# Patient Record
Sex: FEMALE | Race: WHITE | NOT HISPANIC OR LATINO | Employment: OTHER | RURAL
[De-identification: names, ages, dates, MRNs, and addresses within clinical notes are randomized per-mention and may not be internally consistent; named-entity substitution may affect disease eponyms.]

---

## 2017-04-07 ENCOUNTER — TELEPHONE (OUTPATIENT)
Dept: FAMILY MEDICINE CLINIC | Facility: CLINIC | Age: 72
End: 2017-04-07

## 2017-04-07 RX ORDER — ROSUVASTATIN CALCIUM 10 MG/1
10 TABLET, COATED ORAL DAILY
Qty: 90 TABLET | Refills: 1 | Status: SHIPPED | OUTPATIENT
Start: 2017-04-07 | End: 2017-07-24 | Stop reason: SDUPTHER

## 2017-07-24 ENCOUNTER — TELEPHONE (OUTPATIENT)
Dept: FAMILY MEDICINE CLINIC | Facility: CLINIC | Age: 72
End: 2017-07-24

## 2017-07-24 DIAGNOSIS — E78.5 HYPERLIPIDEMIA, UNSPECIFIED HYPERLIPIDEMIA TYPE: Primary | ICD-10-CM

## 2017-07-24 RX ORDER — ROSUVASTATIN CALCIUM 10 MG/1
10 TABLET, COATED ORAL DAILY
Qty: 90 TABLET | Refills: 1 | Status: SHIPPED | OUTPATIENT
Start: 2017-07-24 | End: 2018-02-15 | Stop reason: SDUPTHER

## 2017-07-24 NOTE — TELEPHONE ENCOUNTER
She wants to know if she can come in to get her Lipids checked. Says she is trying a new product and wants to  see if it is working? Also needs her Crestor refilled.

## 2017-07-25 ENCOUNTER — TELEPHONE (OUTPATIENT)
Dept: FAMILY MEDICINE CLINIC | Facility: CLINIC | Age: 72
End: 2017-07-25

## 2017-07-25 ENCOUNTER — LAB (OUTPATIENT)
Dept: FAMILY MEDICINE CLINIC | Facility: CLINIC | Age: 72
End: 2017-07-25

## 2017-07-25 DIAGNOSIS — E78.5 HYPERLIPIDEMIA, UNSPECIFIED HYPERLIPIDEMIA TYPE: ICD-10-CM

## 2017-07-25 LAB
ALBUMIN SERPL-MCNC: 4.1 G/DL (ref 3.5–5)
ALBUMIN/GLOB SERPL: 1.6 G/DL (ref 1.1–2.5)
ALP SERPL-CCNC: 72 U/L (ref 24–120)
ALT SERPL-CCNC: 46 U/L (ref 0–54)
AST SERPL-CCNC: 32 U/L (ref 7–45)
BASOPHILS # BLD AUTO: 0.01 10*3/MM3 (ref 0–0.2)
BASOPHILS NFR BLD AUTO: 0.2 % (ref 0–2)
BILIRUB SERPL-MCNC: 0.7 MG/DL (ref 0.1–1)
BUN SERPL-MCNC: 14 MG/DL (ref 5–21)
BUN/CREAT SERPL: 21.2 (ref 7–25)
CALCIUM SERPL-MCNC: 9 MG/DL (ref 8.4–10.4)
CHLORIDE SERPL-SCNC: 102 MMOL/L (ref 98–110)
CHOLEST SERPL-MCNC: 253 MG/DL (ref 130–200)
CO2 SERPL-SCNC: 29 MMOL/L (ref 24–31)
CREAT SERPL-MCNC: 0.66 MG/DL (ref 0.5–1.4)
EOSINOPHIL # BLD AUTO: 0.06 10*3/MM3 (ref 0–0.7)
EOSINOPHIL NFR BLD AUTO: 1.1 % (ref 0–4)
ERYTHROCYTE [DISTWIDTH] IN BLOOD BY AUTOMATED COUNT: 13.1 % (ref 12–15)
GLOBULIN SER CALC-MCNC: 2.5 GM/DL
GLUCOSE SERPL-MCNC: 93 MG/DL (ref 70–100)
HCT VFR BLD AUTO: 42.9 % (ref 37–47)
HDLC SERPL-MCNC: 78 MG/DL
HGB BLD-MCNC: 14 G/DL (ref 12–16)
IMM GRANULOCYTES # BLD: 0.02 10*3/MM3 (ref 0–0.03)
IMM GRANULOCYTES NFR BLD: 0.4 % (ref 0–5)
LDLC SERPL CALC-MCNC: 155 MG/DL (ref 0–99)
LYMPHOCYTES # BLD AUTO: 1.49 10*3/MM3 (ref 0.72–4.86)
LYMPHOCYTES NFR BLD AUTO: 26.1 % (ref 15–45)
MCH RBC QN AUTO: 31.7 PG (ref 28–32)
MCHC RBC AUTO-ENTMCNC: 32.6 G/DL (ref 33–36)
MCV RBC AUTO: 97.3 FL (ref 82–98)
MONOCYTES # BLD AUTO: 0.34 10*3/MM3 (ref 0.19–1.3)
MONOCYTES NFR BLD AUTO: 6 % (ref 4–12)
NEUTROPHILS # BLD AUTO: 3.78 10*3/MM3 (ref 1.87–8.4)
NEUTROPHILS NFR BLD AUTO: 66.2 % (ref 39–78)
PLATELET # BLD AUTO: 225 10*3/MM3 (ref 130–400)
POTASSIUM SERPL-SCNC: 4.3 MMOL/L (ref 3.5–5.3)
PROT SERPL-MCNC: 6.6 G/DL (ref 6.3–8.7)
RBC # BLD AUTO: 4.41 10*6/MM3 (ref 4.2–5.4)
SODIUM SERPL-SCNC: 140 MMOL/L (ref 135–145)
TRIGL SERPL-MCNC: 100 MG/DL (ref 0–149)
VLDLC SERPL CALC-MCNC: 20 MG/DL
WBC # BLD AUTO: 5.7 10*3/MM3 (ref 4.8–10.8)

## 2017-07-25 NOTE — TELEPHONE ENCOUNTER
----- Message from Kyleigh Wan MA sent at 7/25/2017  2:47 PM CDT -----  Pt said that she has not been on crestor for 3 months she states that a person is not supposed to be on a statin drug for long period time it will damage your heart she will talk to her  and see what he thinks about it.      Actually the studies in the [place] show a clear benefit from staying on a statin drug--that's the only thing shown to be helpful for prevention--I am happy to refer her to a lipid specialist if she would like more education on the subject

## 2017-07-27 ENCOUNTER — OFFICE VISIT (OUTPATIENT)
Dept: FAMILY MEDICINE CLINIC | Facility: CLINIC | Age: 72
End: 2017-07-27

## 2017-07-27 DIAGNOSIS — R00.2 PALPITATIONS: Primary | ICD-10-CM

## 2017-07-27 DIAGNOSIS — E78.2 MIXED HYPERLIPIDEMIA: ICD-10-CM

## 2017-07-27 DIAGNOSIS — IMO0001 COLD: ICD-10-CM

## 2017-07-27 LAB
T4 FREE SERPL-MCNC: 1.03 NG/DL (ref 0.78–2.19)
TSH SERPL DL<=0.005 MIU/L-ACNC: 2.76 MIU/ML (ref 0.47–4.68)

## 2017-07-27 PROCEDURE — 99213 OFFICE O/P EST LOW 20 MIN: CPT | Performed by: FAMILY MEDICINE

## 2017-07-27 NOTE — PROGRESS NOTES
Subjective   Lavinia Aguilar is a 72 y.o. female.     No chief complaint on file.      History of Present Illness     she notes issues with palpitations aloing wiht heat and cold initoleances --she is worried about her thyroid function      Current Outpatient Prescriptions:   •  estrogens, conjugated, (PREMARIN) 0.625 MG tablet, Take 0.625 mg by mouth Daily. Take daily for 21 days then do not take for 7 days., Disp: , Rfl:   •  estrogens, conjugated, (PREMARIN) 0.625 MG tablet, Take 1 tablet by mouth Daily. Take daily, Disp: 90 tablet, Rfl: 1  •  hydrochlorothiazide (HYDRODIURIL) 25 MG tablet, Take 25 mg by mouth Daily., Disp: , Rfl:   •  rosuvastatin (CRESTOR) 10 MG tablet, Take 1 tablet by mouth Daily., Disp: 90 tablet, Rfl: 1  No Known Allergies    Past Medical History:   Diagnosis Date   • Hyperlipidemia      Past Surgical History:   Procedure Laterality Date   • COLONOSCOPY     • HYSTERECTOMY     • INSERTION / REMOVAL CRANIAL DBS GENERATOR     • SHOULDER SURGERY Right    • TONSILLECTOMY     • TUBAL ABDOMINAL LIGATION         Review of Systems   Constitutional: Negative.    HENT: Negative.    Eyes: Negative.    Respiratory: Negative.    Cardiovascular: Negative.    Gastrointestinal: Negative.    Endocrine: Positive for cold intolerance.   Genitourinary: Negative.    Musculoskeletal: Negative.    Skin: Negative.    Allergic/Immunologic: Negative.    Neurological: Negative.    Hematological: Negative.    Psychiatric/Behavioral: Negative.        Objective  There were no vitals taken for this visit.  Physical Exam   Constitutional: She is oriented to person, place, and time. She appears well-developed and well-nourished.   HENT:   Head: Normocephalic and atraumatic.   Right Ear: External ear normal.   Left Ear: External ear normal.   Nose: Nose normal.   Mouth/Throat: Oropharynx is clear and moist.   Eyes: Conjunctivae and EOM are normal. Pupils are equal, round, and reactive to light.   Neck: Normal range of motion.  Neck supple.   Cardiovascular: Normal rate, regular rhythm, normal heart sounds and intact distal pulses.    Pulmonary/Chest: Effort normal and breath sounds normal.   Abdominal: Soft. Bowel sounds are normal.   Musculoskeletal: Normal range of motion.   Neurological: She is alert and oriented to person, place, and time. She has normal reflexes.   Skin: Skin is warm and dry.   Psychiatric: She has a normal mood and affect. Her behavior is normal. Judgment and thought content normal.   Nursing note and vitals reviewed.      Assessment/Plan   Diagnoses and all orders for this visit:    Palpitations  -     TSH  -     T4, free  -     Holter monitor - 24 hour  -     Adult Transthoracic Echo Complete    Cold    Mixed hyperlipidemia                 Orders Placed This Encounter   Procedures   • TSH   • T4, free   • Holter monitor - 24 hour     Order Specific Question:   Reason for exam?     Answer:   Palpitations   • Adult Transthoracic Echo Complete     Order Specific Question:   Reason for exam?     Answer:   Palpitations       Follow up: 4 week(s)

## 2017-08-08 ENCOUNTER — HOSPITAL ENCOUNTER (OUTPATIENT)
Dept: CARDIOLOGY | Facility: HOSPITAL | Age: 72
Discharge: HOME | End: 2017-08-08
Attending: FAMILY MEDICINE

## 2017-08-08 ENCOUNTER — HOSPITAL ENCOUNTER (OUTPATIENT)
Dept: CARDIOLOGY | Facility: HOSPITAL | Age: 72
Discharge: HOME | End: 2017-08-08
Attending: FAMILY MEDICINE | Admitting: FAMILY MEDICINE

## 2017-08-08 VITALS
DIASTOLIC BLOOD PRESSURE: 72 MMHG | SYSTOLIC BLOOD PRESSURE: 117 MMHG | BODY MASS INDEX: 21.26 KG/M2 | WEIGHT: 120 LBS | HEIGHT: 63 IN

## 2017-08-08 LAB
BH CV ECHO MEAS - AO MAX PG (FULL): 3.1 MMHG
BH CV ECHO MEAS - AO MAX PG: 6.3 MMHG
BH CV ECHO MEAS - AO MEAN PG (FULL): 1 MMHG
BH CV ECHO MEAS - AO MEAN PG: 3 MMHG
BH CV ECHO MEAS - AO ROOT AREA (BSA CORRECTED): 1.9
BH CV ECHO MEAS - AO ROOT AREA: 7.1 CM^2
BH CV ECHO MEAS - AO ROOT DIAM: 3 CM
BH CV ECHO MEAS - AO V2 MAX: 125 CM/SEC
BH CV ECHO MEAS - AO V2 MEAN: 83 CM/SEC
BH CV ECHO MEAS - AO V2 VTI: 28.8 CM
BH CV ECHO MEAS - AVA(I,A): 2.1 CM^2
BH CV ECHO MEAS - AVA(I,D): 2.1 CM^2
BH CV ECHO MEAS - AVA(V,A): 2 CM^2
BH CV ECHO MEAS - AVA(V,D): 2 CM^2
BH CV ECHO MEAS - BSA(HAYCOCK): 1.6 M^2
BH CV ECHO MEAS - BSA: 1.6 M^2
BH CV ECHO MEAS - BZI_BMI: 21.3 KILOGRAMS/M^2
BH CV ECHO MEAS - BZI_METRIC_HEIGHT: 160 CM
BH CV ECHO MEAS - BZI_METRIC_WEIGHT: 54.4 KG
BH CV ECHO MEAS - CONTRAST EF 4CH: 65.2 ML/M^2
BH CV ECHO MEAS - EDV(CUBED): 65.9 ML
BH CV ECHO MEAS - EDV(MOD-SP4): 66 ML
BH CV ECHO MEAS - EDV(TEICH): 71.7 ML
BH CV ECHO MEAS - EF(CUBED): 68.1 %
BH CV ECHO MEAS - EF(MOD-SP4): 65.2 %
BH CV ECHO MEAS - EF(TEICH): 60.2 %
BH CV ECHO MEAS - ESV(CUBED): 21 ML
BH CV ECHO MEAS - ESV(MOD-SP4): 23 ML
BH CV ECHO MEAS - ESV(TEICH): 28.5 ML
BH CV ECHO MEAS - FS: 31.7 %
BH CV ECHO MEAS - IVS/LVPW: 1.1
BH CV ECHO MEAS - IVSD: 1 CM
BH CV ECHO MEAS - LA DIMENSION: 3.4 CM
BH CV ECHO MEAS - LA/AO: 1.1
BH CV ECHO MEAS - LAT PEAK E' VEL: 7.6 CM/SEC
BH CV ECHO MEAS - LV DIASTOLIC VOL/BSA (35-75): 42.4 ML/M^2
BH CV ECHO MEAS - LV MASS(C)D: 121.7 GRAMS
BH CV ECHO MEAS - LV MASS(C)DI: 78.2 GRAMS/M^2
BH CV ECHO MEAS - LV MAX PG: 3.2 MMHG
BH CV ECHO MEAS - LV MEAN PG: 2 MMHG
BH CV ECHO MEAS - LV SYSTOLIC VOL/BSA (12-30): 14.8 ML/M^2
BH CV ECHO MEAS - LV V1 MAX: 89.4 CM/SEC
BH CV ECHO MEAS - LV V1 MEAN: 59 CM/SEC
BH CV ECHO MEAS - LV V1 VTI: 21.3 CM
BH CV ECHO MEAS - LVIDD: 4 CM
BH CV ECHO MEAS - LVIDS: 2.8 CM
BH CV ECHO MEAS - LVLD AP4: 7.4 CM
BH CV ECHO MEAS - LVLS AP4: 5.7 CM
BH CV ECHO MEAS - LVOT AREA (M): 2.8 CM^2
BH CV ECHO MEAS - LVOT AREA: 2.8 CM^2
BH CV ECHO MEAS - LVOT DIAM: 1.9 CM
BH CV ECHO MEAS - LVPWD: 0.92 CM
BH CV ECHO MEAS - MED PEAK E' VEL: 7.72 CM/SEC
BH CV ECHO MEAS - MV A MAX VEL: 80.5 CM/SEC
BH CV ECHO MEAS - MV DEC TIME: 0.17 SEC
BH CV ECHO MEAS - MV E MAX VEL: 72.8 CM/SEC
BH CV ECHO MEAS - MV E/A: 0.9
BH CV ECHO MEAS - RAP SYSTOLE: 10 MMHG
BH CV ECHO MEAS - RVSP: 26.2 MMHG
BH CV ECHO MEAS - SI(AO): 130.8 ML/M^2
BH CV ECHO MEAS - SI(CUBED): 28.9 ML/M^2
BH CV ECHO MEAS - SI(LVOT): 38.8 ML/M^2
BH CV ECHO MEAS - SI(MOD-SP4): 27.6 ML/M^2
BH CV ECHO MEAS - SI(TEICH): 27.7 ML/M^2
BH CV ECHO MEAS - SV(AO): 203.6 ML
BH CV ECHO MEAS - SV(CUBED): 44.9 ML
BH CV ECHO MEAS - SV(LVOT): 60.4 ML
BH CV ECHO MEAS - SV(MOD-SP4): 43 ML
BH CV ECHO MEAS - SV(TEICH): 43.2 ML
BH CV ECHO MEAS - TR MAX VEL: 201 CM/SEC
LEFT ATRIUM VOLUME INDEX: 19.4 ML/M2
LEFT ATRIUM VOLUME: 30.2 CM3
LV EF 2D ECHO EST: 65 %

## 2017-08-08 PROCEDURE — 93226 XTRNL ECG REC<48 HR SCAN A/R: CPT

## 2017-08-08 PROCEDURE — 93225 XTRNL ECG REC<48 HRS REC: CPT

## 2017-08-08 PROCEDURE — 93306 TTE W/DOPPLER COMPLETE: CPT | Performed by: INTERNAL MEDICINE

## 2017-08-08 PROCEDURE — 93306 TTE W/DOPPLER COMPLETE: CPT

## 2017-08-20 PROCEDURE — 93227 XTRNL ECG REC<48 HR R&I: CPT | Performed by: INTERNAL MEDICINE

## 2017-08-25 ENCOUNTER — OFFICE VISIT (OUTPATIENT)
Dept: FAMILY MEDICINE CLINIC | Facility: CLINIC | Age: 72
End: 2017-08-25

## 2017-08-25 VITALS
WEIGHT: 116 LBS | RESPIRATION RATE: 16 BRPM | HEIGHT: 63 IN | HEART RATE: 97 BPM | DIASTOLIC BLOOD PRESSURE: 80 MMHG | SYSTOLIC BLOOD PRESSURE: 118 MMHG | OXYGEN SATURATION: 98 % | BODY MASS INDEX: 20.55 KG/M2

## 2017-08-25 DIAGNOSIS — R00.2 PALPITATIONS: ICD-10-CM

## 2017-08-25 DIAGNOSIS — E78.2 MIXED HYPERLIPIDEMIA: Primary | ICD-10-CM

## 2017-08-25 PROCEDURE — 99213 OFFICE O/P EST LOW 20 MIN: CPT | Performed by: FAMILY MEDICINE

## 2017-08-25 RX ORDER — CHOLECALCIFEROL (VITAMIN D3) 125 MCG
100 CAPSULE ORAL
COMMUNITY
Start: 2014-04-15 | End: 2020-12-03

## 2017-08-25 RX ORDER — ROSUVASTATIN CALCIUM 10 MG/1
10 TABLET, COATED ORAL
COMMUNITY
Start: 2014-04-15 | End: 2017-08-25

## 2017-08-25 NOTE — PROGRESS NOTES
"Subjective   Lavinia Aguilar is a 72 y.o. female.     Chief Complaint   Patient presents with   • Follow-up     4 wk        History of Present Illness     lavinia is here for recheck--her palpitiatons are improved..she is tolerating crestor witout myaglais      Current Outpatient Prescriptions:   •  Coenzyme Q-10 100 MG capsule, Take 100 mg by mouth., Disp: , Rfl:   •  Multiple Vitamins-Minerals (MULTIVITAMIN ADULT PO), Take  by mouth., Disp: , Rfl:   •  estrogens, conjugated, (PREMARIN) 0.625 MG tablet, Take 1 tablet by mouth Daily. Take daily, Disp: 90 tablet, Rfl: 1  •  rosuvastatin (CRESTOR) 10 MG tablet, Take 1 tablet by mouth Daily., Disp: 90 tablet, Rfl: 1  No Known Allergies    Past Medical History:   Diagnosis Date   • Hyperlipidemia      Past Surgical History:   Procedure Laterality Date   • COLONOSCOPY     • HYSTERECTOMY     • INSERTION / REMOVAL CRANIAL DBS GENERATOR     • SHOULDER SURGERY Right    • TONSILLECTOMY     • TUBAL ABDOMINAL LIGATION         Review of Systems   Constitutional: Negative.    HENT: Negative.    Eyes: Negative.    Respiratory: Negative.    Cardiovascular: Positive for palpitations (but improved).   Gastrointestinal: Negative.    Endocrine: Negative.    Genitourinary: Negative.    Musculoskeletal: Negative.    Skin: Negative.    Allergic/Immunologic: Negative.    Neurological: Negative.    Hematological: Negative.    Psychiatric/Behavioral: Negative.        Objective  /80  Pulse 97  Resp 16  Ht 63\" (160 cm)  Wt 116 lb (52.6 kg)  SpO2 98%  BMI 20.55 kg/m2  Physical Exam   Constitutional: She is oriented to person, place, and time. She appears well-developed and well-nourished.   HENT:   Head: Normocephalic and atraumatic.   Right Ear: External ear normal.   Left Ear: External ear normal.   Nose: Nose normal.   Mouth/Throat: Oropharynx is clear and moist.   Eyes: Conjunctivae and EOM are normal. Pupils are equal, round, and reactive to light.   Neck: Normal range of motion. " Neck supple.   Cardiovascular: Normal rate, regular rhythm, normal heart sounds and intact distal pulses.    Pulmonary/Chest: Effort normal and breath sounds normal.   Abdominal: Soft. Bowel sounds are normal.   Musculoskeletal: Normal range of motion.   Neurological: She is alert and oriented to person, place, and time. She has normal reflexes.   Skin: Skin is warm and dry.   Psychiatric: She has a normal mood and affect. Her behavior is normal. Judgment and thought content normal.   Nursing note and vitals reviewed.      Assessment/Plan   Lavinia was seen today for follow-up.    Diagnoses and all orders for this visit:    Mixed hyperlipidemia    Palpitations    she revealed she has been under a lot of stress at her [employer]--she is considering leaving her [employer]             No orders of the defined types were placed in this encounter.      Follow up: 6 month(s)

## 2017-12-07 DIAGNOSIS — E78.5 HYPERLIPIDEMIA, UNSPECIFIED HYPERLIPIDEMIA TYPE: Primary | ICD-10-CM

## 2017-12-08 ENCOUNTER — RESULTS ENCOUNTER (OUTPATIENT)
Dept: FAMILY MEDICINE CLINIC | Facility: CLINIC | Age: 72
End: 2017-12-08

## 2017-12-08 DIAGNOSIS — E78.5 HYPERLIPIDEMIA, UNSPECIFIED HYPERLIPIDEMIA TYPE: ICD-10-CM

## 2017-12-08 LAB
ALBUMIN SERPL-MCNC: 4.1 G/DL (ref 3.5–5)
ALBUMIN/GLOB SERPL: 1.5 G/DL (ref 1.1–2.5)
ALP SERPL-CCNC: 62 U/L (ref 24–120)
ALT SERPL-CCNC: 39 U/L (ref 0–54)
AST SERPL-CCNC: 30 U/L (ref 7–45)
BILIRUB SERPL-MCNC: 0.4 MG/DL (ref 0.1–1)
BUN SERPL-MCNC: 14 MG/DL (ref 5–21)
BUN/CREAT SERPL: 19.2 (ref 7–25)
CALCIUM SERPL-MCNC: 9.3 MG/DL (ref 8.4–10.4)
CHLORIDE SERPL-SCNC: 100 MMOL/L (ref 98–110)
CHOLEST SERPL-MCNC: 172 MG/DL (ref 130–200)
CO2 SERPL-SCNC: 32 MMOL/L (ref 24–31)
CREAT SERPL-MCNC: 0.73 MG/DL (ref 0.5–1.4)
GFR SERPLBLD CREATININE-BSD FMLA CKD-EPI: 78 ML/MIN/1.73
GFR SERPLBLD CREATININE-BSD FMLA CKD-EPI: 95 ML/MIN/1.73
GLOBULIN SER CALC-MCNC: 2.8 GM/DL
GLUCOSE SERPL-MCNC: 92 MG/DL (ref 70–100)
HDLC SERPL-MCNC: 91 MG/DL
LDLC SERPL CALC-MCNC: 67 MG/DL (ref 0–99)
POTASSIUM SERPL-SCNC: 4.1 MMOL/L (ref 3.5–5.3)
PROT SERPL-MCNC: 6.9 G/DL (ref 6.3–8.7)
SODIUM SERPL-SCNC: 140 MMOL/L (ref 135–145)
TRIGL SERPL-MCNC: 69 MG/DL (ref 0–149)
VLDLC SERPL CALC-MCNC: 13.8 MG/DL

## 2018-01-25 ENCOUNTER — HOSPITAL ENCOUNTER (OUTPATIENT)
Dept: GENERAL RADIOLOGY | Age: 73
Discharge: HOME | End: 2018-01-25
Payer: MEDICARE

## 2018-01-25 ENCOUNTER — HOSPITAL ENCOUNTER (OUTPATIENT)
Dept: CT IMAGING | Age: 73
Discharge: HOME | End: 2018-01-25
Payer: MEDICARE

## 2018-01-25 DIAGNOSIS — R52 PAIN: ICD-10-CM

## 2018-01-25 DIAGNOSIS — M75.122 COMPLETE ROTATOR CUFF RUPTURE OF LEFT SHOULDER: ICD-10-CM

## 2018-01-25 PROCEDURE — 73040 CONTRAST X-RAY OF SHOULDER: CPT

## 2018-01-25 PROCEDURE — 23350 INJECTION FOR SHOULDER X-RAY: CPT

## 2018-01-25 PROCEDURE — 6360000004 HC RX CONTRAST MEDICATION: Performed by: ORTHOPAEDIC SURGERY

## 2018-01-25 PROCEDURE — 73200 CT UPPER EXTREMITY W/O DYE: CPT

## 2018-01-25 RX ADMIN — IOPAMIDOL 15 ML: 612 INJECTION, SOLUTION INTRATHECAL at 11:00

## 2018-02-15 ENCOUNTER — HOSPITAL ENCOUNTER (OUTPATIENT)
Dept: PREADMISSION TESTING | Age: 73
Discharge: HOME | DRG: 483 | End: 2018-02-19
Payer: MEDICARE

## 2018-02-15 ENCOUNTER — HOSPITAL ENCOUNTER (OUTPATIENT)
Dept: GENERAL RADIOLOGY | Age: 73
Discharge: HOME | DRG: 483 | End: 2018-02-15
Payer: MEDICARE

## 2018-02-15 VITALS — HEIGHT: 63 IN | BODY MASS INDEX: 20.55 KG/M2 | WEIGHT: 116 LBS

## 2018-02-15 LAB
ALBUMIN SERPL-MCNC: 3.8 G/DL (ref 3.5–5.2)
ALP BLD-CCNC: 60 U/L (ref 35–104)
ALT SERPL-CCNC: 14 U/L (ref 5–33)
ANION GAP SERPL CALCULATED.3IONS-SCNC: 10 MMOL/L (ref 7–19)
AST SERPL-CCNC: 22 U/L (ref 5–32)
BASOPHILS ABSOLUTE: 0 K/UL (ref 0–0.2)
BASOPHILS RELATIVE PERCENT: 0.3 % (ref 0–1)
BILIRUB SERPL-MCNC: <0.2 MG/DL (ref 0.2–1.2)
BUN BLDV-MCNC: 15 MG/DL (ref 8–23)
CALCIUM SERPL-MCNC: 9.4 MG/DL (ref 8.8–10.2)
CHLORIDE BLD-SCNC: 103 MMOL/L (ref 98–111)
CO2: 30 MMOL/L (ref 22–29)
CREAT SERPL-MCNC: 0.7 MG/DL (ref 0.5–0.9)
EOSINOPHILS ABSOLUTE: 0.2 K/UL (ref 0–0.6)
EOSINOPHILS RELATIVE PERCENT: 2.2 % (ref 0–5)
GFR NON-AFRICAN AMERICAN: >60
GLUCOSE BLD-MCNC: 80 MG/DL (ref 74–109)
HCT VFR BLD CALC: 43.4 % (ref 37–47)
HEMOGLOBIN: 14.3 G/DL (ref 12–16)
INR BLD: 0.95 (ref 0.88–1.18)
LYMPHOCYTES ABSOLUTE: 2.1 K/UL (ref 1.1–4.5)
LYMPHOCYTES RELATIVE PERCENT: 28.7 % (ref 20–40)
MCH RBC QN AUTO: 32.1 PG (ref 27–31)
MCHC RBC AUTO-ENTMCNC: 32.9 G/DL (ref 33–37)
MCV RBC AUTO: 97.5 FL (ref 81–99)
MONOCYTES ABSOLUTE: 0.5 K/UL (ref 0–0.9)
MONOCYTES RELATIVE PERCENT: 6.2 % (ref 0–10)
NEUTROPHILS ABSOLUTE: 4.6 K/UL (ref 1.5–7.5)
NEUTROPHILS RELATIVE PERCENT: 62.3 % (ref 50–65)
PDW BLD-RTO: 12.5 % (ref 11.5–14.5)
PLATELET # BLD: 221 K/UL (ref 130–400)
PMV BLD AUTO: 10.3 FL (ref 9.4–12.3)
POTASSIUM SERPL-SCNC: 4.3 MMOL/L (ref 3.5–5)
PROTHROMBIN TIME: 12.6 SEC (ref 12–14.6)
RBC # BLD: 4.45 M/UL (ref 4.2–5.4)
SODIUM BLD-SCNC: 143 MMOL/L (ref 136–145)
TOTAL PROTEIN: 6.7 G/DL (ref 6.6–8.7)
WBC # BLD: 7.4 K/UL (ref 4.8–10.8)

## 2018-02-15 PROCEDURE — 71046 X-RAY EXAM CHEST 2 VIEWS: CPT

## 2018-02-15 PROCEDURE — 80053 COMPREHEN METABOLIC PANEL: CPT

## 2018-02-15 PROCEDURE — 85610 PROTHROMBIN TIME: CPT

## 2018-02-15 PROCEDURE — 85025 COMPLETE CBC W/AUTO DIFF WBC: CPT

## 2018-02-15 PROCEDURE — 87070 CULTURE OTHR SPECIMN AEROBIC: CPT

## 2018-02-15 PROCEDURE — 93005 ELECTROCARDIOGRAM TRACING: CPT

## 2018-02-15 RX ORDER — ASCORBIC ACID 1000 MG
TABLET ORAL DAILY
COMMUNITY

## 2018-02-15 RX ORDER — ROSUVASTATIN CALCIUM 10 MG/1
10 TABLET, COATED ORAL DAILY
Qty: 90 TABLET | Refills: 1 | Status: SHIPPED | OUTPATIENT
Start: 2018-02-15 | End: 2018-06-05 | Stop reason: SDUPTHER

## 2018-02-15 RX ORDER — ROSUVASTATIN CALCIUM 10 MG/1
10 TABLET, COATED ORAL DAILY
COMMUNITY

## 2018-02-16 LAB — MRSA CULTURE ONLY: NORMAL

## 2018-02-21 PROBLEM — M75.102 LEFT ROTATOR CUFF TEAR ARTHROPATHY: Status: ACTIVE | Noted: 2018-02-21

## 2018-02-21 PROBLEM — M12.812 LEFT ROTATOR CUFF TEAR ARTHROPATHY: Status: ACTIVE | Noted: 2018-02-21

## 2018-02-21 NOTE — OP NOTE
Patient Name: Trevor Nguyễn  MRN: [id number]  : 1945      DATE of SURGERY: 18    SURGEON: Rica Payan MD    ASSISTANT: NONE    PREOPERATIVE DIAGNOSIS: Left Shoulder Rotator Cuff Tear Arthropathy    POSTOPERATIVE DIAGNOSIS: Left Shoulder Rotator Cuff Tear Arthropathy    PROCEDURE PERFORMED: Left Reverse Total Shoulder Arthroplasty    IMPLANTS: [employer]                Baseplate:small                Glenosphere: 36 + 4                Poly: + 3                Cup: 36 + 2 right                             Stem: 7 pressfit    ANESTHESIA USED: General endotrachial anesthesia, interscalene block    OPERATIVE INDICATIONS: 67 y.o. YO female with progressive loss of function and increasing pain of the upper extremity due to a massive irreparable tear of the rotator cuff. Due to loss of function and progressive pain, a reverse shoulder arthroplasty is planned to improve function and decrease pain. Surgical evaluation was discussed and the patient wished to proceed understanding risks, benefits, and alternatives. The surgical indications were to relieve pain, improve function, and prevent future disability in regards to the shoulder pathology dictated in the aboved diagnoses. Risks included, but were not limited to, that of anesthesia, bleeding, infection, pain, damage to local structures, postoperative dislocation, need for further surgery, failure of repair, stiffness, failure of implants, and loss of function. The patient has failed a combination of the following improve pain and function: physical therapy >12 wks, corticosteroid injections, NSAIDs, activity modification. ESTIMATED BLOOD LOSS: 150 mL    DRAINS: none     COMPLICATIONS: none    SPECIMENS: none    FINDINGS: see op note    PROCEDURE in DETAIL:  The patient was seen in the preoperative holding room, once again the informed consent was reviewed with the patient and signed.   The site of surgery was marked with the patient's complication. Attention was turned back to the humeral side where progressively sized broaches were inserted until a stable fit was achieved. A trial cup and polyethylenes were placed until range of motion and stability were adequate. The conjoint tendon showed increased tension. With traction of the shoulder, the entire scapula was translating without dissociation of the polyethylene. Trial implants were removed, final implants impacted, and the shoulder was once again reduced showing excellent stability and range of motion. The incision was thoroughly irrigated, followed by closure in layers. The skin was closed with adhesive glue. A sterile dressing and sling were placed. Counts were correct. The patient was awakened by anesthesia, transported to the recovery room in stable condition.     POSTOPERATIVE PLAN:  1) Admit inpatient for pain control, neurovascular monitoring  2) Discharge home once pain is controlled  3) Reverse total shoulder protocol     Electronically signed by Herrera Subramanian MD on 2/22/2018 at 2:18 PM

## 2018-02-22 ENCOUNTER — APPOINTMENT (OUTPATIENT)
Dept: GENERAL RADIOLOGY | Age: 73
DRG: 483 | End: 2018-02-22
Attending: ORTHOPAEDIC SURGERY
Payer: MEDICARE

## 2018-02-22 ENCOUNTER — ANESTHESIA EVENT (OUTPATIENT)
Dept: OPERATING ROOM | Age: 73
DRG: 483 | End: 2018-02-22
Payer: MEDICARE

## 2018-02-22 ENCOUNTER — ANESTHESIA (OUTPATIENT)
Dept: OPERATING ROOM | Age: 73
DRG: 483 | End: 2018-02-22
Payer: MEDICARE

## 2018-02-22 ENCOUNTER — HOSPITAL ENCOUNTER (INPATIENT)
Age: 73
LOS: 1 days | Discharge: HOME | DRG: 483 | End: 2018-02-23
Attending: ORTHOPAEDIC SURGERY | Admitting: ORTHOPAEDIC SURGERY
Payer: MEDICARE

## 2018-02-22 VITALS
OXYGEN SATURATION: 100 % | SYSTOLIC BLOOD PRESSURE: 107 MMHG | RESPIRATION RATE: 21 BRPM | DIASTOLIC BLOOD PRESSURE: 54 MMHG | TEMPERATURE: 97 F

## 2018-02-22 LAB
ABO/RH: NORMAL
ANTIBODY SCREEN: NORMAL

## 2018-02-22 PROCEDURE — 2500000003 HC RX 250 WO HCPCS: Performed by: NURSE ANESTHETIST, CERTIFIED REGISTERED

## 2018-02-22 PROCEDURE — 6360000002 HC RX W HCPCS: Performed by: ORTHOPAEDIC SURGERY

## 2018-02-22 PROCEDURE — 86901 BLOOD TYPING SEROLOGIC RH(D): CPT

## 2018-02-22 PROCEDURE — 2720000010 HC SURG SUPPLY STERILE: Performed by: ORTHOPAEDIC SURGERY

## 2018-02-22 PROCEDURE — 73030 X-RAY EXAM OF SHOULDER: CPT

## 2018-02-22 PROCEDURE — 7100000001 HC PACU RECOVERY - ADDTL 15 MIN: Performed by: ORTHOPAEDIC SURGERY

## 2018-02-22 PROCEDURE — 6360000002 HC RX W HCPCS: Performed by: NURSE ANESTHETIST, CERTIFIED REGISTERED

## 2018-02-22 PROCEDURE — 86900 BLOOD TYPING SEROLOGIC ABO: CPT

## 2018-02-22 PROCEDURE — 6360000002 HC RX W HCPCS

## 2018-02-22 PROCEDURE — 1210000000 HC MED SURG R&B

## 2018-02-22 PROCEDURE — 2580000003 HC RX 258: Performed by: ORTHOPAEDIC SURGERY

## 2018-02-22 PROCEDURE — 2720000001 HC MISC SURG SUPPLY STERILE $51-500: Performed by: ORTHOPAEDIC SURGERY

## 2018-02-22 PROCEDURE — C1776 JOINT DEVICE (IMPLANTABLE): HCPCS | Performed by: ORTHOPAEDIC SURGERY

## 2018-02-22 PROCEDURE — C1713 ANCHOR/SCREW BN/BN,TIS/BN: HCPCS | Performed by: ORTHOPAEDIC SURGERY

## 2018-02-22 PROCEDURE — 3700000001 HC ADD 15 MINUTES (ANESTHESIA): Performed by: ORTHOPAEDIC SURGERY

## 2018-02-22 PROCEDURE — 2720000000 HC MISC SURG SUPPLY STERILE $0-50: Performed by: ORTHOPAEDIC SURGERY

## 2018-02-22 PROCEDURE — 2700000000 HC OXYGEN THERAPY PER DAY

## 2018-02-22 PROCEDURE — 3600000005 HC SURGERY LEVEL 5 BASE: Performed by: ORTHOPAEDIC SURGERY

## 2018-02-22 PROCEDURE — 6370000000 HC RX 637 (ALT 250 FOR IP): Performed by: ORTHOPAEDIC SURGERY

## 2018-02-22 PROCEDURE — 36415 COLL VENOUS BLD VENIPUNCTURE: CPT

## 2018-02-22 PROCEDURE — 2710000010 HC SURG SUPPLY NONSTERILE: Performed by: ORTHOPAEDIC SURGERY

## 2018-02-22 PROCEDURE — 94664 DEMO&/EVAL PT USE INHALER: CPT

## 2018-02-22 PROCEDURE — 3700000000 HC ANESTHESIA ATTENDED CARE: Performed by: ORTHOPAEDIC SURGERY

## 2018-02-22 PROCEDURE — 3600000015 HC SURGERY LEVEL 5 ADDTL 15MIN: Performed by: ORTHOPAEDIC SURGERY

## 2018-02-22 PROCEDURE — 0RRK00Z REPLACEMENT OF LEFT SHOULDER JOINT WITH REVERSE BALL AND SOCKET SYNTHETIC SUBSTITUTE, OPEN APPROACH: ICD-10-PCS | Performed by: ORTHOPAEDIC SURGERY

## 2018-02-22 PROCEDURE — 64415 NJX AA&/STRD BRCH PLXS IMG: CPT | Performed by: NURSE ANESTHETIST, CERTIFIED REGISTERED

## 2018-02-22 PROCEDURE — 7100000000 HC PACU RECOVERY - FIRST 15 MIN: Performed by: ORTHOPAEDIC SURGERY

## 2018-02-22 PROCEDURE — 86850 RBC ANTIBODY SCREEN: CPT

## 2018-02-22 DEVICE — STEM HUM SZ 7 SHLDR TI UNIVERS REVERS: Type: IMPLANTABLE DEVICE | Site: SHOULDER | Status: FUNCTIONAL

## 2018-02-22 DEVICE — SCREW BNE L36MM DIA4.5MM UNIV SHLDR TI PERIPH LOK UNIVERSE: Type: IMPLANTABLE DEVICE | Site: SHOULDER | Status: FUNCTIONAL

## 2018-02-22 DEVICE — CUP HUM DIA36MM +2MM OFFSET R SHLDR SUT UNIVERS REVERS: Type: IMPLANTABLE DEVICE | Site: SHOULDER | Status: FUNCTIONAL

## 2018-02-22 DEVICE — SPHERE GLEN SM DIA36MM +4MM OFFSET LAT SHLDR UNIVERS REVERS: Type: IMPLANTABLE DEVICE | Site: SHOULDER | Status: FUNCTIONAL

## 2018-02-22 DEVICE — SCREW BNE L30MM DIA4.5MM UNIV SHLDR TI PERIPH LOK UNIVERSE: Type: IMPLANTABLE DEVICE | Site: SHOULDER | Status: FUNCTIONAL

## 2018-02-22 DEVICE — SCREW BNE L20MM DIA6.5MM UNIV SHLDR CTRL UNIVERSE REVERS: Type: IMPLANTABLE DEVICE | Site: SHOULDER | Status: FUNCTIONAL

## 2018-02-22 DEVICE — LINER HUM SM DIA36MM +3MM OFFSET SHLDR UHMWPE UNIVERS: Type: IMPLANTABLE DEVICE | Site: SHOULDER | Status: FUNCTIONAL

## 2018-02-22 DEVICE — BASEPLATE GLEN SM UNIV SHLDR CAP COAT UNIVERSE REVERS: Type: IMPLANTABLE DEVICE | Site: SHOULDER | Status: FUNCTIONAL

## 2018-02-22 RX ORDER — DEXAMETHASONE SODIUM PHOSPHATE 10 MG/ML
INJECTION INTRAMUSCULAR; INTRAVENOUS PRN
Status: DISCONTINUED | OUTPATIENT
Start: 2018-02-22 | End: 2018-02-22 | Stop reason: SDUPTHER

## 2018-02-22 RX ORDER — LABETALOL HYDROCHLORIDE 5 MG/ML
5 INJECTION, SOLUTION INTRAVENOUS EVERY 10 MIN PRN
Status: DISCONTINUED | OUTPATIENT
Start: 2018-02-22 | End: 2018-02-22 | Stop reason: HOSPADM

## 2018-02-22 RX ORDER — SCOLOPAMINE TRANSDERMAL SYSTEM 1 MG/1
1 PATCH, EXTENDED RELEASE TRANSDERMAL ONCE
Status: DISCONTINUED | OUTPATIENT
Start: 2018-02-22 | End: 2018-02-22 | Stop reason: HOSPADM

## 2018-02-22 RX ORDER — ACETAMINOPHEN 325 MG/1
650 TABLET ORAL EVERY 4 HOURS PRN
Status: DISCONTINUED | OUTPATIENT
Start: 2018-02-22 | End: 2018-02-23 | Stop reason: HOSPADM

## 2018-02-22 RX ORDER — PROMETHAZINE HYDROCHLORIDE 25 MG/ML
6.25 INJECTION, SOLUTION INTRAMUSCULAR; INTRAVENOUS
Status: DISCONTINUED | OUTPATIENT
Start: 2018-02-22 | End: 2018-02-22 | Stop reason: HOSPADM

## 2018-02-22 RX ORDER — FAMOTIDINE 20 MG/1
20 TABLET, FILM COATED ORAL 2 TIMES DAILY
Status: DISCONTINUED | OUTPATIENT
Start: 2018-02-22 | End: 2018-02-23 | Stop reason: HOSPADM

## 2018-02-22 RX ORDER — METOCLOPRAMIDE HYDROCHLORIDE 5 MG/ML
10 INJECTION INTRAMUSCULAR; INTRAVENOUS
Status: DISCONTINUED | OUTPATIENT
Start: 2018-02-22 | End: 2018-02-22 | Stop reason: HOSPADM

## 2018-02-22 RX ORDER — MIDAZOLAM HYDROCHLORIDE 1 MG/ML
2 INJECTION INTRAMUSCULAR; INTRAVENOUS
Status: DISCONTINUED | OUTPATIENT
Start: 2018-02-22 | End: 2018-02-22 | Stop reason: HOSPADM

## 2018-02-22 RX ORDER — ACETAMINOPHEN 650 MG/1
650 SUPPOSITORY RECTAL EVERY 4 HOURS PRN
Status: DISCONTINUED | OUTPATIENT
Start: 2018-02-22 | End: 2018-02-23 | Stop reason: HOSPADM

## 2018-02-22 RX ORDER — SODIUM CHLORIDE 0.9 % (FLUSH) 0.9 %
10 SYRINGE (ML) INJECTION EVERY 12 HOURS SCHEDULED
Status: DISCONTINUED | OUTPATIENT
Start: 2018-02-22 | End: 2018-02-22 | Stop reason: HOSPADM

## 2018-02-22 RX ORDER — ENALAPRILAT 2.5 MG/2ML
1.25 INJECTION INTRAVENOUS
Status: DISCONTINUED | OUTPATIENT
Start: 2018-02-22 | End: 2018-02-22 | Stop reason: HOSPADM

## 2018-02-22 RX ORDER — FENTANYL CITRATE 50 UG/ML
50 INJECTION, SOLUTION INTRAMUSCULAR; INTRAVENOUS
Status: DISCONTINUED | OUTPATIENT
Start: 2018-02-22 | End: 2018-02-22 | Stop reason: HOSPADM

## 2018-02-22 RX ORDER — MORPHINE SULFATE 4 MG/ML
2 INJECTION, SOLUTION INTRAMUSCULAR; INTRAVENOUS EVERY 5 MIN PRN
Status: DISCONTINUED | OUTPATIENT
Start: 2018-02-22 | End: 2018-02-22 | Stop reason: HOSPADM

## 2018-02-22 RX ORDER — SODIUM CHLORIDE, SODIUM LACTATE, POTASSIUM CHLORIDE, CALCIUM CHLORIDE 600; 310; 30; 20 MG/100ML; MG/100ML; MG/100ML; MG/100ML
INJECTION, SOLUTION INTRAVENOUS CONTINUOUS
Status: DISCONTINUED | OUTPATIENT
Start: 2018-02-22 | End: 2018-02-22

## 2018-02-22 RX ORDER — DIPHENHYDRAMINE HYDROCHLORIDE 50 MG/ML
12.5 INJECTION INTRAMUSCULAR; INTRAVENOUS
Status: DISCONTINUED | OUTPATIENT
Start: 2018-02-22 | End: 2018-02-22 | Stop reason: HOSPADM

## 2018-02-22 RX ORDER — MORPHINE SULFATE 4 MG/ML
2 INJECTION, SOLUTION INTRAMUSCULAR; INTRAVENOUS
Status: DISCONTINUED | OUTPATIENT
Start: 2018-02-22 | End: 2018-02-23 | Stop reason: HOSPADM

## 2018-02-22 RX ORDER — SODIUM CHLORIDE 0.9 % (FLUSH) 0.9 %
10 SYRINGE (ML) INJECTION PRN
Status: DISCONTINUED | OUTPATIENT
Start: 2018-02-22 | End: 2018-02-22 | Stop reason: HOSPADM

## 2018-02-22 RX ORDER — MULTIVITAMIN WITH FOLIC ACID 400 MCG
1 TABLET ORAL DAILY
Status: DISCONTINUED | OUTPATIENT
Start: 2018-02-22 | End: 2018-02-23 | Stop reason: HOSPADM

## 2018-02-22 RX ORDER — FENTANYL CITRATE 50 UG/ML
INJECTION, SOLUTION INTRAMUSCULAR; INTRAVENOUS PRN
Status: DISCONTINUED | OUTPATIENT
Start: 2018-02-22 | End: 2018-02-22 | Stop reason: SDUPTHER

## 2018-02-22 RX ORDER — PROPOFOL 10 MG/ML
INJECTION, EMULSION INTRAVENOUS PRN
Status: DISCONTINUED | OUTPATIENT
Start: 2018-02-22 | End: 2018-02-22 | Stop reason: SDUPTHER

## 2018-02-22 RX ORDER — EPHEDRINE SULFATE 50 MG/ML
INJECTION, SOLUTION INTRAVENOUS PRN
Status: DISCONTINUED | OUTPATIENT
Start: 2018-02-22 | End: 2018-02-22 | Stop reason: SDUPTHER

## 2018-02-22 RX ORDER — MEPERIDINE HYDROCHLORIDE 50 MG/ML
12.5 INJECTION INTRAMUSCULAR; INTRAVENOUS; SUBCUTANEOUS EVERY 5 MIN PRN
Status: DISCONTINUED | OUTPATIENT
Start: 2018-02-22 | End: 2018-02-22 | Stop reason: HOSPADM

## 2018-02-22 RX ORDER — SODIUM CHLORIDE 0.9 % (FLUSH) 0.9 %
10 SYRINGE (ML) INJECTION EVERY 12 HOURS SCHEDULED
Status: DISCONTINUED | OUTPATIENT
Start: 2018-02-22 | End: 2018-02-23 | Stop reason: HOSPADM

## 2018-02-22 RX ORDER — HYDRALAZINE HYDROCHLORIDE 20 MG/ML
5 INJECTION INTRAMUSCULAR; INTRAVENOUS EVERY 10 MIN PRN
Status: DISCONTINUED | OUTPATIENT
Start: 2018-02-22 | End: 2018-02-22 | Stop reason: HOSPADM

## 2018-02-22 RX ORDER — DIAZEPAM 5 MG/1
5 TABLET ORAL EVERY 6 HOURS PRN
Status: DISCONTINUED | OUTPATIENT
Start: 2018-02-22 | End: 2018-02-23 | Stop reason: HOSPADM

## 2018-02-22 RX ORDER — MORPHINE SULFATE 4 MG/ML
4 INJECTION, SOLUTION INTRAMUSCULAR; INTRAVENOUS EVERY 5 MIN PRN
Status: DISCONTINUED | OUTPATIENT
Start: 2018-02-22 | End: 2018-02-22 | Stop reason: HOSPADM

## 2018-02-22 RX ORDER — LIDOCAINE HYDROCHLORIDE 10 MG/ML
1 INJECTION, SOLUTION EPIDURAL; INFILTRATION; INTRACAUDAL; PERINEURAL ONCE
Status: DISCONTINUED | OUTPATIENT
Start: 2018-02-22 | End: 2018-02-22 | Stop reason: HOSPADM

## 2018-02-22 RX ORDER — ONDANSETRON 2 MG/ML
INJECTION INTRAMUSCULAR; INTRAVENOUS PRN
Status: DISCONTINUED | OUTPATIENT
Start: 2018-02-22 | End: 2018-02-22 | Stop reason: SDUPTHER

## 2018-02-22 RX ORDER — OXYCODONE HYDROCHLORIDE 5 MG/1
5 TABLET ORAL EVERY 4 HOURS PRN
Status: DISCONTINUED | OUTPATIENT
Start: 2018-02-22 | End: 2018-02-23 | Stop reason: HOSPADM

## 2018-02-22 RX ORDER — OXYCODONE HYDROCHLORIDE 5 MG/1
10 TABLET ORAL EVERY 4 HOURS PRN
Status: DISCONTINUED | OUTPATIENT
Start: 2018-02-22 | End: 2018-02-23 | Stop reason: HOSPADM

## 2018-02-22 RX ORDER — SODIUM CHLORIDE 0.9 % (FLUSH) 0.9 %
10 SYRINGE (ML) INJECTION PRN
Status: DISCONTINUED | OUTPATIENT
Start: 2018-02-22 | End: 2018-02-23 | Stop reason: HOSPADM

## 2018-02-22 RX ORDER — LIDOCAINE HYDROCHLORIDE 10 MG/ML
INJECTION, SOLUTION INFILTRATION; PERINEURAL PRN
Status: DISCONTINUED | OUTPATIENT
Start: 2018-02-22 | End: 2018-02-22 | Stop reason: SDUPTHER

## 2018-02-22 RX ORDER — SODIUM CHLORIDE, SODIUM LACTATE, POTASSIUM CHLORIDE, CALCIUM CHLORIDE 600; 310; 30; 20 MG/100ML; MG/100ML; MG/100ML; MG/100ML
INJECTION, SOLUTION INTRAVENOUS CONTINUOUS
Status: DISCONTINUED | OUTPATIENT
Start: 2018-02-22 | End: 2018-02-23 | Stop reason: HOSPADM

## 2018-02-22 RX ORDER — DIPHENHYDRAMINE HCL 25 MG
25 TABLET ORAL EVERY 6 HOURS PRN
Status: DISCONTINUED | OUTPATIENT
Start: 2018-02-22 | End: 2018-02-23 | Stop reason: HOSPADM

## 2018-02-22 RX ORDER — CYCLOBENZAPRINE HCL 10 MG
10 TABLET ORAL 3 TIMES DAILY PRN
Status: DISCONTINUED | OUTPATIENT
Start: 2018-02-22 | End: 2018-02-23 | Stop reason: HOSPADM

## 2018-02-22 RX ORDER — DOCUSATE SODIUM 100 MG/1
100 CAPSULE, LIQUID FILLED ORAL 2 TIMES DAILY
Status: DISCONTINUED | OUTPATIENT
Start: 2018-02-22 | End: 2018-02-23 | Stop reason: HOSPADM

## 2018-02-22 RX ORDER — MORPHINE SULFATE 4 MG/ML
4 INJECTION, SOLUTION INTRAMUSCULAR; INTRAVENOUS
Status: DISCONTINUED | OUTPATIENT
Start: 2018-02-22 | End: 2018-02-23 | Stop reason: HOSPADM

## 2018-02-22 RX ORDER — ATORVASTATIN CALCIUM 40 MG/1
40 TABLET, FILM COATED ORAL NIGHTLY
Status: DISCONTINUED | OUTPATIENT
Start: 2018-02-22 | End: 2018-02-23 | Stop reason: HOSPADM

## 2018-02-22 RX ORDER — MORPHINE SULFATE 4 MG/ML
4 INJECTION, SOLUTION INTRAMUSCULAR; INTRAVENOUS
Status: DISCONTINUED | OUTPATIENT
Start: 2018-02-22 | End: 2018-02-22 | Stop reason: HOSPADM

## 2018-02-22 RX ORDER — FENTANYL CITRATE 50 UG/ML
50 INJECTION, SOLUTION INTRAMUSCULAR; INTRAVENOUS
Status: DISCONTINUED | OUTPATIENT
Start: 2018-02-22 | End: 2018-02-23 | Stop reason: HOSPADM

## 2018-02-22 RX ORDER — LIDOCAINE HYDROCHLORIDE 10 MG/ML
1 INJECTION, SOLUTION EPIDURAL; INFILTRATION; INTRACAUDAL; PERINEURAL
Status: DISCONTINUED | OUTPATIENT
Start: 2018-02-22 | End: 2018-02-22 | Stop reason: HOSPADM

## 2018-02-22 RX ORDER — ONDANSETRON 2 MG/ML
4 INJECTION INTRAMUSCULAR; INTRAVENOUS EVERY 6 HOURS PRN
Status: DISCONTINUED | OUTPATIENT
Start: 2018-02-22 | End: 2018-02-23 | Stop reason: HOSPADM

## 2018-02-22 RX ORDER — MIDAZOLAM HYDROCHLORIDE 1 MG/ML
INJECTION INTRAMUSCULAR; INTRAVENOUS
Status: COMPLETED
Start: 2018-02-22 | End: 2018-02-22

## 2018-02-22 RX ORDER — ROCURONIUM BROMIDE 10 MG/ML
INJECTION, SOLUTION INTRAVENOUS PRN
Status: DISCONTINUED | OUTPATIENT
Start: 2018-02-22 | End: 2018-02-22 | Stop reason: SDUPTHER

## 2018-02-22 RX ADMIN — OXYCODONE HYDROCHLORIDE 5 MG: 5 TABLET ORAL at 21:42

## 2018-02-22 RX ADMIN — SODIUM CHLORIDE, POTASSIUM CHLORIDE, SODIUM LACTATE AND CALCIUM CHLORIDE: 600; 310; 30; 20 INJECTION, SOLUTION INTRAVENOUS at 20:56

## 2018-02-22 RX ADMIN — PROPOFOL 160 MG: 10 INJECTION, EMULSION INTRAVENOUS at 12:48

## 2018-02-22 RX ADMIN — PHENYLEPHRINE HYDROCHLORIDE 160 MCG: 10 INJECTION INTRAVENOUS at 13:00

## 2018-02-22 RX ADMIN — PHENYLEPHRINE HYDROCHLORIDE 80 MCG: 10 INJECTION INTRAVENOUS at 13:19

## 2018-02-22 RX ADMIN — FAMOTIDINE 20 MG: 20 TABLET ORAL at 21:40

## 2018-02-22 RX ADMIN — PHENYLEPHRINE HYDROCHLORIDE 160 MCG: 10 INJECTION INTRAVENOUS at 13:34

## 2018-02-22 RX ADMIN — DEXAMETHASONE SODIUM PHOSPHATE 10 MG: 10 INJECTION INTRAMUSCULAR; INTRAVENOUS at 13:03

## 2018-02-22 RX ADMIN — PHENYLEPHRINE HYDROCHLORIDE 160 MCG: 10 INJECTION INTRAVENOUS at 13:25

## 2018-02-22 RX ADMIN — EPHEDRINE SULFATE 10 MG: 50 INJECTION, SOLUTION INTRAMUSCULAR; INTRAVENOUS; SUBCUTANEOUS at 13:43

## 2018-02-22 RX ADMIN — MIDAZOLAM 2 MG: 1 INJECTION INTRAMUSCULAR; INTRAVENOUS at 12:35

## 2018-02-22 RX ADMIN — SODIUM CHLORIDE, SODIUM LACTATE, POTASSIUM CHLORIDE, AND CALCIUM CHLORIDE: 600; 310; 30; 20 INJECTION, SOLUTION INTRAVENOUS at 12:42

## 2018-02-22 RX ADMIN — PHENYLEPHRINE HYDROCHLORIDE 160 MCG: 10 INJECTION INTRAVENOUS at 13:09

## 2018-02-22 RX ADMIN — CEFAZOLIN SODIUM 2 G: 2 SOLUTION INTRAVENOUS at 21:40

## 2018-02-22 RX ADMIN — FENTANYL CITRATE 50 MCG: 50 INJECTION, SOLUTION INTRAMUSCULAR; INTRAVENOUS at 12:48

## 2018-02-22 RX ADMIN — SUGAMMADEX 100 MG: 100 INJECTION, SOLUTION INTRAVENOUS at 14:11

## 2018-02-22 RX ADMIN — ATORVASTATIN CALCIUM 40 MG: 40 TABLET, FILM COATED ORAL at 21:40

## 2018-02-22 RX ADMIN — ROCURONIUM BROMIDE 35 MG: 10 INJECTION INTRAVENOUS at 12:48

## 2018-02-22 RX ADMIN — DIAZEPAM 5 MG: 5 TABLET ORAL at 21:40

## 2018-02-22 RX ADMIN — ONDANSETRON HYDROCHLORIDE 4 MG: 2 SOLUTION INTRAMUSCULAR; INTRAVENOUS at 13:58

## 2018-02-22 RX ADMIN — Medication 10 ML: at 23:24

## 2018-02-22 RX ADMIN — EPHEDRINE SULFATE 10 MG: 50 INJECTION, SOLUTION INTRAMUSCULAR; INTRAVENOUS; SUBCUTANEOUS at 13:55

## 2018-02-22 RX ADMIN — SODIUM CHLORIDE, SODIUM LACTATE, POTASSIUM CHLORIDE, AND CALCIUM CHLORIDE: 600; 310; 30; 20 INJECTION, SOLUTION INTRAVENOUS at 13:27

## 2018-02-22 RX ADMIN — LIDOCAINE HYDROCHLORIDE 5 ML: 10 INJECTION, SOLUTION INFILTRATION; PERINEURAL at 12:48

## 2018-02-22 RX ADMIN — ROCURONIUM BROMIDE 15 MG: 10 INJECTION INTRAVENOUS at 13:49

## 2018-02-22 RX ADMIN — CEFAZOLIN 1 G: 1 INJECTION, POWDER, FOR SOLUTION INTRAMUSCULAR; INTRAVENOUS; PARENTERAL at 13:02

## 2018-02-22 RX ADMIN — DOCUSATE SODIUM 100 MG: 100 CAPSULE, LIQUID FILLED ORAL at 21:40

## 2018-02-22 ASSESSMENT — PAIN SCALES - GENERAL
PAINLEVEL_OUTOF10: 0
PAINLEVEL_OUTOF10: 6

## 2018-02-22 ASSESSMENT — PAIN DESCRIPTION - ORIENTATION: ORIENTATION: LEFT

## 2018-02-22 ASSESSMENT — LIFESTYLE VARIABLES: SMOKING_STATUS: 0

## 2018-02-22 ASSESSMENT — PAIN - FUNCTIONAL ASSESSMENT: PAIN_FUNCTIONAL_ASSESSMENT: 0-10

## 2018-02-22 ASSESSMENT — PAIN DESCRIPTION - LOCATION: LOCATION: SHOULDER

## 2018-02-22 ASSESSMENT — ENCOUNTER SYMPTOMS: SHORTNESS OF BREATH: 0

## 2018-02-22 ASSESSMENT — PAIN DESCRIPTION - PAIN TYPE: TYPE: SURGICAL PAIN

## 2018-02-22 NOTE — PLAN OF CARE
Problem: Pain:  Goal: Pain level will decrease  Pain level will decrease  Outcome: Ongoing    Goal: Control of acute pain  Control of acute pain  Outcome: Ongoing    Goal: Control of chronic pain  Control of chronic pain  Outcome: Ongoing      Problem: Falls - Risk of:  Goal: Will remain free from falls  Will remain free from falls  Outcome: Ongoing    Goal: Absence of physical injury  Absence of physical injury  Outcome: Ongoing

## 2018-02-22 NOTE — BRIEF OP NOTE
Brief Postoperative Note  ______________________________________________________________    Patient: Kenton Nath  YOB: 1945  MRN: [id number]  Date of Procedure: 2/22/2018    Pre-Op Diagnosis: M12.812    Post-Op Diagnosis: Same       Procedure(s):  SHOULDER TOTAL ARTHROPLASTY REVERSE    Anesthesia: Regional, General    Surgeon(s):  Garcia Dick MD    Staff:  Scrub Person First: Lenin Valles  Scrub Person Second: Katharine Joyce     Estimated Blood Loss: * No values recorded between 2/22/2018 12:42 PM and 2/22/2018  1:80 PM * mL    Complications: None    Specimens:   * No specimens in log *    Implants:    Implant Name Type Inv.  Item Serial No.  Lot No. LRB No. Used   IMPL GLENOID BASEPLATE SM Shoulder/Arm/Wrist/Hand IMPL GLENOID BASEPLATE SM  [employer] [id number] Left 1   IMPL GLENOSPHERE REVERSE 36 +4 LAT Shoulder/Arm/Wrist/Hand IMPL GLENOSPHERE REVERSE 36 +4 LAT  [employer] [id number] Left 1   SCREW CENTRAL 20MM Screw/Plate/Nail/Albert SCREW CENTRAL 20MM  [employer] [id number] Left 1   SCREW GLENOID PERIPH LK 36MM Screw/Plate/Nail/Albert SCREW GLENOID PERIPH LK 36MM  [employer] [id number] Left 1   SCREW PERIPH LK GLENOID 30MM Screw/Plate/Nail/Albert SCREW PERIPH LK GLENOID 30MM  [employer] [id number] Left 1   IMPL STEM REVERSE SZ 7 Shoulder/Arm/Wrist/Hand IMPL STEM REVERSE SZ 7  [employer] [id number] Left 1   IMPL SHOULDER REVERSE CUP SZ 36 +2 RT Shoulder/Arm/Wrist/Hand IMPL SHOULDER REVERSE CUP SZ 36 +2 RT  [employer] [id number] Left 1   IMPL HUMERAL INSRT SM 36+3 Shoulder/Arm/Wrist/Hand IMPL HUMERAL INSRT SM 36+3   [employer] [id number] Left 1         Drains:      Findings: see op note    Garcia Dick MD  Date: 2/22/2018  Time: 2:17 PM

## 2018-02-22 NOTE — ANESTHESIA PRE PROCEDURE
Department of Anesthesiology  Preprocedure Note       Name:  Kathi Lea   Age:  67 y.o.  :  1945                                          MRN:  [id number]         Date:  2018      Surgeon: Mansoor Soler):  Amy Isaacs MD    Procedure: Procedure(s):  SHOULDER TOTAL ARTHROPLASTY REVERSE    Medications prior to admission:   Prior to Admission medications    Medication Sig Start Date End Date Taking?  Authorizing Provider   estrogens, conjugated, (PREMARIN) 0.625 MG tablet Take 0.625 mg by mouth daily    Historical Provider, MD   rosuvastatin (CRESTOR) 10 MG tablet Take 10 mg by mouth daily    Historical Provider, MD   Coenzyme Q10 (CO Q 10) 10 MG CAPS Take by mouth daily    Historical Provider, MD       Current medications:    Current Facility-Administered Medications   Medication Dose Route Frequency Provider Last Rate Last Dose    lactated ringers infusion   Intravenous Continuous Amy Isaacs MD        lidocaine PF 1 % injection 1 mL  1 mL Intradermal Once Amy Isaacs MD        midazolam (VERSED) 2 MG/2ML injection             ceFAZolin (ANCEF) 1 g in sterile water 10 mL IV syringe  1 g Intravenous Once Amy Isaacs MD        HYDROmorphone (DILAUDID) injection 0.25 mg  0.25 mg Intravenous Q5 Min PRN Whitney Quinn CRNA        HYDROmorphone (DILAUDID) injection 0.5 mg  0.5 mg Intravenous Q5 Min PRN Whitney Quinn CRNA        morphine injection 2 mg  2 mg Intravenous Q5 Min PRN Whitney Quinn CRNA        morphine injection 4 mg  4 mg Intravenous Q5 Min PRN Whitney Quinn CRNA        diphenhydrAMINE (BENADRYL) injection 12.5 mg  12.5 mg Intravenous Once PRN Whitney Quinn CRNA        promethazine [facility]) injection 6.25 mg  6.25 mg Intravenous Once PRN Whitney Quinn CRNA        metoclopramide (REGLAN) injection 10 mg  10 mg Intravenous Once PRN Whitney Quinn CRNA        labetalol (NORMODYNE;TRANDATE) injection 5 mg  5 mg Lab Results   Component Value Date    WBC 7.4 02/15/2018    RBC 4.45 02/15/2018    HGB 14.3 02/15/2018    HCT 43.4 02/15/2018    MCV 97.5 02/15/2018    RDW 12.5 02/15/2018     02/15/2018       CMP:   Lab Results   Component Value Date     02/15/2018    K 4.3 02/15/2018     02/15/2018    CO2 30 02/15/2018    BUN 15 02/15/2018    CREATININE 0.7 02/15/2018    LABGLOM >60 02/15/2018    GLUCOSE 80 02/15/2018    PROT 6.7 02/15/2018    CALCIUM 9.4 02/15/2018    BILITOT <0.2 02/15/2018    ALKPHOS 60 02/15/2018    AST 22 02/15/2018    ALT 14 02/15/2018       POC Tests: No results for input(s): POCGLU, POCNA, POCK, POCCL, POCBUN, POCHEMO, POCHCT in the last 72 hours. Coags:   Lab Results   Component Value Date    PROTIME 12.6 02/15/2018    INR 0.95 02/15/2018       HCG (If Applicable): No results found for: PREGTESTUR, PREGSERUM, HCG, HCGQUANT     ABGs: No results found for: PHART, PO2ART, RIJ2ORD, QUN1OUM, BEART, X6RRQPBC     Type & Screen (If Applicable):  No results found for: [facility]    Anesthesia Evaluation  Patient summary reviewed and Nursing notes reviewed no history of anesthetic complications:   Airway: Mallampati: II  TM distance: >3 FB   Neck ROM: full  Mouth opening: > = 3 FB Dental: normal exam   (+) caps      Pulmonary:Negative Pulmonary ROS and normal exam  breath sounds clear to auscultation      (-) shortness of breath and not a current smoker          Patient did not smoke on day of surgery.                  Cardiovascular:        (-) hypertension, CAD,  angina and  CHF    NYHA Classification: I  ECG reviewed  Rhythm: regular  Rate: normal           Beta Blocker:  Not on Beta Blocker         Neuro/Psych:   Negative Neuro/Psych ROS     (-) seizures, CVA and depression/anxiety             ROS comment: Has central  brain stimultor for tremor  GI/Hepatic/Renal: Neg GI/Hepatic/Renal ROS       (-) hiatal hernia and GERD       Endo/Other: Negative Endo/Other ROS             Pt had PAT

## 2018-02-22 NOTE — ANESTHESIA POSTPROCEDURE EVALUATION
Department of Anesthesiology  Postprocedure Note    Patient: Becky Mcclure  MRN: [id number]  [place]: 1945  Date of evaluation: 2/22/2018  Time:  2:26 PM     Procedure Summary     Date:  02/22/18 Room / Location:  [facility] OR 09 / [facility] OR    Anesthesia Start:  1242 Anesthesia Stop:      Procedure:  SHOULDER TOTAL ARTHROPLASTY REVERSE (Left Shoulder) Diagnosis:  (R28.821)    Surgeon:  Christophe Proctor MD Responsible Provider:  Sara Barajas CRNA    Anesthesia Type:  general, regional ASA Status:  2          Anesthesia Type: general, regional    Tamia Phase I: Tamia Score: 10    Tamia Phase II:      Last vitals: Reviewed and per EMR flowsheets. Anesthesia Post Evaluation    Patient location during evaluation: PACU  Patient participation: waiting for patient participation  Level of consciousness: awake and lethargic  Pain score: 0  Airway patency: patent  Nausea & Vomiting: no nausea and no vomiting  Complications: no  Cardiovascular status: blood pressure returned to baseline  Respiratory status: acceptable  Hydration status: euvolemic  Comments: Pt transported with oxygen.   Report to RN

## 2018-02-22 NOTE — H&P
Pt Name: Daquan Orozco  MRN: [id number]  [place]: 1945  Date of evaluation: 2/22/2018    H&P including current review of systems was updated in the paper chart and/or the document previously scanned into the record. There have been no significant changes or new problems since the original evaluation. The patient's problems continue and indications for contemplated procedure have not changed.     Electronically signed by Mau Jackson MD on 2/22/2018 at 10:29 AM

## 2018-02-23 VITALS
WEIGHT: 116 LBS | OXYGEN SATURATION: 95 % | DIASTOLIC BLOOD PRESSURE: 76 MMHG | SYSTOLIC BLOOD PRESSURE: 122 MMHG | BODY MASS INDEX: 20.55 KG/M2 | HEIGHT: 63 IN | TEMPERATURE: 97.8 F | HEART RATE: 91 BPM | RESPIRATION RATE: 17 BRPM

## 2018-02-23 LAB
ANION GAP SERPL CALCULATED.3IONS-SCNC: 9 MMOL/L (ref 7–19)
BUN BLDV-MCNC: 11 MG/DL (ref 8–23)
CALCIUM SERPL-MCNC: 8.3 MG/DL (ref 8.8–10.2)
CHLORIDE BLD-SCNC: 104 MMOL/L (ref 98–111)
CO2: 28 MMOL/L (ref 22–29)
CREAT SERPL-MCNC: 0.5 MG/DL (ref 0.5–0.9)
GFR NON-AFRICAN AMERICAN: >60
GLUCOSE BLD-MCNC: 120 MG/DL (ref 74–109)
HCT VFR BLD CALC: 34 % (ref 37–47)
HEMOGLOBIN: 11.3 G/DL (ref 12–16)
MCH RBC QN AUTO: 32.3 PG (ref 27–31)
MCHC RBC AUTO-ENTMCNC: 33.2 G/DL (ref 33–37)
MCV RBC AUTO: 97.1 FL (ref 81–99)
PDW BLD-RTO: 12.2 % (ref 11.5–14.5)
PLATELET # BLD: 169 K/UL (ref 130–400)
PMV BLD AUTO: 10.1 FL (ref 9.4–12.3)
POTASSIUM REFLEX MAGNESIUM: 4.3 MMOL/L (ref 3.5–5)
RBC # BLD: 3.5 M/UL (ref 4.2–5.4)
SODIUM BLD-SCNC: 141 MMOL/L (ref 136–145)
WBC # BLD: 9 K/UL (ref 4.8–10.8)

## 2018-02-23 PROCEDURE — 2580000003 HC RX 258: Performed by: ORTHOPAEDIC SURGERY

## 2018-02-23 PROCEDURE — G8987 SELF CARE CURRENT STATUS: HCPCS

## 2018-02-23 PROCEDURE — 97161 PT EVAL LOW COMPLEX 20 MIN: CPT

## 2018-02-23 PROCEDURE — 97165 OT EVAL LOW COMPLEX 30 MIN: CPT

## 2018-02-23 PROCEDURE — 6370000000 HC RX 637 (ALT 250 FOR IP): Performed by: ORTHOPAEDIC SURGERY

## 2018-02-23 PROCEDURE — 97530 THERAPEUTIC ACTIVITIES: CPT

## 2018-02-23 PROCEDURE — 85027 COMPLETE CBC AUTOMATED: CPT

## 2018-02-23 PROCEDURE — 80048 BASIC METABOLIC PNL TOTAL CA: CPT

## 2018-02-23 PROCEDURE — 97116 GAIT TRAINING THERAPY: CPT

## 2018-02-23 PROCEDURE — 36415 COLL VENOUS BLD VENIPUNCTURE: CPT

## 2018-02-23 PROCEDURE — G8979 MOBILITY GOAL STATUS: HCPCS

## 2018-02-23 PROCEDURE — G8978 MOBILITY CURRENT STATUS: HCPCS

## 2018-02-23 PROCEDURE — G8988 SELF CARE GOAL STATUS: HCPCS

## 2018-02-23 PROCEDURE — 6360000002 HC RX W HCPCS: Performed by: ORTHOPAEDIC SURGERY

## 2018-02-23 RX ORDER — CYCLOBENZAPRINE HCL 10 MG
10 TABLET ORAL 3 TIMES DAILY PRN
Qty: 15 TABLET | Refills: 0 | Status: SHIPPED | OUTPATIENT
Start: 2018-02-23 | End: 2018-03-05

## 2018-02-23 RX ORDER — OXYCODONE AND ACETAMINOPHEN 10; 325 MG/1; MG/1
1 TABLET ORAL EVERY 6 HOURS PRN
Qty: 50 TABLET | Refills: 0 | Status: SHIPPED | OUTPATIENT
Start: 2018-02-23 | End: 2018-03-02

## 2018-02-23 RX ORDER — DOCUSATE SODIUM 100 MG/1
100 CAPSULE, LIQUID FILLED ORAL 2 TIMES DAILY
Qty: 30 CAPSULE | Refills: 0 | Status: SHIPPED | OUTPATIENT
Start: 2018-02-23

## 2018-02-23 RX ORDER — OXYCODONE HYDROCHLORIDE 10 MG/1
10 TABLET ORAL EVERY 8 HOURS PRN
Qty: 50 TABLET | Refills: 0 | Status: SHIPPED | OUTPATIENT
Start: 2018-02-23 | End: 2018-03-02

## 2018-02-23 RX ADMIN — DOCUSATE SODIUM 100 MG: 100 CAPSULE, LIQUID FILLED ORAL at 08:59

## 2018-02-23 RX ADMIN — ACETAMINOPHEN 650 MG: 325 TABLET, FILM COATED ORAL at 02:44

## 2018-02-23 RX ADMIN — ENOXAPARIN SODIUM 40 MG: 40 INJECTION SUBCUTANEOUS at 08:59

## 2018-02-23 RX ADMIN — CYCLOBENZAPRINE HYDROCHLORIDE 10 MG: 10 TABLET, FILM COATED ORAL at 02:45

## 2018-02-23 RX ADMIN — CEFAZOLIN SODIUM 2 G: 2 SOLUTION INTRAVENOUS at 05:32

## 2018-02-23 RX ADMIN — THERA TABS 1 TABLET: TAB at 08:59

## 2018-02-23 RX ADMIN — Medication 10 ML: at 09:03

## 2018-02-23 RX ADMIN — OXYCODONE HYDROCHLORIDE 10 MG: 5 TABLET ORAL at 02:44

## 2018-02-23 RX ADMIN — OXYCODONE HYDROCHLORIDE 10 MG: 5 TABLET ORAL at 11:52

## 2018-02-23 ASSESSMENT — PAIN SCALES - GENERAL
PAINLEVEL_OUTOF10: 0
PAINLEVEL_OUTOF10: 10
PAINLEVEL_OUTOF10: 9

## 2018-02-23 ASSESSMENT — PAIN DESCRIPTION - DESCRIPTORS: DESCRIPTORS: ACHING

## 2018-02-23 ASSESSMENT — PAIN DESCRIPTION - ORIENTATION: ORIENTATION: LEFT

## 2018-02-23 ASSESSMENT — PAIN SCALES - WONG BAKER: WONGBAKER_NUMERICALRESPONSE: 2

## 2018-02-23 ASSESSMENT — PAIN DESCRIPTION - LOCATION: LOCATION: SHOULDER

## 2018-02-23 ASSESSMENT — PAIN DESCRIPTION - PAIN TYPE: TYPE: SURGICAL PAIN

## 2018-02-23 NOTE — PLAN OF CARE
Problem: Falls - Risk of  Goal: Absence of falls  Outcome: Ongoing      Problem: Pain:  Goal: Pain level will decrease  Pain level will decrease   Outcome: Ongoing      Problem: Falls - Risk of:  Goal: Will remain free from falls  Will remain free from falls   Outcome: Ongoing

## 2018-02-23 NOTE — DISCHARGE SUMMARY
NAME: Kieran Mock  : 1945  MRN: [id number]      Admission Date: 2018    Discharge Date:  2018    Final Diagnoses: Left rotator cuff tear arthropathy [M12.812]    Procedures: Left reverse TSA    Consultations:     Reason for Admission: 67 y.o. YO female with progressive loss of function and increasing pain of the upper extremity due to a massive irreparable tear of the rotator cuff. Due to loss of function and progressive pain, a reverse shoulder arthroplasty is planned to improve function and decrease pain. Surgical evaluation was discussed and the patient wished to proceed understanding risks, benefits, and alternatives. The surgical indications were to relieve pain, improve function, and prevent future disability in regards to the shoulder pathology dictated in the aboved diagnoses. Risks included, but were not limited to, that of anesthesia, bleeding, infection, pain, damage to local structures, postoperative dislocation, need for further surgery, failure of repair, stiffness, failure of implants, and loss of function. The patient has failed a combination of the following improve pain and function: physical therapy >12 wks, corticosteroid injections, NSAIDs, activity modification. Hospital Course: The patient was admitted with the above named diagnosis, surgery was performed and tolerated well. At the time of discharge, the patient was afebrile, vitals stable, pain was controlled with oral medication, they were tolerating a by mouth diet, and voiding appropriately. Physical therapy and occupational therapy were consulted. Given these findings they were deemed suitable to be discharged. Disposition: Home    Activity: Non-weight bearing left upper    Wound Instructions: see postop instructions    Diet: regular diet    Resume home meds:   Prior to Admission medications    Medication Sig Start Date End Date Taking?  Authorizing Provider   oxyCODONE-acetaminophen (PERCOCET)  MG per

## 2018-03-05 ENCOUNTER — OFFICE VISIT (OUTPATIENT)
Dept: FAMILY MEDICINE CLINIC | Facility: CLINIC | Age: 73
End: 2018-03-05

## 2018-03-05 VITALS
RESPIRATION RATE: 16 BRPM | BODY MASS INDEX: 20.91 KG/M2 | WEIGHT: 118 LBS | DIASTOLIC BLOOD PRESSURE: 80 MMHG | OXYGEN SATURATION: 97 % | SYSTOLIC BLOOD PRESSURE: 132 MMHG | HEART RATE: 82 BPM | HEIGHT: 63 IN

## 2018-03-05 DIAGNOSIS — S43.422D SPRAIN OF LEFT ROTATOR CUFF CAPSULE, SUBSEQUENT ENCOUNTER: ICD-10-CM

## 2018-03-05 DIAGNOSIS — E78.2 MIXED HYPERLIPIDEMIA: Primary | ICD-10-CM

## 2018-03-05 PROBLEM — S43.422A SPRAIN OF LEFT ROTATOR CUFF CAPSULE: Status: ACTIVE | Noted: 2018-03-05

## 2018-03-05 PROCEDURE — 99213 OFFICE O/P EST LOW 20 MIN: CPT | Performed by: FAMILY MEDICINE

## 2018-03-05 RX ORDER — OXYCODONE HYDROCHLORIDE 10 MG/1
10 TABLET ORAL EVERY 6 HOURS PRN
COMMUNITY
Start: 2018-02-23 | End: 2018-06-05

## 2018-03-05 RX ORDER — CYCLOBENZAPRINE HCL 10 MG
10 TABLET ORAL
COMMUNITY
Start: 2018-02-23 | End: 2018-03-05

## 2018-03-05 NOTE — PROGRESS NOTES
"Subjective   Lavinia Aguilar is a 72 y.o. female.     Chief Complaint   Patient presents with   • Hospital Follow Up     [facility] - Left Rotator Cuff Replacement        History of Present Illness     she is doing well after her rotator cuff surgery--starting p.t today--she is toleraing crestor witujhout myalgias      Current Outpatient Prescriptions:   •  Coenzyme Q-10 100 MG capsule, Take 100 mg by mouth., Disp: , Rfl:   •  cyclobenzaprine (FLEXERIL) 10 MG tablet, Take 10 mg by mouth., Disp: , Rfl:   •  estrogens, conjugated, (PREMARIN) 0.625 MG tablet, Take 1 tablet by mouth Daily. Take daily, Disp: 90 tablet, Rfl: 1  •  Multiple Vitamins-Minerals (MULTIVITAMIN ADULT PO), Take  by mouth., Disp: , Rfl:   •  oxyCODONE (ROXICODONE) 10 MG tablet, 10 mg Every 6 (Six) Hours As Needed (Patient takes at bedtime)., Disp: , Rfl:   •  rosuvastatin (CRESTOR) 10 MG tablet, Take 1 tablet by mouth Daily., Disp: 90 tablet, Rfl: 1  No Known Allergies    Past Medical History:   Diagnosis Date   • Hyperlipidemia    • Left rotator cuff tear      Past Surgical History:   Procedure Laterality Date   • COLONOSCOPY     • HYSTERECTOMY     • INSERTION / REMOVAL CRANIAL DBS GENERATOR     • SHOULDER ROTATOR CUFF REPAIR Left 02/2018   • SHOULDER SURGERY Right    • TONSILLECTOMY     • TUBAL ABDOMINAL LIGATION         Review of Systems   Constitutional: Negative.    HENT: Negative.    Eyes: Negative.    Respiratory: Negative.    Cardiovascular: Negative.    Gastrointestinal: Negative.    Endocrine: Negative.    Musculoskeletal: Positive for joint swelling.   Skin: Negative.    Allergic/Immunologic: Negative.    Neurological: Negative.    Hematological: Negative.    Psychiatric/Behavioral: Negative.        Objective  /80  Pulse 82  Resp 16  Ht 160 cm (63\")  Wt 53.5 kg (118 lb)  SpO2 97%  BMI 20.9 kg/m2  Physical Exam   Constitutional: She is oriented to person, place, and time. She appears well-developed and well-nourished. "   HENT:   Head: Normocephalic and atraumatic.   Right Ear: External ear normal.   Left Ear: External ear normal.   Nose: Nose normal.   Mouth/Throat: Oropharynx is clear and moist.   Eyes: Conjunctivae and EOM are normal. Pupils are equal, round, and reactive to light.   Neck: Normal range of motion. Neck supple.   Cardiovascular: Normal rate, regular rhythm, normal heart sounds and intact distal pulses.    Pulmonary/Chest: Effort normal and breath sounds normal.   Abdominal: Soft. Bowel sounds are normal.   Musculoskeletal: She exhibits tenderness.   Neurological: She is alert and oriented to person, place, and time. She has normal reflexes.   Skin: Skin is warm and dry.   Psychiatric: She has a normal mood and affect. Her behavior is normal. Judgment and thought content normal.   Nursing note and vitals reviewed.      Assessment/Plan   Lavinia was seen today for hospital follow up.    Diagnoses and all orders for this visit:    Mixed hyperlipidemia    Sprain of left rotator cuff capsule, subsequent encounter    Patient's BMI is within normal parameters. No follow-up required.           No orders of the defined types were placed in this encounter.    Current outpatient and discharge medications have been reconciled for the patient.  Praveen Leung MD  Follow up: 3 month(s)

## 2018-06-05 ENCOUNTER — OFFICE VISIT (OUTPATIENT)
Dept: FAMILY MEDICINE CLINIC | Facility: CLINIC | Age: 73
End: 2018-06-05

## 2018-06-05 VITALS
HEART RATE: 88 BPM | BODY MASS INDEX: 20.55 KG/M2 | WEIGHT: 116 LBS | SYSTOLIC BLOOD PRESSURE: 128 MMHG | DIASTOLIC BLOOD PRESSURE: 82 MMHG | OXYGEN SATURATION: 99 % | HEIGHT: 63 IN | RESPIRATION RATE: 16 BRPM

## 2018-06-05 DIAGNOSIS — Z79.890 HORMONE REPLACEMENT THERAPY: ICD-10-CM

## 2018-06-05 DIAGNOSIS — E78.2 MIXED HYPERLIPIDEMIA: Primary | ICD-10-CM

## 2018-06-05 PROCEDURE — 99213 OFFICE O/P EST LOW 20 MIN: CPT | Performed by: FAMILY MEDICINE

## 2018-06-05 PROCEDURE — G0439 PPPS, SUBSEQ VISIT: HCPCS | Performed by: FAMILY MEDICINE

## 2018-06-05 RX ORDER — ROSUVASTATIN CALCIUM 10 MG/1
10 TABLET, COATED ORAL DAILY
Qty: 90 TABLET | Refills: 1 | Status: SHIPPED | OUTPATIENT
Start: 2018-06-05 | End: 2018-12-04 | Stop reason: SDUPTHER

## 2018-06-05 NOTE — PROGRESS NOTES
QUICK REFERENCE INFORMATION:  The ABCs of the Annual Wellness Visit    Subsequent Medicare Wellness Visit    HEALTH RISK ASSESSMENT    1945    Recent Hospitalizations:  No hospitalization(s) within the last year..        Current Medical Providers:  Patient Care Team:  Praveen Leung MD as PCP - General (Family Medicine)  Praveen Leung MD as PCP - Claims Attributed        Smoking Status:  History   Smoking Status   • Never Smoker   Smokeless Tobacco   • Never Used       Alcohol Consumption:  History   Alcohol Use No       Depression Screen:   PHQ-2/PHQ-9 Depression Screening 6/5/2018   Little interest or pleasure in doing things 0   Feeling down, depressed, or hopeless 0   Trouble falling or staying asleep, or sleeping too much 0   Feeling tired or having little energy 0   Poor appetite or overeating 0   Feeling bad about yourself - or that you are a failure or have let yourself or your family down 0   Trouble concentrating on things, such as reading the newspaper or watching television 0   Moving or speaking so slowly that other people could have noticed. Or the opposite - being so fidgety or restless that you have been moving around a lot more than usual 0   Thoughts that you would be better off dead, or of hurting yourself in some way 0   Total Score 0   If you checked off any problems, how difficult have these problems made it for you to do your work, take care of things at home, or get along with other people? Not difficult at all       Health Habits and Functional and Cognitive Screening:  Functional & Cognitive Status 6/5/2018   Do you have difficulty preparing food and eating? No   Do you have difficulty bathing yourself, getting dressed or grooming yourself? No   Do you have difficulty using the toilet? No   Do you have difficulty moving around from place to place? No   Do you have trouble with steps or getting out of a bed or a chair? No   In the past year have you fallen or experienced a  near fall? Yes   Current Diet Well Balanced Diet   Dental Exam Not up to date   Eye Exam Up to date   Exercise (times per week) 3 times per week   Current Exercise Activities Include Gardening   Do you need help using the phone?  No   Are you deaf or do you have serious difficulty hearing?  No   Do you need help with transportation? No   Do you need help shopping? No   Do you need help preparing meals?  No   Do you need help with housework?  No   Do you need help with laundry? No   Do you need help taking your medications? No   Do you need help managing money? No   Do you ever drive or ride in a car without wearing a seat belt? No   Have you felt unusual stress, anger or loneliness in the last month? No   Who do you live with? Spouse   If you need help, do you have trouble finding someone available to you? No   Have you been bothered in the last four weeks by sexual problems? No   Do you have difficulty concentrating, remembering or making decisions? No           Does the patient have evidence of cognitive impairment? No    Aspirin use counseling: Does not need ASA (and currently is not on it)      Recent Lab Results:  CMP:  Lab Results   Component Value Date    GLU 92 12/08/2017    BUN 14 12/08/2017    CREATININE 0.73 12/08/2017    EGFRIFNONA 78 12/08/2017    EGFRIFAFRI 95 12/08/2017    BCR 19.2 12/08/2017     12/08/2017    K 4.1 12/08/2017    CO2 32.0 (H) 12/08/2017    CALCIUM 9.3 12/08/2017    PROTENTOTREF 6.9 12/08/2017    ALBUMIN 4.10 12/08/2017    LABGLOBREF 2.8 12/08/2017    LABIL2 1.5 12/08/2017    BILITOT 0.4 12/08/2017    ALKPHOS 62 12/08/2017    AST 30 12/08/2017    ALT 39 12/08/2017     Lipid Panel:  Lab Results   Component Value Date    TRIG 69 12/08/2017    HDL 91 12/08/2017    VLDL 13.8 12/08/2017     HbA1c:       Visual Acuity:   Visual Acuity Screening    Right eye Left eye Both eyes   Without correction: 20/20 20/20 20/20   With correction:          Age-appropriate Screening Schedule:  Refer  to the list below for future screening recommendations based on patient's age, sex and/or medical conditions. Orders for these recommended tests are listed in the plan section. The patient has been provided with a written plan.    Health Maintenance   Topic Date Due   • TDAP/TD VACCINES (1 - Tdap) 06/04/1964   • ZOSTER VACCINE (1 of 2) 06/04/1995   • PNEUMOCOCCAL VACCINES (65+ LOW/MEDIUM RISK) (1 of 2 - PCV13) 06/04/2010   • COLONOSCOPY  11/01/2016   • INFLUENZA VACCINE  08/01/2018   • LIPID PANEL  12/08/2018   • MAMMOGRAM  12/08/2019        Subjective   History of Present Illness    Lavinia Aguilar is a 73 y.o. female who presents for an Subsequent Wellness Visit.    The following portions of the patient's history were reviewed and updated as appropriate: allergies, current medications, past family history, past medical history, past social history, past surgical history and problem list.    Outpatient Medications Prior to Visit   Medication Sig Dispense Refill   • Coenzyme Q-10 100 MG capsule Take 100 mg by mouth.     • estrogens, conjugated, (PREMARIN) 0.625 MG tablet Take 1 tablet by mouth Daily. Take daily 90 tablet 1   • Multiple Vitamins-Minerals (MULTIVITAMIN ADULT PO) Take  by mouth.     • rosuvastatin (CRESTOR) 10 MG tablet Take 1 tablet by mouth Daily. 90 tablet 1   • oxyCODONE (ROXICODONE) 10 MG tablet 10 mg Every 6 (Six) Hours As Needed (Patient takes at bedtime).       No facility-administered medications prior to visit.        Patient Active Problem List   Diagnosis   • Mixed hyperlipidemia   • Hormone replacement therapy   • Cyst of lip   • Cold   • Palpitations   • Sprain of left rotator cuff capsule       Advance Care Planning:  has an advance directive - a copy HAS NOT been provided    Identification of Risk Factors:  Risk factors include: cardiovascular risk.    Review of Systems    Compared to one year ago, the patient feels her physical health is worse.  Compared to one year ago, the patient  "feels her mental health is the same.    Objective     Physical Exam    Vitals:    06/05/18 0936   BP: 128/82   Pulse: 88   Resp: 16   SpO2: 99%   Weight: 52.6 kg (116 lb)   Height: 160 cm (63\")       Patient's Body mass index is 20.55 kg/m². BMI is within normal parameters. No follow-up required.      Assessment/Plan   Patient Self-Management and Personalized Health Advice  The patient has been provided with information about: fall prevention and preventive services including:   · Fall Risk assessment done.    Visit Diagnoses:    ICD-10-CM ICD-9-CM   1. Mixed hyperlipidemia E78.2 272.2   2. Hormone replacement therapy Z79.890 V07.4       Orders Placed This Encounter   Procedures   • Comprehensive metabolic panel   • Lipid Panel w/ Chol/HDL Ratio   • CBC w AUTO Differential     Order Specific Question:   Manual Differential     Answer:   No       Outpatient Encounter Prescriptions as of 6/5/2018   Medication Sig Dispense Refill   • Coenzyme Q-10 100 MG capsule Take 100 mg by mouth.     • estrogens, conjugated, (PREMARIN) 0.625 MG tablet Take 1 tablet by mouth Daily. Take daily 90 tablet 1   • Multiple Vitamins-Minerals (MULTIVITAMIN ADULT PO) Take  by mouth.     • rosuvastatin (CRESTOR) 10 MG tablet Take 1 tablet by mouth Daily. 90 tablet 1   • [DISCONTINUED] oxyCODONE (ROXICODONE) 10 MG tablet 10 mg Every 6 (Six) Hours As Needed (Patient takes at bedtime).       No facility-administered encounter medications on file as of 6/5/2018.        Reviewed use of high risk medication in the elderly: yes  Reviewed for potential of harmful drug interactions in the elderly: yes    Follow Up:  Return in about 6 months (around 12/5/2018).     An After Visit Summary and PPPS with all of these plans were given to the patient.         "

## 2018-06-05 NOTE — PATIENT INSTRUCTIONS
Fall Prevention in the Home  Falls can cause injuries. They can happen to people of all ages. There are many things you can do to make your home safe and to help prevent falls.  What can I do on the outside of my home?  · Regularly fix the edges of walkways and driveways and fix any cracks.  · Remove anything that might make you trip as you walk through a door, such as a raised step or threshold.  · Trim any bushes or trees on the path to your home.  · Use bright outdoor lighting.  · Clear any walking paths of anything that might make someone trip, such as rocks or tools.  · Regularly check to see if handrails are loose or broken. Make sure that both sides of any steps have handrails.  · Any raised decks and porches should have guardrails on the edges.  · Have any leaves, snow, or ice cleared regularly.  · Use sand or salt on walking paths during winter.  · Clean up any spills in your garage right away. This includes oil or grease spills.  What can I do in the bathroom?  · Use night lights.  · Install grab bars by the toilet and in the tub and shower. Do not use towel bars as grab bars.  · Use non-skid mats or decals in the tub or shower.  · If you need to sit down in the shower, use a plastic, non-slip stool.  · Keep the floor dry. Clean up any water that spills on the floor as soon as it happens.  · Remove soap buildup in the tub or shower regularly.  · Attach bath mats securely with double-sided non-slip rug tape.  · Do not have throw rugs and other things on the floor that can make you trip.  What can I do in the bedroom?  · Use night lights.  · Make sure that you have a light by your bed that is easy to reach.  · Do not use any sheets or blankets that are too big for your bed. They should not hang down onto the floor.  · Have a firm chair that has side arms. You can use this for support while you get dressed.  · Do not have throw rugs and other things on the floor that can make you trip.  What can I do in the  kitchen?  · Clean up any spills right away.  · Avoid walking on wet floors.  · Keep items that you use a lot in easy-to-reach places.  · If you need to reach something above you, use a strong step stool that has a grab bar.  · Keep electrical cords out of the way.  · Do not use floor polish or wax that makes floors slippery. If you must use wax, use non-skid floor wax.  · Do not have throw rugs and other things on the floor that can make you trip.  What can I do with my stairs?  · Do not leave any items on the stairs.  · Make sure that there are handrails on both sides of the stairs and use them. Fix handrails that are broken or loose. Make sure that handrails are as long as the stairways.  · Check any carpeting to make sure that it is firmly attached to the stairs. Fix any carpet that is loose or worn.  · Avoid having throw rugs at the top or bottom of the stairs. If you do have throw rugs, attach them to the floor with carpet tape.  · Make sure that you have a light switch at the top of the stairs and the bottom of the stairs. If you do not have them, ask someone to add them for you.  What else can I do to help prevent falls?  · Wear shoes that:  ¨ Do not have high heels.  ¨ Have rubber bottoms.  ¨ Are comfortable and fit you well.  ¨ Are closed at the toe. Do not wear sandals.  · If you use a stepladder:  ¨ Make sure that it is fully opened. Do not climb a closed stepladder.  ¨ Make sure that both sides of the stepladder are locked into place.  ¨ Ask someone to hold it for you, if possible.  · Clearly greg and make sure that you can see:  ¨ Any grab bars or handrails.  ¨ First and last steps.  ¨ Where the edge of each step is.  · Use tools that help you move around (mobility aids) if they are needed. These include:  ¨ Canes.  ¨ Walkers.  ¨ Scooters.  ¨ Crutches.  · Turn on the lights when you go into a dark area. Replace any light bulbs as soon as they burn out.  · Set up your furniture so you have a clear path.  Avoid moving your furniture around.  · If any of your floors are uneven, fix them.  · If there are any pets around you, be aware of where they are.  · Review your medicines with your doctor. Some medicines can make you feel dizzy. This can increase your chance of falling.  Ask your doctor what other things that you can do to help prevent falls.  This information is not intended to replace advice given to you by your health care provider. Make sure you discuss any questions you have with your health care provider.  Document Released: 10/14/2010 Document Revised: 05/25/2017 Document Reviewed: 01/22/2016  Elsevier Interactive Patient Education © 2017 [employer]

## 2018-06-05 NOTE — PROGRESS NOTES
"Subjective   Lavinia Aguilar is a 73 y.o. female.     Chief Complaint   Patient presents with   • Follow-up     3 mo   mixed hyperlipidemia        History of Present Illness     she is toleraing crestor well witout myalgias      Current Outpatient Prescriptions:   •  Coenzyme Q-10 100 MG capsule, Take 100 mg by mouth., Disp: , Rfl:   •  estrogens, conjugated, (PREMARIN) 0.625 MG tablet, Take 1 tablet by mouth Daily. Take daily, Disp: 90 tablet, Rfl: 1  •  Multiple Vitamins-Minerals (MULTIVITAMIN ADULT PO), Take  by mouth., Disp: , Rfl:   •  rosuvastatin (CRESTOR) 10 MG tablet, Take 1 tablet by mouth Daily., Disp: 90 tablet, Rfl: 1  No Known Allergies    Past Medical History:   Diagnosis Date   • Bone/cartilage disorder    • Hyperlipidemia    • Left rotator cuff tear      Past Surgical History:   Procedure Laterality Date   • CARPAL TUNNEL RELEASE     • CERVICAL SPINE SURGERY     • COLONOSCOPY     • COLONOSCOPY  06/19/2013    internal hemorrhoid   • HAMMER TOE REPAIR     • HYSTERECTOMY     • INSERTION / REMOVAL CRANIAL DBS GENERATOR     • KNEE CARTILAGE SURGERY     • NASAL SEPTUM SURGERY     • RECTAL SURGERY      rectocele   • SHOULDER ROTATOR CUFF REPAIR Left 02/2018   • SHOULDER SURGERY Right    • TONSILLECTOMY     • TUBAL ABDOMINAL LIGATION         Review of Systems   Constitutional: Negative.    HENT: Negative.    Eyes: Negative.    Respiratory: Negative.    Cardiovascular: Negative.    Gastrointestinal: Negative.    Endocrine: Negative.    Genitourinary: Negative.    Musculoskeletal: Negative.    Skin: Negative.    Allergic/Immunologic: Negative.    Neurological: Negative.    Hematological: Negative.    Psychiatric/Behavioral: Negative.        Objective  /82   Pulse 88   Resp 16   Ht 160 cm (63\")   Wt 52.6 kg (116 lb)   SpO2 99%   BMI 20.55 kg/m²   Physical Exam   Constitutional: She is oriented to person, place, and time. She appears well-developed and well-nourished.   HENT:   Head: Normocephalic " and atraumatic.   Right Ear: External ear normal.   Left Ear: External ear normal.   Nose: Nose normal.   Mouth/Throat: Oropharynx is clear and moist.   Eyes: Conjunctivae and EOM are normal. Pupils are equal, round, and reactive to light.   Neck: Normal range of motion. Neck supple.   Cardiovascular: Normal rate, regular rhythm, normal heart sounds and intact distal pulses.    Pulmonary/Chest: Effort normal and breath sounds normal.   Abdominal: Soft. Bowel sounds are normal.   Musculoskeletal: Normal range of motion.   Neurological: She is alert and oriented to person, place, and time.   Skin: Skin is warm. Capillary refill takes less than 2 seconds.   Psychiatric: She has a normal mood and affect. Her behavior is normal. Judgment and thought content normal.   Vitals reviewed.      Assessment/Plan   Lavinia was seen today for follow-up.    Diagnoses and all orders for this visit:    Mixed hyperlipidemia  -     CBC w AUTO Differential  -     Comprehensive metabolic panel  -     Lipid Panel w/ Chol/HDL Ratio    Hormone replacement therapy      She is geging mammo and bone density this December  She is getting colon done this month           Orders Placed This Encounter   Procedures   • Comprehensive metabolic panel   • Lipid Panel w/ Chol/HDL Ratio   • CBC w AUTO Differential     Order Specific Question:   Manual Differential     Answer:   No       Follow up: 6 month(s)

## 2018-06-06 LAB
ALBUMIN SERPL-MCNC: 4 G/DL (ref 3.5–5)
ALBUMIN/GLOB SERPL: 1.4 G/DL (ref 1.1–2.5)
ALP SERPL-CCNC: 88 U/L (ref 24–120)
ALT SERPL-CCNC: 30 U/L (ref 0–54)
AST SERPL-CCNC: 33 U/L (ref 7–45)
BASOPHILS # BLD AUTO: 0.02 10*3/MM3 (ref 0–0.2)
BASOPHILS NFR BLD AUTO: 0.3 % (ref 0–2)
BILIRUB SERPL-MCNC: 0.4 MG/DL (ref 0.1–1)
BUN SERPL-MCNC: 15 MG/DL (ref 5–21)
BUN/CREAT SERPL: 23.8 (ref 7–25)
CALCIUM SERPL-MCNC: 9.1 MG/DL (ref 8.4–10.4)
CHLORIDE SERPL-SCNC: 100 MMOL/L (ref 98–110)
CHOLEST SERPL-MCNC: 192 MG/DL (ref 130–200)
CHOLEST/HDLC SERPL: 2.37 {RATIO}
CO2 SERPL-SCNC: 31 MMOL/L (ref 24–31)
CREAT SERPL-MCNC: 0.63 MG/DL (ref 0.5–1.4)
EOSINOPHIL # BLD AUTO: 0.05 10*3/MM3 (ref 0–0.7)
EOSINOPHIL NFR BLD AUTO: 0.8 % (ref 0–4)
ERYTHROCYTE [DISTWIDTH] IN BLOOD BY AUTOMATED COUNT: 13.2 % (ref 12–15)
GFR SERPLBLD CREATININE-BSD FMLA CKD-EPI: 112 ML/MIN/1.73
GFR SERPLBLD CREATININE-BSD FMLA CKD-EPI: 93 ML/MIN/1.73
GLOBULIN SER CALC-MCNC: 2.8 GM/DL
GLUCOSE SERPL-MCNC: 85 MG/DL (ref 70–100)
HCT VFR BLD AUTO: 43.1 % (ref 37–47)
HDLC SERPL-MCNC: 81 MG/DL
HGB BLD-MCNC: 13.8 G/DL (ref 12–16)
IMM GRANULOCYTES # BLD: 0.01 10*3/MM3 (ref 0–0.03)
IMM GRANULOCYTES NFR BLD: 0.2 % (ref 0–5)
LDLC SERPL CALC-MCNC: 93 MG/DL (ref 0–99)
LYMPHOCYTES # BLD AUTO: 1.59 10*3/MM3 (ref 0.72–4.86)
LYMPHOCYTES NFR BLD AUTO: 25.6 % (ref 15–45)
MCH RBC QN AUTO: 31.3 PG (ref 28–32)
MCHC RBC AUTO-ENTMCNC: 32 G/DL (ref 33–36)
MCV RBC AUTO: 97.7 FL (ref 82–98)
MONOCYTES # BLD AUTO: 0.4 10*3/MM3 (ref 0.19–1.3)
MONOCYTES NFR BLD AUTO: 6.4 % (ref 4–12)
NEUTROPHILS # BLD AUTO: 4.15 10*3/MM3 (ref 1.87–8.4)
NEUTROPHILS NFR BLD AUTO: 66.7 % (ref 39–78)
NRBC BLD AUTO-RTO: 0 /100 WBC (ref 0–0)
PLATELET # BLD AUTO: 193 10*3/MM3 (ref 130–400)
POTASSIUM SERPL-SCNC: 4.4 MMOL/L (ref 3.5–5.3)
PROT SERPL-MCNC: 6.8 G/DL (ref 6.3–8.7)
RBC # BLD AUTO: 4.41 10*6/MM3 (ref 4.2–5.4)
SODIUM SERPL-SCNC: 139 MMOL/L (ref 135–145)
TRIGL SERPL-MCNC: 90 MG/DL (ref 0–149)
VLDLC SERPL CALC-MCNC: 18 MG/DL
WBC # BLD AUTO: 6.22 10*3/MM3 (ref 4.8–10.8)

## 2018-06-27 ENCOUNTER — OFFICE VISIT (OUTPATIENT)
Dept: GASTROENTEROLOGY | Facility: CLINIC | Age: 73
End: 2018-06-27

## 2018-06-27 VITALS
HEART RATE: 87 BPM | SYSTOLIC BLOOD PRESSURE: 158 MMHG | DIASTOLIC BLOOD PRESSURE: 70 MMHG | BODY MASS INDEX: 20.91 KG/M2 | WEIGHT: 118 LBS | HEIGHT: 63 IN | TEMPERATURE: 98.3 F | OXYGEN SATURATION: 99 %

## 2018-06-27 DIAGNOSIS — Z83.71 FAMILY HX COLONIC POLYPS: Primary | ICD-10-CM

## 2018-06-27 PROBLEM — Z83.719 FAMILY HX COLONIC POLYPS: Status: ACTIVE | Noted: 2018-06-27

## 2018-06-27 PROCEDURE — S0260 H&P FOR SURGERY: HCPCS | Performed by: NURSE PRACTITIONER

## 2018-06-27 RX ORDER — POLYETHYLENE GLYCOL 3350, SODIUM CHLORIDE, SODIUM BICARBONATE, POTASSIUM CHLORIDE 420; 11.2; 5.72; 1.48 G/4L; G/4L; G/4L; G/4L
4000 POWDER, FOR SOLUTION ORAL SEE ADMIN INSTRUCTIONS
Qty: 4000 ML | Refills: 0 | Status: ON HOLD | OUTPATIENT
Start: 2018-06-27 | End: 2018-07-11

## 2018-06-27 NOTE — PROGRESS NOTES
[facility] G[facility]enterology    Primary Physician Praveen Leung MD    6/27/2018    Lavinia Aguilar   1945      Chief Complaint   Patient presents with   • Colonoscopy       Subjective     HPI    Lavinia Aguilar is a 73 y.o. female who presents as a referral for preventative maintenance. She has no complaints of nausea or vomiting. No change in bowels. No wt loss. No BRBPR. No melena. No abdominal pain.  Last colonoscopy was 6/2013 , internal hemorrhoid, recall rec 5 yr. .  The patient does not  have history of colon polyps. The patient does not have history of colon cancer.   There is family history of colon polyps mother in her 60's.  There is no family history of colon cancer.     Past Medical History:   Diagnosis Date   • Bone/cartilage disorder    • Hyperlipidemia    • Left rotator cuff tear        Past Surgical History:   Procedure Laterality Date   • CARPAL TUNNEL RELEASE     • CERVICAL SPINE SURGERY     • COLONOSCOPY     • COLONOSCOPY  06/19/2013    internal hemorrhoid   • HAMMER TOE REPAIR     • HYSTERECTOMY     • INSERTION / REMOVAL CRANIAL DBS GENERATOR     • KNEE CARTILAGE SURGERY     • NASAL SEPTUM SURGERY     • RECTAL SURGERY      rectocele   • SHOULDER ROTATOR CUFF REPAIR Left 02/2018   • SHOULDER SURGERY Right    • TONSILLECTOMY     • TUBAL ABDOMINAL LIGATION         Outpatient Prescriptions Marked as Taking for the 6/27/18 encounter (Office Visit) with ISACC Scruggs   Medication Sig Dispense Refill   • Coenzyme Q-10 100 MG capsule Take 100 mg by mouth.     • estrogens, conjugated, (PREMARIN) 0.625 MG tablet Take 1 tablet by mouth Daily. Take daily 90 tablet 1   • Multiple Vitamins-Minerals (MULTIVITAMIN ADULT PO) Take  by mouth.     • rosuvastatin (CRESTOR) 10 MG tablet Take 1 tablet by mouth Daily. 90 tablet 1       No Known Allergies    Social History     Social History   • Marital status:      Spouse name: N/A   • Number of children: N/A   • Years of education:  N/A     Occupational History   • Not on file.     Social History Main Topics   • Smoking status: Never Smoker   • Smokeless tobacco: Never Used   • Alcohol use No   • Drug use: No   • Sexual activity: Defer     Other Topics Concern   • Not on file     Social History Narrative   • No narrative on file       Family History   Problem Relation Age of Onset   • Colon polyps Mother    • Colon cancer Neg Hx        Review of Systems   Constitutional: Negative for appetite change, chills, fatigue, fever and unexpected weight change.   HENT: Negative for sore throat and trouble swallowing.    Eyes: Negative for visual disturbance.   Respiratory: Negative for cough, chest tightness, shortness of breath and wheezing.    Cardiovascular: Negative for chest pain and palpitations.   Gastrointestinal: Negative for abdominal distention, abdominal pain, anal bleeding, blood in stool, constipation, diarrhea, nausea, rectal pain and vomiting.        As mentioned in hpi   Genitourinary: Negative for difficulty urinating and hematuria.   Musculoskeletal: Negative for arthralgias and back pain.   Skin: Negative for color change and rash.   Neurological: Negative for dizziness, seizures, syncope, light-headedness and headaches.   Hematological: Negative for adenopathy.   Psychiatric/Behavioral: Negative for confusion. The patient is not nervous/anxious.        Objective     Vitals:    06/27/18 1253   BP: 158/70   Pulse: 87   Temp: 98.3 °F (36.8 °C)   SpO2: 99%     1    06/27/18  1253   Weight: 53.5 kg (118 lb)     Body mass index is 20.9 kg/m².    Physical Exam   Constitutional: She appears well-developed and well-nourished. No distress.   HENT:   Head: Normocephalic and atraumatic.   Eyes: EOM are normal. No scleral icterus.   Neck: Neck supple. No JVD present.   Cardiovascular: Normal rate, regular rhythm and normal heart sounds.    Pulmonary/Chest: Effort normal and breath sounds normal. No stridor.   Abdominal: Soft. Bowel sounds are  normal. She exhibits no distension and no mass. There is no tenderness. There is no rebound and no guarding.   Musculoskeletal: Normal range of motion. She exhibits no deformity.   Neurological: She is alert.   Skin: Skin is warm and dry. No rash noted.   Psychiatric: She has a normal mood and affect. Her behavior is normal.   Vitals reviewed.      Imaging Results (most recent)     None          Assessment/Plan     Lavinia was seen today for colonoscopy.    Diagnoses and all orders for this visit:    Family hx colonic polyps  -     Case Request; Standing  -     Case Request    Other orders  -     Instruct patient to be NPO.  -     Implement Anesthesia Orders Day of Procedure; Standing  -     Obtain Informed Consent; Standing  -     polyethylene glycol-electrolytes (NULYTELY WITH FLAVOR PACKS) 420 g solution; Take 4,000 mL by mouth See Admin Instructions.    plan for colonoscopy.          Body mass index is 20.9 kg/m².    Patient's Body mass index is 20.9 kg/m². BMI is within normal limits, no further follow up.       COLONOSCOPY WITH ANESTHESIA (N/A)  All risks, benefits, alternatives, and indications of colonoscopy procedure have been discussed with the patient. Risks to include perforation of the colon requiring possible surgery or colostomy, risk of bleeding from biopsies or removal of colon tissue, possibility of missing a colon polyp or cancer, or adverse drug reaction.  Benefits to include the diagnosis and management of disease of the colon and rectum. Alternatives to include barium enema, radiographic evaluation, lab testing or no intervention. Pt verbalizes understanding and agrees.       ISACC Bradley      EMR Dragon/transcription disclaimer:  Much of this encounter note is electronic transcription/translation of spoken language to printed text.  The electronic translation of spoken language may be erroneous, or at times, nonsensical words or phrases may be inadvertently transcribed.  Although I have  reviewed the note for such errors, some may still exist.

## 2018-06-29 ENCOUNTER — OFFICE VISIT (OUTPATIENT)
Dept: FAMILY MEDICINE CLINIC | Facility: CLINIC | Age: 73
End: 2018-06-29

## 2018-06-29 ENCOUNTER — TELEPHONE (OUTPATIENT)
Dept: FAMILY MEDICINE CLINIC | Facility: CLINIC | Age: 73
End: 2018-06-29

## 2018-06-29 VITALS
BODY MASS INDEX: 21.09 KG/M2 | HEART RATE: 90 BPM | DIASTOLIC BLOOD PRESSURE: 84 MMHG | WEIGHT: 119 LBS | RESPIRATION RATE: 16 BRPM | HEIGHT: 63 IN | SYSTOLIC BLOOD PRESSURE: 126 MMHG | OXYGEN SATURATION: 96 %

## 2018-06-29 DIAGNOSIS — B02.9 HERPES ZOSTER WITHOUT COMPLICATION: Primary | ICD-10-CM

## 2018-06-29 PROCEDURE — 99213 OFFICE O/P EST LOW 20 MIN: CPT | Performed by: FAMILY MEDICINE

## 2018-06-29 RX ORDER — ACYCLOVIR 800 MG/1
800 TABLET ORAL 4 TIMES DAILY
Qty: 28 TABLET | Refills: 0 | Status: SHIPPED | OUTPATIENT
Start: 2018-06-29 | End: 2018-06-29 | Stop reason: SDUPTHER

## 2018-06-29 RX ORDER — ACYCLOVIR 800 MG/1
800 TABLET ORAL 4 TIMES DAILY
Qty: 28 TABLET | Refills: 0 | Status: SHIPPED | OUTPATIENT
Start: 2018-06-29 | End: 2018-12-04

## 2018-06-29 NOTE — PROGRESS NOTES
Subjective   Lavinia Aguilar is a 73 y.o. female.     Chief Complaint   Patient presents with   • Rash     right flank with knot under the red spots.   pt is concerned that it may be shingles    x 2 days        History of Present Illness     notd vesicular rash right flank and a adomen      Current Outpatient Prescriptions:   •  acyclovir (ZOVIRAX) 800 MG tablet, Take 1 tablet by mouth 4 (Four) Times a Day., Disp: 28 tablet, Rfl: 0  •  Coenzyme Q-10 100 MG capsule, Take 100 mg by mouth., Disp: , Rfl:   •  estrogens, conjugated, (PREMARIN) 0.625 MG tablet, Take 1 tablet by mouth Daily. Take daily, Disp: 90 tablet, Rfl: 1  •  Multiple Vitamins-Minerals (MULTIVITAMIN ADULT PO), Take  by mouth., Disp: , Rfl:   •  polyethylene glycol-electrolytes (NULYTELY WITH FLAVOR PACKS) 420 g solution, Take 4,000 mL by mouth See Admin Instructions., Disp: 4000 mL, Rfl: 0  •  rosuvastatin (CRESTOR) 10 MG tablet, Take 1 tablet by mouth Daily., Disp: 90 tablet, Rfl: 1  No Known Allergies    Past Medical History:   Diagnosis Date   • Bone/cartilage disorder    • Hyperlipidemia    • Left rotator cuff tear      Past Surgical History:   Procedure Laterality Date   • CARPAL TUNNEL RELEASE     • CERVICAL SPINE SURGERY     • COLONOSCOPY     • COLONOSCOPY  06/19/2013    internal hemorrhoid   • HAMMER TOE REPAIR     • HYSTERECTOMY     • INSERTION / REMOVAL CRANIAL DBS GENERATOR     • KNEE CARTILAGE SURGERY     • NASAL SEPTUM SURGERY     • RECTAL SURGERY      rectocele   • SHOULDER ROTATOR CUFF REPAIR Left 02/2018   • SHOULDER SURGERY Right    • TONSILLECTOMY     • TUBAL ABDOMINAL LIGATION         Review of Systems   Constitutional: Negative.    HENT: Negative.    Eyes: Negative.    Respiratory: Negative.    Cardiovascular: Negative.    Gastrointestinal: Negative.    Endocrine: Negative.    Genitourinary: Negative.    Musculoskeletal: Negative.    Skin: Negative.    Allergic/Immunologic: Negative.    Neurological: Negative.    Hematological:  "Negative.    Psychiatric/Behavioral: Negative.        Objective  /84   Pulse 90   Resp 16   Ht 160 cm (63\")   Wt 54 kg (119 lb)   SpO2 96%   BMI 21.08 kg/m²   Physical Exam   Constitutional: She is oriented to person, place, and time. She appears well-developed and well-nourished.   HENT:   Head: Normocephalic and atraumatic.   Eyes: EOM are normal. Pupils are equal, round, and reactive to light.   Neck: Normal range of motion. Neck supple.   Cardiovascular: Normal rate, regular rhythm and normal heart sounds.    Pulmonary/Chest: Effort normal and breath sounds normal.   Abdominal: Soft. Bowel sounds are normal.   Musculoskeletal: Normal range of motion.   Neurological: She is alert and oriented to person, place, and time.   Skin: Skin is warm. Capillary refill takes less than 2 seconds. Rash (rightr flank vesicular rash) noted.   Psychiatric: She has a normal mood and affect. Her behavior is normal. Judgment normal.   Vitals reviewed.      Assessment/Plan   Lavinia was seen today for rash.    Diagnoses and all orders for this visit:    Herpes zoster without complication    Other orders  -     Discontinue: acyclovir (ZOVIRAX) 800 MG tablet; Take 1 tablet by mouth 4 (Four) Times a Day.  -     acyclovir (ZOVIRAX) 800 MG tablet; Take 1 tablet by mouth 4 (Four) Times a Day.      k    Keep me informed       No orders of the defined types were placed in this encounter.      Follow up: rpn  "

## 2018-06-29 NOTE — TELEPHONE ENCOUNTER
Had been working in her roses. Now she has bumps on her side. One large one and 2 small ones. Doesn't hurt. She is putting hydrocortisone on it to keep from itching. Doesn't know if its bug bites or shingles. Wanted to know it needs looked at?

## 2018-07-11 ENCOUNTER — ANESTHESIA EVENT (OUTPATIENT)
Dept: GASTROENTEROLOGY | Facility: HOSPITAL | Age: 73
End: 2018-07-11

## 2018-07-11 ENCOUNTER — HOSPITAL ENCOUNTER (OUTPATIENT)
Facility: HOSPITAL | Age: 73
Setting detail: HOSPITAL OUTPATIENT SURGERY
Discharge: HOME | End: 2018-07-11
Attending: INTERNAL MEDICINE | Admitting: NURSE ANESTHETIST, CERTIFIED REGISTERED

## 2018-07-11 ENCOUNTER — ANESTHESIA (OUTPATIENT)
Dept: GASTROENTEROLOGY | Facility: HOSPITAL | Age: 73
End: 2018-07-11

## 2018-07-11 VITALS
TEMPERATURE: 97.4 F | SYSTOLIC BLOOD PRESSURE: 125 MMHG | OXYGEN SATURATION: 99 % | WEIGHT: 117 LBS | RESPIRATION RATE: 18 BRPM | HEIGHT: 63 IN | BODY MASS INDEX: 20.73 KG/M2 | HEART RATE: 80 BPM | DIASTOLIC BLOOD PRESSURE: 69 MMHG

## 2018-07-11 PROCEDURE — 25010000002 PROPOFOL 10 MG/ML EMULSION: Performed by: NURSE ANESTHETIST, CERTIFIED REGISTERED

## 2018-07-11 PROCEDURE — G0105 COLORECTAL SCRN; HI RISK IND: HCPCS | Performed by: INTERNAL MEDICINE

## 2018-07-11 RX ORDER — ONDANSETRON 2 MG/ML
4 INJECTION INTRAMUSCULAR; INTRAVENOUS ONCE AS NEEDED
Status: DISCONTINUED | OUTPATIENT
Start: 2018-07-11 | End: 2018-07-11 | Stop reason: HOSPADM

## 2018-07-11 RX ORDER — SODIUM CHLORIDE 0.9 % (FLUSH) 0.9 %
3 SYRINGE (ML) INJECTION AS NEEDED
Status: DISCONTINUED | OUTPATIENT
Start: 2018-07-11 | End: 2018-07-11 | Stop reason: HOSPADM

## 2018-07-11 RX ORDER — PROPOFOL 10 MG/ML
VIAL (ML) INTRAVENOUS AS NEEDED
Status: DISCONTINUED | OUTPATIENT
Start: 2018-07-11 | End: 2018-07-11 | Stop reason: SURG

## 2018-07-11 RX ORDER — LIDOCAINE HYDROCHLORIDE 20 MG/ML
INJECTION, SOLUTION INFILTRATION; PERINEURAL AS NEEDED
Status: DISCONTINUED | OUTPATIENT
Start: 2018-07-11 | End: 2018-07-11 | Stop reason: SURG

## 2018-07-11 RX ORDER — SODIUM CHLORIDE 9 MG/ML
500 INJECTION, SOLUTION INTRAVENOUS CONTINUOUS PRN
Status: DISCONTINUED | OUTPATIENT
Start: 2018-07-11 | End: 2018-07-11 | Stop reason: HOSPADM

## 2018-07-11 RX ADMIN — SODIUM CHLORIDE 500 ML: 9 INJECTION, SOLUTION INTRAVENOUS at 07:05

## 2018-07-11 RX ADMIN — LIDOCAINE HYDROCHLORIDE 0.5 ML: 10 INJECTION, SOLUTION EPIDURAL; INFILTRATION; INTRACAUDAL; PERINEURAL at 07:05

## 2018-07-11 RX ADMIN — PROPOFOL 200 MG: 10 INJECTION, EMULSION INTRAVENOUS at 08:52

## 2018-07-11 RX ADMIN — LIDOCAINE HYDROCHLORIDE 100 MG: 20 INJECTION, SOLUTION INFILTRATION; PERINEURAL at 08:52

## 2018-07-11 NOTE — H&P (VIEW-ONLY)
[facility]    Primary Physician Praveen Leung MD    6/27/2018    Lavinia Aguilar   1945      Chief Complaint   Patient presents with   • Colonoscopy       Subjective     HPI    Lavinia Aguilar is a 73 y.o. female who presents as a referral for preventative maintenance. She has no complaints of nausea or vomiting. No change in bowels. No wt loss. No BRBPR. No melena. No abdominal pain.  Last colonoscopy was 6/2013 , internal hemorrhoid, recall rec 5 yr. .  The patient does not  have history of colon polyps. The patient does not have history of colon cancer.   There is family history of colon polyps mother in her 60's.  There is no family history of colon cancer.     Past Medical History:   Diagnosis Date   • Bone/cartilage disorder    • Hyperlipidemia    • Left rotator cuff tear        Past Surgical History:   Procedure Laterality Date   • CARPAL TUNNEL RELEASE     • CERVICAL SPINE SURGERY     • COLONOSCOPY     • COLONOSCOPY  06/19/2013    internal hemorrhoid   • HAMMER TOE REPAIR     • HYSTERECTOMY     • INSERTION / REMOVAL CRANIAL DBS GENERATOR     • KNEE CARTILAGE SURGERY     • NASAL SEPTUM SURGERY     • RECTAL SURGERY      rectocele   • SHOULDER ROTATOR CUFF REPAIR Left 02/2018   • SHOULDER SURGERY Right    • TONSILLECTOMY     • TUBAL ABDOMINAL LIGATION         Outpatient Prescriptions Marked as Taking for the 6/27/18 encounter (Office Visit) with ISACC Scruggs   Medication Sig Dispense Refill   • Coenzyme Q-10 100 MG capsule Take 100 mg by mouth.     • estrogens, conjugated, (PREMARIN) 0.625 MG tablet Take 1 tablet by mouth Daily. Take daily 90 tablet 1   • Multiple Vitamins-Minerals (MULTIVITAMIN ADULT PO) Take  by mouth.     • rosuvastatin (CRESTOR) 10 MG tablet Take 1 tablet by mouth Daily. 90 tablet 1       No Known Allergies    Social History     Social History   • Marital status:      Spouse name: N/A   • Number of children: N/A   • Years of education:  N/A     Occupational History   • Not on file.     Social History Main Topics   • Smoking status: Never Smoker   • Smokeless tobacco: Never Used   • Alcohol use No   • Drug use: No   • Sexual activity: Defer     Other Topics Concern   • Not on file     Social History Narrative   • No narrative on file       Family History   Problem Relation Age of Onset   • Colon polyps Mother    • Colon cancer Neg Hx        Review of Systems   Constitutional: Negative for appetite change, chills, fatigue, fever and unexpected weight change.   HENT: Negative for sore throat and trouble swallowing.    Eyes: Negative for visual disturbance.   Respiratory: Negative for cough, chest tightness, shortness of breath and wheezing.    Cardiovascular: Negative for chest pain and palpitations.   Gastrointestinal: Negative for abdominal distention, abdominal pain, anal bleeding, blood in stool, constipation, diarrhea, nausea, rectal pain and vomiting.        As mentioned in hpi   Genitourinary: Negative for difficulty urinating and hematuria.   Musculoskeletal: Negative for arthralgias and back pain.   Skin: Negative for color change and rash.   Neurological: Negative for dizziness, seizures, syncope, light-headedness and headaches.   Hematological: Negative for adenopathy.   Psychiatric/Behavioral: Negative for confusion. The patient is not nervous/anxious.        Objective     Vitals:    06/27/18 1253   BP: 158/70   Pulse: 87   Temp: 98.3 °F (36.8 °C)   SpO2: 99%     1    06/27/18  1253   Weight: 53.5 kg (118 lb)     Body mass index is 20.9 kg/m².    Physical Exam   Constitutional: She appears well-developed and well-nourished. No distress.   HENT:   Head: Normocephalic and atraumatic.   Eyes: EOM are normal. No scleral icterus.   Neck: Neck supple. No JVD present.   Cardiovascular: Normal rate, regular rhythm and normal heart sounds.    Pulmonary/Chest: Effort normal and breath sounds normal. No stridor.   Abdominal: Soft. Bowel sounds are  normal. She exhibits no distension and no mass. There is no tenderness. There is no rebound and no guarding.   Musculoskeletal: Normal range of motion. She exhibits no deformity.   Neurological: She is alert.   Skin: Skin is warm and dry. No rash noted.   Psychiatric: She has a normal mood and affect. Her behavior is normal.   Vitals reviewed.      Imaging Results (most recent)     None          Assessment/Plan     Lavinia was seen today for colonoscopy.    Diagnoses and all orders for this visit:    Family hx colonic polyps  -     Case Request; Standing  -     Case Request    Other orders  -     Instruct patient to be NPO.  -     Implement Anesthesia Orders Day of Procedure; Standing  -     Obtain Informed Consent; Standing  -     polyethylene glycol-electrolytes (NULYTELY WITH FLAVOR PACKS) 420 g solution; Take 4,000 mL by mouth See Admin Instructions.    plan for colonoscopy.          Body mass index is 20.9 kg/m².    Patient's Body mass index is 20.9 kg/m². BMI is within normal limits, no further follow up.       COLONOSCOPY WITH ANESTHESIA (N/A)  All risks, benefits, alternatives, and indications of colonoscopy procedure have been discussed with the patient. Risks to include perforation of the colon requiring possible surgery or colostomy, risk of bleeding from biopsies or removal of colon tissue, possibility of missing a colon polyp or cancer, or adverse drug reaction.  Benefits to include the diagnosis and management of disease of the colon and rectum. Alternatives to include barium enema, radiographic evaluation, lab testing or no intervention. Pt verbalizes understanding and agrees.       ISACC Bradley      EMR Dragon/transcription disclaimer:  Much of this encounter note is electronic transcription/translation of spoken language to printed text.  The electronic translation of spoken language may be erroneous, or at times, nonsensical words or phrases may be inadvertently transcribed.  Although I have  reviewed the note for such errors, some may still exist.

## 2018-07-11 NOTE — ANESTHESIA POSTPROCEDURE EVALUATION
"Patient: Lavinia Aguilar    Procedure Summary     Date:  07/11/18 Room / Location:  [facility] ENDOSCOPY 5 / [facility]    Anesthesia Start:  0839 Anesthesia Stop:  0916    Procedure:  COLONOSCOPY WITH ANESTHESIA (N/A ) Diagnosis:       Family hx colonic polyps      (Family hx colonic polyps [Z83.71])    Surgeon:  Andrew Palm MD Provider:  Joel Castillo CRNA    Anesthesia Type:  general ASA Status:  2          Anesthesia Type: general  Last vitals  BP   107/68 (07/11/18 0915)   Temp   97.4 °F (36.3 °C) (07/11/18 0649)   Pulse   81 (07/11/18 0915)   Resp   14 (07/11/18 0915)     SpO2   97 % (07/11/18 0915)     Post Anesthesia Care and Evaluation    Patient location during evaluation: PHASE II  Patient participation: complete - patient participated  Level of consciousness: awake and alert  Pain management: adequate  Airway patency: patent  Anesthetic complications: No anesthetic complications    Cardiovascular status: acceptable  Respiratory status: acceptable  Hydration status: acceptable    Comments: Blood pressure 107/68, pulse 81, temperature 97.4 °F (36.3 °C), temperature source Temporal Artery , resp. rate 14, height 160 cm (63\"), weight 53.1 kg (117 lb), SpO2 97 %.    Pt discharged from PACU based on kita score >8      "

## 2018-11-09 ENCOUNTER — TELEPHONE (OUTPATIENT)
Dept: FAMILY MEDICINE CLINIC | Facility: CLINIC | Age: 73
End: 2018-11-09

## 2018-11-09 NOTE — TELEPHONE ENCOUNTER
fyi pt called said that u wanted to know when she was to getg her mammo and bone density done she is getting both on 12/12/18 at 330 and 345

## 2018-12-04 ENCOUNTER — OFFICE VISIT (OUTPATIENT)
Dept: FAMILY MEDICINE CLINIC | Facility: CLINIC | Age: 73
End: 2018-12-04

## 2018-12-04 VITALS
OXYGEN SATURATION: 97 % | DIASTOLIC BLOOD PRESSURE: 82 MMHG | HEIGHT: 63 IN | WEIGHT: 116 LBS | RESPIRATION RATE: 16 BRPM | HEART RATE: 102 BPM | BODY MASS INDEX: 20.55 KG/M2 | SYSTOLIC BLOOD PRESSURE: 138 MMHG

## 2018-12-04 DIAGNOSIS — Z79.890 HORMONE REPLACEMENT THERAPY: ICD-10-CM

## 2018-12-04 DIAGNOSIS — E78.2 MIXED HYPERLIPIDEMIA: Primary | ICD-10-CM

## 2018-12-04 DIAGNOSIS — Z23 NEED FOR IMMUNIZATION AGAINST INFLUENZA: ICD-10-CM

## 2018-12-04 PROCEDURE — G0008 ADMIN INFLUENZA VIRUS VAC: HCPCS | Performed by: FAMILY MEDICINE

## 2018-12-04 PROCEDURE — 90662 IIV NO PRSV INCREASED AG IM: CPT | Performed by: FAMILY MEDICINE

## 2018-12-04 PROCEDURE — 99213 OFFICE O/P EST LOW 20 MIN: CPT | Performed by: FAMILY MEDICINE

## 2018-12-04 RX ORDER — ROSUVASTATIN CALCIUM 10 MG/1
10 TABLET, COATED ORAL DAILY
Qty: 90 TABLET | Refills: 1 | Status: SHIPPED | OUTPATIENT
Start: 2018-12-04 | End: 2019-06-04 | Stop reason: SDUPTHER

## 2018-12-04 NOTE — PROGRESS NOTES
"Subjective   Lavinia Aguilar is a 73 y.o. female.     Chief Complaint   Patient presents with   • Follow-up     6 mo   hyperlipidemia        History of Present Illness     she denies any hot flashes with hrt--she is tolerating statin witout myalgias..      Current Outpatient Medications:   •  Coenzyme Q-10 100 MG capsule, Take 100 mg by mouth., Disp: , Rfl:   •  estrogens, conjugated, (PREMARIN) 0.625 MG tablet, Take 1 tablet by mouth Daily. Take daily, Disp: 90 tablet, Rfl: 1  •  Multiple Vitamins-Minerals (MULTIVITAMIN ADULT PO), Take  by mouth., Disp: , Rfl:   •  rosuvastatin (CRESTOR) 10 MG tablet, Take 1 tablet by mouth Daily., Disp: 90 tablet, Rfl: 1  No Known Allergies    Past Medical History:   Diagnosis Date   • Bone/cartilage disorder    • Hyperlipidemia    • Left rotator cuff tear    • PONV (postoperative nausea and vomiting)      Past Surgical History:   Procedure Laterality Date   • CARPAL TUNNEL RELEASE     • CERVICAL SPINE SURGERY     • COLONOSCOPY     • COLONOSCOPY  06/19/2013    internal hemorrhoid   • HAMMER TOE REPAIR     • HYSTERECTOMY     • INSERTION / REMOVAL CRANIAL DBS GENERATOR     • KNEE CARTILAGE SURGERY     • NASAL SEPTUM SURGERY     • RECTAL SURGERY      rectocele   • SHOULDER ROTATOR CUFF REPAIR Left 02/2018   • SHOULDER SURGERY Right    • TONSILLECTOMY     • TUBAL ABDOMINAL LIGATION         Review of Systems   Constitutional: Negative.    HENT: Negative.    Eyes: Negative.    Respiratory: Negative.    Cardiovascular: Negative.    Gastrointestinal: Negative.    Endocrine: Negative.    Genitourinary: Negative.    Musculoskeletal: Negative.    Skin: Negative.    Allergic/Immunologic: Negative.    Neurological: Negative.    Hematological: Negative.    Psychiatric/Behavioral: Negative.        Objective  /82   Pulse 102   Resp 16   Ht 160 cm (63\")   Wt 52.6 kg (116 lb)   SpO2 97%   BMI 20.55 kg/m²   Physical Exam   Constitutional: She is oriented to person, place, and time. She " appears well-developed and well-nourished.   HENT:   Head: Normocephalic and atraumatic.   Right Ear: External ear normal.   Left Ear: External ear normal.   Nose: Nose normal.   Mouth/Throat: Oropharynx is clear and moist.   Eyes: Conjunctivae and EOM are normal. Pupils are equal, round, and reactive to light.   Neck: Normal range of motion. Neck supple.   Cardiovascular: Normal rate, regular rhythm, normal heart sounds and intact distal pulses.   Pulmonary/Chest: Effort normal and breath sounds normal.   Abdominal: Soft. Bowel sounds are normal.   Musculoskeletal: Normal range of motion.   Neurological: She is alert and oriented to person, place, and time.   Skin: Skin is warm. Capillary refill takes less than 2 seconds.   Psychiatric: She has a normal mood and affect. Her behavior is normal. Judgment and thought content normal.   Nursing note and vitals reviewed.      Assessment/Plan   Lavinia was seen today for follow-up.    Diagnoses and all orders for this visit:    Mixed hyperlipidemia  -     CBC w AUTO Differential  -     Comprehensive metabolic panel  -     Lipid Panel With / Chol / HDL Ratio    Hormone replacement therapy                  Orders Placed This Encounter   Procedures   • Comprehensive metabolic panel   • Lipid Panel With / Chol / HDL Ratio   • CBC w AUTO Differential     Order Specific Question:   Manual Differential     Answer:   No       Follow up: 6 month(s)

## 2018-12-05 LAB
ALBUMIN SERPL-MCNC: 4.1 G/DL (ref 3.5–5)
ALBUMIN/GLOB SERPL: 1.5 G/DL (ref 1.1–2.5)
ALP SERPL-CCNC: 69 U/L (ref 24–120)
ALT SERPL-CCNC: 28 U/L (ref 0–54)
AST SERPL-CCNC: 30 U/L (ref 7–45)
BASOPHILS # BLD AUTO: 0.01 10*3/MM3 (ref 0–0.2)
BASOPHILS NFR BLD AUTO: 0.2 % (ref 0–2)
BILIRUB SERPL-MCNC: 0.5 MG/DL (ref 0.1–1)
BUN SERPL-MCNC: 13 MG/DL (ref 5–21)
BUN/CREAT SERPL: 19.4 (ref 7–25)
CALCIUM SERPL-MCNC: 9 MG/DL (ref 8.4–10.4)
CHLORIDE SERPL-SCNC: 100 MMOL/L (ref 98–110)
CHOLEST SERPL-MCNC: 197 MG/DL (ref 130–200)
CHOLEST/HDLC SERPL: 2.29 {RATIO}
CO2 SERPL-SCNC: 31 MMOL/L (ref 24–31)
CREAT SERPL-MCNC: 0.67 MG/DL (ref 0.5–1.4)
EOSINOPHIL # BLD AUTO: 0.09 10*3/MM3 (ref 0–0.7)
EOSINOPHIL NFR BLD AUTO: 1.7 % (ref 0–4)
ERYTHROCYTE [DISTWIDTH] IN BLOOD BY AUTOMATED COUNT: 12.4 % (ref 12–15)
GLOBULIN SER CALC-MCNC: 2.8 GM/DL
GLUCOSE SERPL-MCNC: 92 MG/DL (ref 70–100)
HCT VFR BLD AUTO: 43.2 % (ref 37–47)
HDLC SERPL-MCNC: 86 MG/DL
HGB BLD-MCNC: 14.1 G/DL (ref 12–16)
IMM GRANULOCYTES # BLD: 0.01 10*3/MM3 (ref 0–0.03)
IMM GRANULOCYTES NFR BLD: 0.2 % (ref 0–5)
LDLC SERPL CALC-MCNC: 91 MG/DL (ref 0–99)
LYMPHOCYTES # BLD AUTO: 1.49 10*3/MM3 (ref 0.72–4.86)
LYMPHOCYTES NFR BLD AUTO: 28.7 % (ref 15–45)
MCH RBC QN AUTO: 32 PG (ref 28–32)
MCHC RBC AUTO-ENTMCNC: 32.6 G/DL (ref 33–36)
MCV RBC AUTO: 98 FL (ref 82–98)
MONOCYTES # BLD AUTO: 0.39 10*3/MM3 (ref 0.19–1.3)
MONOCYTES NFR BLD AUTO: 7.5 % (ref 4–12)
NEUTROPHILS # BLD AUTO: 3.2 10*3/MM3 (ref 1.87–8.4)
NEUTROPHILS NFR BLD AUTO: 61.7 % (ref 39–78)
NRBC BLD AUTO-RTO: 0 /100 WBC (ref 0–0)
PLATELET # BLD AUTO: 207 10*3/MM3 (ref 130–400)
POTASSIUM SERPL-SCNC: 4.4 MMOL/L (ref 3.5–5.3)
PROT SERPL-MCNC: 6.9 G/DL (ref 6.3–8.7)
RBC # BLD AUTO: 4.41 10*6/MM3 (ref 4.2–5.4)
SODIUM SERPL-SCNC: 140 MMOL/L (ref 135–145)
TRIGL SERPL-MCNC: 102 MG/DL (ref 0–149)
VLDLC SERPL CALC-MCNC: 20.4 MG/DL
WBC # BLD AUTO: 5.19 10*3/MM3 (ref 4.8–10.8)

## 2018-12-11 DIAGNOSIS — M81.0 OSTEOPOROSIS, UNSPECIFIED OSTEOPOROSIS TYPE, UNSPECIFIED PATHOLOGICAL FRACTURE PRESENCE: ICD-10-CM

## 2018-12-11 DIAGNOSIS — Z12.39 BREAST CANCER SCREENING: Primary | ICD-10-CM

## 2018-12-13 DIAGNOSIS — Z12.39 BREAST CANCER SCREENING: ICD-10-CM

## 2019-01-03 DIAGNOSIS — M81.0 OSTEOPOROSIS, UNSPECIFIED OSTEOPOROSIS TYPE, UNSPECIFIED PATHOLOGICAL FRACTURE PRESENCE: ICD-10-CM

## 2019-01-04 NOTE — PROGRESS NOTES
Pt informed she has Osteopenia. Recommended she take Ca/Vit D 1200 IU. Also needs her Vit D checked. She will come in today. She said she was on the Fosamax several years ago and it seem to work. Wants to see about taking it again after the Vit D results come back.

## 2019-01-07 DIAGNOSIS — M85.80 OSTEOPENIA, UNSPECIFIED LOCATION: Primary | ICD-10-CM

## 2019-01-08 LAB — 25(OH)D3+25(OH)D2 SERPL-MCNC: 53.3 NG/ML (ref 30–100)

## 2019-01-15 ENCOUNTER — TELEPHONE (OUTPATIENT)
Dept: FAMILY MEDICINE CLINIC | Facility: CLINIC | Age: 74
End: 2019-01-15

## 2019-01-15 RX ORDER — ALENDRONATE SODIUM 35 MG/1
35 TABLET ORAL
Qty: 12 TABLET | Refills: 3 | Status: SHIPPED | OUTPATIENT
Start: 2019-01-15 | End: 2019-06-04

## 2019-01-15 NOTE — TELEPHONE ENCOUNTER
Lavinia called & said that she has been dx w/ osteopenia in the past and taken fosamax.  She said that her GYN that gave her the med is retired and she is req that you take over the RX.  Is this ok?  [phone]

## 2019-03-11 NOTE — PROGRESS NOTES
Occupational Therapy   Occupational Therapy Initial Assessment  Date: 2018   Patient Name: Nam Fernandez  MRN: [id number]     : 1945    Patient Diagnosis(es): There were no encounter diagnoses. has a past medical history of Hyperlipidemia and Tremors of nervous system. has a past surgical history that includes Tonsillectomy; Tubal ligation; tumor removal (Left); Hysterectomy; Carpal tunnel release (Bilateral); back surgery; Hammer toe surgery (Bilateral); Colonoscopy; eye surgery (Bilateral); Rotator cuff repair (Right); Deep brain stimulator placement; and reconstr total shoulder implant (Left, 2018). Treatment Diagnosis: L reverse total shoulder replacement      Restrictions  Restrictions/Precautions  Restrictions/Precautions: ROM Restrictions (LUE:  NWB, no A/PROM in shld flex and AB, no ER past neutral, no shld extension)    Subjective   General  Chart Reviewed: Yes  Patient assessed for rehabilitation services?: Yes  Family / Caregiver Present: Yes  Diagnosis: L reverse total shoulder replacement  Pain Assessment  Patient Currently in Pain: Yes  Pain Assessment: Faces  Rivera-Baker Pain Rating: Hurts a little bit  Pain Level: 0  Pain Type: Surgical pain  Pain Location: Shoulder  Pain Orientation: Left  Pain Descriptors: Aching  Pain Intervention(s): Medication (see eMar)     Social/Functional History  Social/Functional History  Lives With: Spouse  ADL Assistance: Independent  Ambulation Assistance: Independent  Transfer Assistance: Independent  Additional Comments: Spouse will be able to assist with ADLs as needed       Objective   Vision: Within Functional Limits  Hearing: Within functional limits          Standing Balance  Stand to sit: Independent  ADL  Feeding: Setup  Grooming: Minimal assistance  UE Bathing: Minimal assistance  LE Bathing: Supervision  UE Dressing:  Moderate assistance  LE Dressing: Supervision  Toileting: Supervision  Additional Comments: Can only use R hand for - lovenox 40 QD    Advanced care planning: pt was intubated and had a cardiac arrest back in june 2018 per wife- pt wants to remain full code.

## 2019-04-15 ENCOUNTER — TELEPHONE (OUTPATIENT)
Dept: FAMILY MEDICINE CLINIC | Facility: CLINIC | Age: 74
End: 2019-04-15

## 2019-04-15 RX ORDER — CIPROFLOXACIN 500 MG/1
500 TABLET, FILM COATED ORAL 2 TIMES DAILY
Qty: 14 TABLET | Refills: 0 | Status: SHIPPED | OUTPATIENT
Start: 2019-04-15 | End: 2019-06-04

## 2019-04-15 NOTE — TELEPHONE ENCOUNTER
She thinks she has a UTI. Has the burning and frequency on urination. She says Cipro works good for her. Would like to get an RX for it to [facility]

## 2019-06-04 ENCOUNTER — OFFICE VISIT (OUTPATIENT)
Dept: FAMILY MEDICINE CLINIC | Facility: CLINIC | Age: 74
End: 2019-06-04

## 2019-06-04 VITALS
BODY MASS INDEX: 20.38 KG/M2 | OXYGEN SATURATION: 97 % | DIASTOLIC BLOOD PRESSURE: 78 MMHG | SYSTOLIC BLOOD PRESSURE: 120 MMHG | RESPIRATION RATE: 16 BRPM | WEIGHT: 115 LBS | HEART RATE: 66 BPM | HEIGHT: 63 IN

## 2019-06-04 DIAGNOSIS — E78.2 MIXED HYPERLIPIDEMIA: Primary | ICD-10-CM

## 2019-06-04 PROCEDURE — 99213 OFFICE O/P EST LOW 20 MIN: CPT | Performed by: FAMILY MEDICINE

## 2019-06-04 RX ORDER — ROSUVASTATIN CALCIUM 10 MG/1
10 TABLET, COATED ORAL DAILY
Qty: 90 TABLET | Refills: 1 | Status: SHIPPED | OUTPATIENT
Start: 2019-06-04 | End: 2019-12-04 | Stop reason: SDUPTHER

## 2019-06-04 NOTE — PROGRESS NOTES
"Subjective   Lavinia Aguilar is a 74 y.o. female.     Chief Complaint   Patient presents with   • Follow-up     6 mo   hyperlipidemia        History of Present Illness     she is tolerating crestor without myalgias      Current Outpatient Medications:   •  Coenzyme Q-10 100 MG capsule, Take 100 mg by mouth., Disp: , Rfl:   •  estrogens, conjugated, (PREMARIN) 0.625 MG tablet, Take 1 tablet by mouth Daily. Take daily, Disp: 90 tablet, Rfl: 1  •  Multiple Vitamins-Minerals (MULTIVITAMIN ADULT PO), Take  by mouth., Disp: , Rfl:   •  rosuvastatin (CRESTOR) 10 MG tablet, Take 1 tablet by mouth Daily., Disp: 90 tablet, Rfl: 1  No Known Allergies    Past Medical History:   Diagnosis Date   • Bone/cartilage disorder    • Hyperlipidemia    • Left rotator cuff tear    • PONV (postoperative nausea and vomiting)      Past Surgical History:   Procedure Laterality Date   • CARPAL TUNNEL RELEASE     • CERVICAL SPINE SURGERY     • COLONOSCOPY     • COLONOSCOPY  06/19/2013    internal hemorrhoid   • COLONOSCOPY N/A 7/11/2018    Procedure: COLONOSCOPY WITH ANESTHESIA;  Surgeon: Andrew Palm MD;  Location: [facility] ENDOSCOPY;  Service: Gastroenterology   • HAMMER TOE REPAIR     • HYSTERECTOMY     • INSERTION / REMOVAL CRANIAL DBS GENERATOR     • KNEE CARTILAGE SURGERY     • NASAL SEPTUM SURGERY     • RECTAL SURGERY      rectocele   • SHOULDER ROTATOR CUFF REPAIR Left 02/2018   • SHOULDER SURGERY Right    • TONSILLECTOMY     • TUBAL ABDOMINAL LIGATION         Review of Systems   Constitutional: Negative.    HENT: Negative.    Eyes: Negative.    Respiratory: Negative.    Cardiovascular: Negative.    Gastrointestinal: Negative.    Endocrine: Negative.    Genitourinary: Negative.    Musculoskeletal: Negative.    Skin: Negative.    Allergic/Immunologic: Negative.    Neurological: Negative.    Hematological: Negative.    Psychiatric/Behavioral: Negative.        Objective  /78   Pulse 66   Resp 16   Ht 160 cm (63\")   Wt 52.2 kg " (115 lb)   SpO2 97%   BMI 20.37 kg/m²   Physical Exam   Constitutional: She is oriented to person, place, and time. She appears well-developed and well-nourished.   HENT:   Head: Normocephalic and atraumatic.   Right Ear: External ear normal.   Left Ear: External ear normal.   Nose: Nose normal.   Mouth/Throat: Oropharynx is clear and moist.   Eyes: Conjunctivae and EOM are normal. Pupils are equal, round, and reactive to light.   Neck: Normal range of motion. Neck supple.   Cardiovascular: Normal rate, regular rhythm, normal heart sounds and intact distal pulses.   Pulmonary/Chest: Effort normal and breath sounds normal.   Abdominal: Soft. Bowel sounds are normal.   Musculoskeletal: Normal range of motion.   Neurological: She is alert and oriented to person, place, and time.   Skin: Skin is warm. Capillary refill takes less than 2 seconds.   Psychiatric: She has a normal mood and affect. Her behavior is normal. Judgment and thought content normal.   Vitals reviewed.      Assessment/Plan   Lavinia was seen today for follow-up.    Diagnoses and all orders for this visit:    Mixed hyperlipidemia  -     CBC w AUTO Differential  -     Comprehensive metabolic panel  -     Lipid Panel With / Chol / HDL Ratio                 Orders Placed This Encounter   Procedures   • Comprehensive metabolic panel   • Lipid Panel With / Chol / HDL Ratio   • CBC w AUTO Differential     Order Specific Question:   Manual Differential     Answer:   No       Follow up: 6 month(s)

## 2019-06-05 LAB
ALBUMIN SERPL-MCNC: 3.8 G/DL (ref 3.5–5.2)
ALBUMIN/GLOB SERPL: 1.4 G/DL
ALP SERPL-CCNC: 61 U/L (ref 39–117)
ALT SERPL-CCNC: 12 U/L (ref 1–33)
AST SERPL-CCNC: 19 U/L (ref 1–32)
BASOPHILS # BLD AUTO: 0.02 10*3/MM3 (ref 0–0.2)
BASOPHILS NFR BLD AUTO: 0.4 % (ref 0–1.5)
BILIRUB SERPL-MCNC: 0.4 MG/DL (ref 0.2–1.2)
BUN SERPL-MCNC: 13 MG/DL (ref 8–23)
BUN/CREAT SERPL: 17.3 (ref 7–25)
CALCIUM SERPL-MCNC: 9.8 MG/DL (ref 8.6–10.5)
CHLORIDE SERPL-SCNC: 102 MMOL/L (ref 98–107)
CHOLEST SERPL-MCNC: 189 MG/DL (ref 0–200)
CHOLEST/HDLC SERPL: 2.25 {RATIO}
CO2 SERPL-SCNC: 31.3 MMOL/L (ref 22–29)
CREAT SERPL-MCNC: 0.75 MG/DL (ref 0.57–1)
EOSINOPHIL # BLD AUTO: 0.06 10*3/MM3 (ref 0–0.4)
EOSINOPHIL NFR BLD AUTO: 1.1 % (ref 0.3–6.2)
ERYTHROCYTE [DISTWIDTH] IN BLOOD BY AUTOMATED COUNT: 12.9 % (ref 12.3–15.4)
GLOBULIN SER CALC-MCNC: 2.8 GM/DL
GLUCOSE SERPL-MCNC: 98 MG/DL (ref 65–99)
HCT VFR BLD AUTO: 43.7 % (ref 34–46.6)
HDLC SERPL-MCNC: 84 MG/DL (ref 40–60)
HGB BLD-MCNC: 13.8 G/DL (ref 12–15.9)
IMM GRANULOCYTES # BLD AUTO: 0.02 10*3/MM3 (ref 0–0.05)
IMM GRANULOCYTES NFR BLD AUTO: 0.4 % (ref 0–0.5)
LDLC SERPL CALC-MCNC: 85 MG/DL (ref 0–100)
LYMPHOCYTES # BLD AUTO: 1.8 10*3/MM3 (ref 0.7–3.1)
LYMPHOCYTES NFR BLD AUTO: 32.8 % (ref 19.6–45.3)
MCH RBC QN AUTO: 32.3 PG (ref 26.6–33)
MCHC RBC AUTO-ENTMCNC: 31.6 G/DL (ref 31.5–35.7)
MCV RBC AUTO: 102.3 FL (ref 79–97)
MONOCYTES # BLD AUTO: 0.43 10*3/MM3 (ref 0.1–0.9)
MONOCYTES NFR BLD AUTO: 7.8 % (ref 5–12)
NEUTROPHILS # BLD AUTO: 3.15 10*3/MM3 (ref 1.7–7)
NEUTROPHILS NFR BLD AUTO: 57.5 % (ref 42.7–76)
NRBC BLD AUTO-RTO: 0 /100 WBC (ref 0–0.2)
PLATELET # BLD AUTO: 192 10*3/MM3 (ref 140–450)
POTASSIUM SERPL-SCNC: 4.3 MMOL/L (ref 3.5–5.2)
PROT SERPL-MCNC: 6.6 G/DL (ref 6–8.5)
RBC # BLD AUTO: 4.27 10*6/MM3 (ref 3.77–5.28)
SODIUM SERPL-SCNC: 141 MMOL/L (ref 136–145)
TRIGL SERPL-MCNC: 99 MG/DL (ref 0–150)
VLDLC SERPL CALC-MCNC: 19.8 MG/DL
WBC # BLD AUTO: 5.48 10*3/MM3 (ref 3.4–10.8)

## 2019-09-13 ENCOUNTER — TELEPHONE (OUTPATIENT)
Dept: FAMILY MEDICINE CLINIC | Facility: CLINIC | Age: 74
End: 2019-09-13

## 2019-09-13 ENCOUNTER — OFFICE VISIT (OUTPATIENT)
Dept: FAMILY MEDICINE CLINIC | Facility: CLINIC | Age: 74
End: 2019-09-13

## 2019-09-13 VITALS
OXYGEN SATURATION: 98 % | RESPIRATION RATE: 16 BRPM | DIASTOLIC BLOOD PRESSURE: 72 MMHG | BODY MASS INDEX: 20.38 KG/M2 | WEIGHT: 115 LBS | HEIGHT: 63 IN | HEART RATE: 71 BPM | SYSTOLIC BLOOD PRESSURE: 142 MMHG

## 2019-09-13 DIAGNOSIS — L98.9 SKIN LESION: Primary | ICD-10-CM

## 2019-09-13 PROCEDURE — 99213 OFFICE O/P EST LOW 20 MIN: CPT | Performed by: FAMILY MEDICINE

## 2019-09-13 RX ORDER — CEPHALEXIN 500 MG/1
500 CAPSULE ORAL 2 TIMES DAILY
Qty: 14 CAPSULE | Refills: 0 | Status: SHIPPED | OUTPATIENT
Start: 2019-09-13 | End: 2019-12-03

## 2019-09-13 RX ORDER — ACYCLOVIR 200 MG/1
200 CAPSULE ORAL
Qty: 25 CAPSULE | Refills: 0 | Status: SHIPPED | OUTPATIENT
Start: 2019-09-13 | End: 2019-12-03

## 2019-09-13 NOTE — PROGRESS NOTES
"Subjective   Lavinia Aguilar is a 74 y.o. female.     Chief Complaint   Patient presents with   • Rash     pt says that she has either a rash that may be shingles or a bug bite on her left mid back just above her bra line        History of Present Illness     noted prurric but \"stinging\" type lesion over left chest      Current Outpatient Medications:   •  acyclovir (ZOVIRAX) 200 MG capsule, Take 1 capsule by mouth Every 4 (Four) Hours While Awake., Disp: 25 capsule, Rfl: 0  •  cephalexin (KEFLEX) 500 MG capsule, Take 1 capsule by mouth 2 (Two) Times a Day., Disp: 14 capsule, Rfl: 0  •  Coenzyme Q-10 100 MG capsule, Take 100 mg by mouth., Disp: , Rfl:   •  estrogens, conjugated, (PREMARIN) 0.625 MG tablet, Take 1 tablet by mouth Daily. Take daily, Disp: 90 tablet, Rfl: 1  •  Multiple Vitamins-Minerals (MULTIVITAMIN ADULT PO), Take  by mouth., Disp: , Rfl:   •  rosuvastatin (CRESTOR) 10 MG tablet, Take 1 tablet by mouth Daily., Disp: 90 tablet, Rfl: 1  No Known Allergies    Past Medical History:   Diagnosis Date   • Bone/cartilage disorder    • Hyperlipidemia    • Left rotator cuff tear    • PONV (postoperative nausea and vomiting)      Past Surgical History:   Procedure Laterality Date   • CARPAL TUNNEL RELEASE     • CERVICAL SPINE SURGERY     • COLONOSCOPY     • COLONOSCOPY  06/19/2013    internal hemorrhoid   • COLONOSCOPY N/A 7/11/2018    Procedure: COLONOSCOPY WITH ANESTHESIA;  Surgeon: Andrew Palm MD;  Location: [facility] ENDOSCOPY;  Service: Gastroenterology   • HAMMER TOE REPAIR     • HYSTERECTOMY     • INSERTION / REMOVAL CRANIAL DBS GENERATOR     • KNEE CARTILAGE SURGERY     • NASAL SEPTUM SURGERY     • RECTAL SURGERY      rectocele   • SHOULDER ROTATOR CUFF REPAIR Left 02/2018   • SHOULDER SURGERY Right    • TONSILLECTOMY     • TUBAL ABDOMINAL LIGATION         Review of Systems   Constitutional: Negative.    HENT: Negative.    Eyes: Negative.    Respiratory: Negative.    Cardiovascular: Negative.  " "  Gastrointestinal: Negative.    Endocrine: Negative.    Genitourinary: Negative.    Musculoskeletal: Negative.    Skin: Positive for rash.   Allergic/Immunologic: Negative.    Neurological: Negative.    Hematological: Negative.    Psychiatric/Behavioral: Negative.        Objective  /72   Pulse 71   Resp 16   Ht 160 cm (63\")   Wt 52.2 kg (115 lb)   SpO2 98%   BMI 20.37 kg/m²   Physical Exam   Constitutional: She is oriented to person, place, and time. She appears well-developed and well-nourished.   HENT:   Head: Normocephalic and atraumatic.   Eyes: EOM are normal. Pupils are equal, round, and reactive to light.   Neck: Normal range of motion. Neck supple.   Cardiovascular: Normal rate, regular rhythm, normal heart sounds and intact distal pulses.   Pulmonary/Chest: Effort normal and breath sounds normal.   Abdominal: Soft.   Musculoskeletal: Normal range of motion.   Neurological: She is alert and oriented to person, place, and time.   Skin: Skin is warm. Capillary refill takes less than 2 seconds. Rash noted. There is erythema.   Psychiatric: She has a normal mood and affect. Her behavior is normal. Judgment and thought content normal.   Vitals reviewed.      Assessment/Plan   Laivnia was seen today for rash.    Diagnoses and all orders for this visit:    Skin lesion    Other orders  -     cephalexin (KEFLEX) 500 MG capsule; Take 1 capsule by mouth 2 (Two) Times a Day.  -     acyclovir (ZOVIRAX) 200 MG capsule; Take 1 capsule by mouth Every 4 (Four) Hours While Awake.    keep me informed             No orders of the defined types were placed in this encounter.      Follow u[ mext a[[t  "

## 2019-09-13 NOTE — TELEPHONE ENCOUNTER
Pt called and said that she has 3 spots under her bra line that are red they burn itch and very painful with some blisters it sounds like shingles do u want to see or call in meds [phone]

## 2019-10-20 ENCOUNTER — HOSPITAL ENCOUNTER (EMERGENCY)
Age: 74
Discharge: HOME | End: 2019-10-20
Payer: MEDICARE

## 2019-10-20 ENCOUNTER — APPOINTMENT (OUTPATIENT)
Dept: GENERAL RADIOLOGY | Age: 74
End: 2019-10-20
Payer: MEDICARE

## 2019-10-20 VITALS
HEIGHT: 63 IN | DIASTOLIC BLOOD PRESSURE: 66 MMHG | RESPIRATION RATE: 16 BRPM | WEIGHT: 115 LBS | TEMPERATURE: 97.7 F | OXYGEN SATURATION: 94 % | BODY MASS INDEX: 20.38 KG/M2 | HEART RATE: 77 BPM | SYSTOLIC BLOOD PRESSURE: 155 MMHG

## 2019-10-20 DIAGNOSIS — S49.92XA INJURY OF LEFT SHOULDER, INITIAL ENCOUNTER: Primary | ICD-10-CM

## 2019-10-20 PROCEDURE — 73030 X-RAY EXAM OF SHOULDER: CPT

## 2019-10-20 PROCEDURE — 99283 EMERGENCY DEPT VISIT LOW MDM: CPT

## 2019-10-20 RX ORDER — MORPHINE SULFATE 4 MG/ML
4 INJECTION, SOLUTION INTRAMUSCULAR; INTRAVENOUS ONCE
Status: DISCONTINUED | OUTPATIENT
Start: 2019-10-20 | End: 2019-10-20 | Stop reason: HOSPADM

## 2019-10-20 RX ORDER — ONDANSETRON 2 MG/ML
4 INJECTION INTRAMUSCULAR; INTRAVENOUS ONCE
Status: DISCONTINUED | OUTPATIENT
Start: 2019-10-20 | End: 2019-10-20 | Stop reason: HOSPADM

## 2019-10-20 RX ORDER — POLYETHYLENE GLYCOL 3350 17 G/17G
17 POWDER, FOR SOLUTION ORAL DAILY
Qty: 510 G | Refills: 0 | Status: SHIPPED | OUTPATIENT
Start: 2019-10-20 | End: 2019-11-19

## 2019-10-20 RX ORDER — HYDROCODONE BITARTRATE AND ACETAMINOPHEN 5; 325 MG/1; MG/1
1 TABLET ORAL EVERY 6 HOURS PRN
Qty: 12 TABLET | Refills: 0 | Status: SHIPPED | OUTPATIENT
Start: 2019-10-20 | End: 2019-10-23

## 2019-10-20 ASSESSMENT — ENCOUNTER SYMPTOMS
SHORTNESS OF BREATH: 0
ABDOMINAL PAIN: 0

## 2019-10-20 ASSESSMENT — PAIN DESCRIPTION - ORIENTATION: ORIENTATION: LEFT

## 2019-10-20 ASSESSMENT — PAIN DESCRIPTION - LOCATION: LOCATION: SHOULDER

## 2019-10-20 ASSESSMENT — PAIN SCALES - GENERAL: PAINLEVEL_OUTOF10: 6

## 2019-12-03 ENCOUNTER — OFFICE VISIT (OUTPATIENT)
Dept: FAMILY MEDICINE CLINIC | Facility: CLINIC | Age: 74
End: 2019-12-03

## 2019-12-03 VITALS
RESPIRATION RATE: 18 BRPM | HEART RATE: 95 BPM | BODY MASS INDEX: 19.67 KG/M2 | SYSTOLIC BLOOD PRESSURE: 132 MMHG | HEIGHT: 63 IN | OXYGEN SATURATION: 95 % | DIASTOLIC BLOOD PRESSURE: 82 MMHG | WEIGHT: 111 LBS

## 2019-12-03 DIAGNOSIS — J01.90 ACUTE SINUSITIS, RECURRENCE NOT SPECIFIED, UNSPECIFIED LOCATION: ICD-10-CM

## 2019-12-03 DIAGNOSIS — Z79.890 HORMONE REPLACEMENT THERAPY: ICD-10-CM

## 2019-12-03 DIAGNOSIS — E78.2 MIXED HYPERLIPIDEMIA: Primary | ICD-10-CM

## 2019-12-03 PROCEDURE — 99213 OFFICE O/P EST LOW 20 MIN: CPT | Performed by: FAMILY MEDICINE

## 2019-12-03 RX ORDER — AMOXICILLIN AND CLAVULANATE POTASSIUM 875; 125 MG/1; MG/1
1 TABLET, FILM COATED ORAL 2 TIMES DAILY
Qty: 20 TABLET | Refills: 0 | Status: SHIPPED | OUTPATIENT
Start: 2019-12-03 | End: 2020-06-02

## 2019-12-03 RX ORDER — AMOXICILLIN AND CLAVULANATE POTASSIUM 875; 125 MG/1; MG/1
1 TABLET, FILM COATED ORAL 2 TIMES DAILY
Qty: 20 TABLET | Refills: 0 | Status: SHIPPED | OUTPATIENT
Start: 2019-12-03 | End: 2019-12-03 | Stop reason: SDUPTHER

## 2019-12-03 NOTE — PROGRESS NOTES
Subjective   Lavinia Aguilar is a 74 y.o. female.     Chief Complaint   Patient presents with   • Follow-up     6 mo   hyperlipidemia        History of Present Illness     she is tolerating statin fdrugs witjout myalgias...she is also toleaging hrt...  She is noting sinus paind and pressure ove rthe past few days    Current Outpatient Medications:   •  Coenzyme Q-10 100 MG capsule, Take 100 mg by mouth., Disp: , Rfl:   •  estrogens, conjugated, (PREMARIN) 0.625 MG tablet, Take 1 tablet by mouth Daily. Take daily, Disp: 90 tablet, Rfl: 1  •  Multiple Vitamins-Minerals (MULTIVITAMIN ADULT PO), Take  by mouth., Disp: , Rfl:   •  rosuvastatin (CRESTOR) 10 MG tablet, Take 1 tablet by mouth Daily., Disp: 90 tablet, Rfl: 1  •  amoxicillin-clavulanate (AUGMENTIN) 875-125 MG per tablet, Take 1 tablet by mouth 2 (Two) Times a Day., Disp: 20 tablet, Rfl: 0  No Known Allergies    Past Medical History:   Diagnosis Date   • Bone/cartilage disorder    • Hyperlipidemia    • Left rotator cuff tear    • PONV (postoperative nausea and vomiting)      Past Surgical History:   Procedure Laterality Date   • CARPAL TUNNEL RELEASE     • CERVICAL SPINE SURGERY     • COLONOSCOPY     • COLONOSCOPY  06/19/2013    internal hemorrhoid   • COLONOSCOPY N/A 7/11/2018    Procedure: COLONOSCOPY WITH ANESTHESIA;  Surgeon: Andrew Palm MD;  Location: [facility] ENDOSCOPY;  Service: Gastroenterology   • HAMMER TOE REPAIR     • HYSTERECTOMY     • INSERTION / REMOVAL CRANIAL DBS GENERATOR     • KNEE CARTILAGE SURGERY     • NASAL SEPTUM SURGERY     • RECTAL SURGERY      rectocele   • SHOULDER ROTATOR CUFF REPAIR Left 02/2018   • SHOULDER SURGERY Right    • TONSILLECTOMY     • TUBAL ABDOMINAL LIGATION         Review of Systems   Constitutional: Negative.    HENT: Positive for congestion, rhinorrhea, sinus pressure and sinus pain.    Eyes: Negative.    Respiratory: Negative.    Cardiovascular: Negative.    Gastrointestinal: Negative.    Endocrine: Negative.   "  Genitourinary: Negative.    Musculoskeletal: Negative.    Skin: Negative.    Allergic/Immunologic: Negative.    Neurological: Negative.    Hematological: Negative.    Psychiatric/Behavioral: Negative.        Objective  /82   Pulse 95   Resp 18   Ht 160 cm (63\")   Wt 50.3 kg (111 lb)   SpO2 95%   BMI 19.66 kg/m²   Physical Exam   Constitutional: She is oriented to person, place, and time. She appears well-developed and well-nourished.   HENT:   Head: Normocephalic and atraumatic.   Right Ear: External ear normal.   Left Ear: External ear normal.   Nose: Nose normal.   Mouth/Throat: Oropharynx is clear and moist.   Eyes: Conjunctivae and EOM are normal. Pupils are equal, round, and reactive to light.   Neck: Normal range of motion. Neck supple.   Cardiovascular: Normal rate, regular rhythm, normal heart sounds and intact distal pulses.   Pulmonary/Chest: Effort normal and breath sounds normal.   Abdominal: Soft. Bowel sounds are normal.   Musculoskeletal: Normal range of motion.   Neurological: She is alert and oriented to person, place, and time.   Skin: Skin is warm. Capillary refill takes less than 2 seconds.   Psychiatric: She has a normal mood and affect. Her behavior is normal. Judgment and thought content normal.   Nursing note and vitals reviewed.      Assessment/Plan   Lavinia was seen today for follow-up.    Diagnoses and all orders for this visit:    Mixed hyperlipidemia  -     Comprehensive metabolic panel  -     Lipid Panel With / Chol / HDL Ratio    Hormone replacement therapy    Acute sinusitis, recurrence not specified, unspecified location    Other orders  -     amoxicillin-clavulanate (AUGMENTIN) 875-125 MG per tablet; Take 1 tablet by mouth 2 (Two) Times a Day.      She says she is up to date on mammo and colon ca screening    Current outpatient and discharge medications have been reconciled for the patient.  Reviewed by: Praveen Leung MD    Patient's Body mass index is 19.66 " kg/m². BMI is within normal parameters. No follow-up required..    Orders Placed This Encounter   Procedures   • Comprehensive metabolic panel   • Lipid Panel With / Chol / HDL Ratio       Follow up: 6 month(s)

## 2019-12-04 LAB
ALBUMIN SERPL-MCNC: 4.1 G/DL (ref 3.5–5.2)
ALBUMIN/GLOB SERPL: 1.5 G/DL
ALP SERPL-CCNC: 81 U/L (ref 39–117)
ALT SERPL-CCNC: 13 U/L (ref 1–33)
AST SERPL-CCNC: 19 U/L (ref 1–32)
BILIRUB SERPL-MCNC: 0.3 MG/DL (ref 0.2–1.2)
BUN SERPL-MCNC: 10 MG/DL (ref 8–23)
BUN/CREAT SERPL: 13.5 (ref 7–25)
CALCIUM SERPL-MCNC: 9.3 MG/DL (ref 8.6–10.5)
CHLORIDE SERPL-SCNC: 99 MMOL/L (ref 98–107)
CHOLEST SERPL-MCNC: 188 MG/DL (ref 0–200)
CHOLEST/HDLC SERPL: 2.81 {RATIO}
CO2 SERPL-SCNC: 31.4 MMOL/L (ref 22–29)
CREAT SERPL-MCNC: 0.74 MG/DL (ref 0.57–1)
GLOBULIN SER CALC-MCNC: 2.7 GM/DL
GLUCOSE SERPL-MCNC: 103 MG/DL (ref 65–99)
HDLC SERPL-MCNC: 67 MG/DL (ref 40–60)
LDLC SERPL CALC-MCNC: 97 MG/DL (ref 0–100)
POTASSIUM SERPL-SCNC: 4.1 MMOL/L (ref 3.5–5.2)
PROT SERPL-MCNC: 6.8 G/DL (ref 6–8.5)
SODIUM SERPL-SCNC: 143 MMOL/L (ref 136–145)
TRIGL SERPL-MCNC: 122 MG/DL (ref 0–150)
VLDLC SERPL CALC-MCNC: 24.4 MG/DL

## 2019-12-04 RX ORDER — ROSUVASTATIN CALCIUM 10 MG/1
10 TABLET, COATED ORAL DAILY
Qty: 90 TABLET | Refills: 1 | Status: SHIPPED | OUTPATIENT
Start: 2019-12-04 | End: 2020-01-17 | Stop reason: SDUPTHER

## 2020-01-17 RX ORDER — ROSUVASTATIN CALCIUM 10 MG/1
10 TABLET, COATED ORAL DAILY
Qty: 90 TABLET | Refills: 1 | Status: SHIPPED | OUTPATIENT
Start: 2020-01-17 | End: 2020-06-02 | Stop reason: SDUPTHER

## 2020-06-02 ENCOUNTER — OFFICE VISIT (OUTPATIENT)
Dept: FAMILY MEDICINE CLINIC | Facility: CLINIC | Age: 75
End: 2020-06-02

## 2020-06-02 VITALS
OXYGEN SATURATION: 98 % | TEMPERATURE: 97.6 F | RESPIRATION RATE: 16 BRPM | BODY MASS INDEX: 20.02 KG/M2 | HEART RATE: 80 BPM | WEIGHT: 113 LBS | SYSTOLIC BLOOD PRESSURE: 128 MMHG | HEIGHT: 63 IN | DIASTOLIC BLOOD PRESSURE: 84 MMHG

## 2020-06-02 DIAGNOSIS — Z79.890 HORMONE REPLACEMENT THERAPY: ICD-10-CM

## 2020-06-02 DIAGNOSIS — E78.2 MIXED HYPERLIPIDEMIA: Primary | ICD-10-CM

## 2020-06-02 PROCEDURE — 99213 OFFICE O/P EST LOW 20 MIN: CPT | Performed by: FAMILY MEDICINE

## 2020-06-02 PROCEDURE — G0439 PPPS, SUBSEQ VISIT: HCPCS | Performed by: FAMILY MEDICINE

## 2020-06-02 RX ORDER — ESTRADIOL 0.05 MG/D
1 PATCH TRANSDERMAL WEEKLY
Qty: 4 PATCH | Refills: 5 | Status: SHIPPED | OUTPATIENT
Start: 2020-06-02 | End: 2020-12-22 | Stop reason: SDUPTHER

## 2020-06-02 RX ORDER — ROSUVASTATIN CALCIUM 10 MG/1
10 TABLET, COATED ORAL DAILY
Qty: 90 TABLET | Refills: 1 | Status: SHIPPED | OUTPATIENT
Start: 2020-06-02 | End: 2020-12-22 | Stop reason: SDUPTHER

## 2020-06-02 NOTE — PROGRESS NOTES
Subjective   Lavinia Aguilar is a 74 y.o. female.     Chief Complaint   Patient presents with   • Medicare Wellness-subsequent   • Med Management     discuss hormone replacement        History of Present Illness     she is tolerating statin without myalgias--her gyn wanted her to change to hormone patches---she is tolerating it       Current Outpatient Medications:   •  Coenzyme Q-10 100 MG capsule, Take 100 mg by mouth., Disp: , Rfl:   •  estrogens, conjugated, (PREMARIN) 0.625 MG tablet, Take 1 tablet by mouth Daily. Take daily, Disp: 90 tablet, Rfl: 1  •  Multiple Vitamins-Minerals (MULTIVITAMIN ADULT PO), Take  by mouth., Disp: , Rfl:   •  rosuvastatin (CRESTOR) 10 MG tablet, Take 1 tablet by mouth Daily., Disp: 90 tablet, Rfl: 1  No Known Allergies    Past Medical History:   Diagnosis Date   • Bone/cartilage disorder    • Hyperlipidemia    • Left rotator cuff tear    • PONV (postoperative nausea and vomiting)      Past Surgical History:   Procedure Laterality Date   • CARPAL TUNNEL RELEASE     • CERVICAL SPINE SURGERY     • COLONOSCOPY     • COLONOSCOPY  06/19/2013    internal hemorrhoid   • COLONOSCOPY N/A 7/11/2018    Procedure: COLONOSCOPY WITH ANESTHESIA;  Surgeon: Andrew Palm MD;  Location: [facility] ENDOSCOPY;  Service: Gastroenterology   • HAMMER TOE REPAIR     • HYSTERECTOMY     • INSERTION / REMOVAL CRANIAL DBS GENERATOR     • KNEE CARTILAGE SURGERY     • NASAL SEPTUM SURGERY     • RECTAL SURGERY      rectocele   • SHOULDER ROTATOR CUFF REPAIR Left 02/2018   • SHOULDER SURGERY Right    • TONSILLECTOMY     • TUBAL ABDOMINAL LIGATION         Review of Systems   Constitutional: Negative.    HENT: Negative.    Eyes: Negative.    Respiratory: Negative.    Cardiovascular: Negative.    Gastrointestinal: Negative.    Endocrine: Negative.    Genitourinary: Negative.    Musculoskeletal: Negative.    Skin: Negative.    Allergic/Immunologic: Negative.    Neurological: Negative.    Hematological: Negative.   "  Psychiatric/Behavioral: Negative.        Objective  /84 (BP Location: Left arm)   Pulse 80   Temp 97.6 °F (36.4 °C) (Temporal)   Resp 16   Ht 160 cm (63\")   Wt 51.3 kg (113 lb)   SpO2 98%   BMI 20.02 kg/m²   Physical Exam   Constitutional: She is oriented to person, place, and time. She appears well-developed and well-nourished.   HENT:   Head: Normocephalic and atraumatic.   Right Ear: External ear normal.   Left Ear: External ear normal.   Nose: Nose normal.   Mouth/Throat: Oropharynx is clear and moist.   Eyes: Pupils are equal, round, and reactive to light. Conjunctivae and EOM are normal.   Neck: Normal range of motion. Neck supple.   Cardiovascular: Normal rate, regular rhythm, normal heart sounds and intact distal pulses.   Pulmonary/Chest: Effort normal and breath sounds normal.   Abdominal: Soft. Bowel sounds are normal.   Musculoskeletal: Normal range of motion.   Neurological: She is alert and oriented to person, place, and time.   Skin: Skin is warm. Capillary refill takes less than 2 seconds.   Psychiatric: She has a normal mood and affect. Her behavior is normal. Judgment and thought content normal.   Nursing note and vitals reviewed.      Assessment/Plan   Lavinia was seen today for medicare wellness-subsequent and med management.    Diagnoses and all orders for this visit:    Mixed hyperlipidemia  -     CBC w AUTO Differential  -     Comprehensive metabolic panel  -     Lipid Panel With / Chol / HDL Ratio    Hormone replacement therapy  -     CBC w AUTO Differential  -     Comprehensive metabolic panel  -     Lipid Panel With / Chol / HDL Ratio          Stop premarin   and change to hormon patche       Orders Placed This Encounter   Procedures   • Comprehensive metabolic panel   • Lipid Panel With / Chol / HDL Ratio   • CBC w AUTO Differential     Order Specific Question:   Manual Differential     Answer:   No       Follow up: 6 month(s)  "

## 2020-06-02 NOTE — PROGRESS NOTES
The ABCs of the Annual Wellness Visit  Subsequent Medicare Wellness Visit    Chief Complaint   Patient presents with   • Medicare Wellness-subsequent   • Med Management     discuss hormone replacement       Subjective   History of Present Illness:  Lavinia Aguilar is a 74 y.o. female who presents for a Subsequent Medicare Wellness Visit.    HEALTH RISK ASSESSMENT    Recent Hospitalizations:  No hospitalization(s) within the last year.    Current Medical Providers:  Patient Care Team:  Praveen Leung MD as PCP - General (Family Medicine)  Praveen Leung MD as PCP - Claims Attributed  Andrew Palm MD as Consulting Physician (Gastroenterology)  Vivien Solis MD as Consulting Physician (Obstetrics and Gynecology)    Smoking Status:  Social History     Tobacco Use   Smoking Status Never Smoker   Smokeless Tobacco Never Used       Alcohol Consumption:  Social History     Substance and Sexual Activity   Alcohol Use No       Depression Screen:   PHQ-2/PHQ-9 Depression Screening 6/2/2020   Little interest or pleasure in doing things 0   Feeling down, depressed, or hopeless 0   Trouble falling or staying asleep, or sleeping too much -   Feeling tired or having little energy -   Poor appetite or overeating -   Feeling bad about yourself - or that you are a failure or have let yourself or your family down -   Trouble concentrating on things, such as reading the newspaper or watching television -   Moving or speaking so slowly that other people could have noticed. Or the opposite - being so fidgety or restless that you have been moving around a lot more than usual -   Thoughts that you would be better off dead, or of hurting yourself in some way -   Total Score 0   If you checked off any problems, how difficult have these problems made it for you to do your work, take care of things at home, or get along with other people? -       Fall Risk Screen:  STEADI Fall Risk Assessment was completed, and patient  is at MODERATE risk for falls. Assessment completed on:6/2/2020    Health Habits and Functional and Cognitive Screening:  Functional & Cognitive Status 6/5/2018   Do you have difficulty preparing food and eating? No   Do you have difficulty bathing yourself, getting dressed or grooming yourself? No   Do you have difficulty using the toilet? No   Do you have difficulty moving around from place to place? No   Do you have trouble with steps or getting out of a bed or a chair? No   Current Diet Well Balanced Diet   Dental Exam Not up to date   Eye Exam Up to date   Exercise (times per week) 3 times per week   Current Exercise Activities Include Gardening   Do you need help using the phone?  No   Are you deaf or do you have serious difficulty hearing?  No   Do you need help with transportation? No   Do you need help shopping? No   Do you need help preparing meals?  No   Do you need help with housework?  No   Do you need help with laundry? No   Do you need help taking your medications? No   Do you need help managing money? No   Do you ever drive or ride in a car without wearing a seat belt? No   Have you felt unusual stress, anger or loneliness in the last month? No   Who do you live with? Spouse   If you need help, do you have trouble finding someone available to you? No   Have you been bothered in the last four weeks by sexual problems? No   Do you have difficulty concentrating, remembering or making decisions? No         Does the patient have evidence of cognitive impairment? No    Asprin use counseling:Does not need ASA (and currently is not on it)    Age-appropriate Screening Schedule:  Refer to the list below for future screening recommendations based on patient's age, sex and/or medical conditions. Orders for these recommended tests are listed in the plan section. The patient has been provided with a written plan.    Health Maintenance   Topic Date Due   • TDAP/TD VACCINES (1 - Tdap) 06/04/1956   • ZOSTER VACCINE  (1 of 2) 06/04/1995   • INFLUENZA VACCINE  08/01/2020   • LIPID PANEL  12/03/2020   • MAMMOGRAM  12/12/2020   • DXA SCAN  12/12/2020   • COLONOSCOPY  07/11/2028          The following portions of the patient's history were reviewed and updated as appropriate: allergies, current medications, past family history, past medical history, past social history, past surgical history and problem list.    Outpatient Medications Prior to Visit   Medication Sig Dispense Refill   • Coenzyme Q-10 100 MG capsule Take 100 mg by mouth.     • estrogens, conjugated, (PREMARIN) 0.625 MG tablet Take 1 tablet by mouth Daily. Take daily 90 tablet 1   • Multiple Vitamins-Minerals (MULTIVITAMIN ADULT PO) Take  by mouth.     • rosuvastatin (CRESTOR) 10 MG tablet Take 1 tablet by mouth Daily. 90 tablet 1   • amoxicillin-clavulanate (AUGMENTIN) 875-125 MG per tablet Take 1 tablet by mouth 2 (Two) Times a Day. 20 tablet 0     No facility-administered medications prior to visit.        Patient Active Problem List   Diagnosis   • Mixed hyperlipidemia   • Hormone replacement therapy   • Cyst of lip   • Cold   • Palpitations   • Sprain of left rotator cuff capsule   • Family hx colonic polyps   • Herpes zoster without complication   • Skin lesion   • Acute sinusitis       Advanced Care Planning:  ACP discussion was held with the patient during this visit. Patient has an advance directive in EMR which is still valid.     Review of Systems    Compared to one year ago, the patient feels her physical health is the same.  Compared to one year ago, the patient feels her mental health is the same.    Reviewed chart for potential of high risk medication in the elderly: yes  Reviewed chart for potential of harmful drug interactions in the elderly:yes    Objective         Vitals:    06/02/20 0854   BP: 128/84   BP Location: Left arm   Pulse: 80   Resp: 16   Temp: 97.6 °F (36.4 °C)   TempSrc: Temporal   SpO2: 98%   Weight: 51.3 kg (113 lb)   Height: 160 cm  "(63\")   PainSc: 0-No pain       Body mass index is 20.02 kg/m².  Discussed the patient's BMI with her. The BMI is in the acceptable range.    Physical Exam          Assessment/Plan   Medicare Risks and Personalized Health Plan  CMS Preventative Services Quick Reference  Advance Directive Discussion    The above risks/problems have been discussed with the patient.  Pertinent information has been shared with the patient in the After Visit Summary.  Follow up plans and orders are seen below in the Assessment/Plan Section.    Diagnoses and all orders for this visit:    1. Mixed hyperlipidemia (Primary)  -     CBC w AUTO Differential  -     Comprehensive metabolic panel  -     Lipid Panel With / Chol / HDL Ratio    2. Hormone replacement therapy  -     CBC w AUTO Differential  -     Comprehensive metabolic panel  -     Lipid Panel With / Chol / HDL Ratio    Other orders  -     estradiol (Climara) 0.05 MG/24HR patch; Place 1 patch on the skin as directed by provider 1 (One) Time Per Week.  Dispense: 4 patch; Refill: 5      Follow Up:  No follow-ups on file.     An After Visit Summary and PPPS were given to the patient.             "

## 2020-06-03 LAB
ALBUMIN SERPL-MCNC: 4.4 G/DL (ref 3.5–5.2)
ALBUMIN/GLOB SERPL: 1.8 G/DL
ALP SERPL-CCNC: 60 U/L (ref 39–117)
ALT SERPL-CCNC: 17 U/L (ref 1–33)
AST SERPL-CCNC: 23 U/L (ref 1–32)
BASOPHILS # BLD AUTO: 0.02 10*3/MM3 (ref 0–0.2)
BASOPHILS NFR BLD AUTO: 0.4 % (ref 0–1.5)
BILIRUB SERPL-MCNC: 0.4 MG/DL (ref 0.2–1.2)
BUN SERPL-MCNC: 14 MG/DL (ref 8–23)
BUN/CREAT SERPL: 22.2 (ref 7–25)
CALCIUM SERPL-MCNC: 9.5 MG/DL (ref 8.6–10.5)
CHLORIDE SERPL-SCNC: 102 MMOL/L (ref 98–107)
CHOLEST SERPL-MCNC: 207 MG/DL (ref 0–200)
CHOLEST/HDLC SERPL: 2.52 {RATIO}
CO2 SERPL-SCNC: 26.9 MMOL/L (ref 22–29)
CREAT SERPL-MCNC: 0.63 MG/DL (ref 0.57–1)
EOSINOPHIL # BLD AUTO: 0.05 10*3/MM3 (ref 0–0.4)
EOSINOPHIL NFR BLD AUTO: 1 % (ref 0.3–6.2)
ERYTHROCYTE [DISTWIDTH] IN BLOOD BY AUTOMATED COUNT: 12 % (ref 12.3–15.4)
GLOBULIN SER CALC-MCNC: 2.4 GM/DL
GLUCOSE SERPL-MCNC: 116 MG/DL (ref 65–99)
HCT VFR BLD AUTO: 42 % (ref 34–46.6)
HDLC SERPL-MCNC: 82 MG/DL (ref 40–60)
HGB BLD-MCNC: 14.3 G/DL (ref 12–15.9)
IMM GRANULOCYTES # BLD AUTO: 0.01 10*3/MM3 (ref 0–0.05)
IMM GRANULOCYTES NFR BLD AUTO: 0.2 % (ref 0–0.5)
LDLC SERPL CALC-MCNC: 105 MG/DL (ref 0–100)
LYMPHOCYTES # BLD AUTO: 1.7 10*3/MM3 (ref 0.7–3.1)
LYMPHOCYTES NFR BLD AUTO: 32.9 % (ref 19.6–45.3)
MCH RBC QN AUTO: 32.7 PG (ref 26.6–33)
MCHC RBC AUTO-ENTMCNC: 34 G/DL (ref 31.5–35.7)
MCV RBC AUTO: 96.1 FL (ref 79–97)
MONOCYTES # BLD AUTO: 0.44 10*3/MM3 (ref 0.1–0.9)
MONOCYTES NFR BLD AUTO: 8.5 % (ref 5–12)
NEUTROPHILS # BLD AUTO: 2.95 10*3/MM3 (ref 1.7–7)
NEUTROPHILS NFR BLD AUTO: 57 % (ref 42.7–76)
NRBC BLD AUTO-RTO: 0 /100 WBC (ref 0–0.2)
PLATELET # BLD AUTO: 213 10*3/MM3 (ref 140–450)
POTASSIUM SERPL-SCNC: 4.3 MMOL/L (ref 3.5–5.2)
PROT SERPL-MCNC: 6.8 G/DL (ref 6–8.5)
RBC # BLD AUTO: 4.37 10*6/MM3 (ref 3.77–5.28)
SODIUM SERPL-SCNC: 140 MMOL/L (ref 136–145)
TRIGL SERPL-MCNC: 100 MG/DL (ref 0–150)
VLDLC SERPL CALC-MCNC: 20 MG/DL (ref 5–40)
WBC # BLD AUTO: 5.17 10*3/MM3 (ref 3.4–10.8)

## 2020-07-07 ENCOUNTER — RESULTS ENCOUNTER (OUTPATIENT)
Dept: FAMILY MEDICINE CLINIC | Facility: CLINIC | Age: 75
End: 2020-07-07

## 2020-07-07 ENCOUNTER — TELEPHONE (OUTPATIENT)
Dept: FAMILY MEDICINE CLINIC | Facility: CLINIC | Age: 75
End: 2020-07-07

## 2020-07-07 DIAGNOSIS — Z20.822 CLOSE EXPOSURE TO COVID-19 VIRUS: ICD-10-CM

## 2020-07-07 DIAGNOSIS — Z20.822 CLOSE EXPOSURE TO COVID-19 VIRUS: Primary | ICD-10-CM

## 2020-07-07 NOTE — TELEPHONE ENCOUNTER
Patient called and stated that she has a situation involving a possible exposure to COVID-19. She has requested to be contacted by her PCP or a nurse to be advised on where she could or how to handle this issue.    Patient can be contacted at [phone] to clarify and confirm further needed information.

## 2020-07-07 NOTE — TELEPHONE ENCOUNTER
LVM for patient to notify that covid test orders sent to [facility] for her to get done tomorrow & to quarantine for 14 days.

## 2020-12-03 ENCOUNTER — OFFICE VISIT (OUTPATIENT)
Dept: FAMILY MEDICINE CLINIC | Facility: CLINIC | Age: 75
End: 2020-12-03

## 2020-12-03 VITALS
DIASTOLIC BLOOD PRESSURE: 80 MMHG | TEMPERATURE: 98.3 F | HEIGHT: 63 IN | HEART RATE: 82 BPM | SYSTOLIC BLOOD PRESSURE: 140 MMHG | OXYGEN SATURATION: 99 % | WEIGHT: 110 LBS | BODY MASS INDEX: 19.49 KG/M2

## 2020-12-03 DIAGNOSIS — Z79.890 HORMONE REPLACEMENT THERAPY: ICD-10-CM

## 2020-12-03 DIAGNOSIS — E78.2 MIXED HYPERLIPIDEMIA: Primary | ICD-10-CM

## 2020-12-03 PROCEDURE — 99213 OFFICE O/P EST LOW 20 MIN: CPT | Performed by: FAMILY MEDICINE

## 2020-12-03 NOTE — PROGRESS NOTES
Subjective   Lavinia Aguilar is a 75 y.o. female.     Chief Complaint   Patient presents with   • Hypertension     6 month follow-up.   • Hyperlipidemia       History of Present Illness     she is tolerating hrt without blood clots or bp issues---she als is tolerating statin witjhout myalgias      Current Outpatient Medications:   •  Calcium-Vitamin D 500-125 MG-UNIT tablet, Take 1 tablet by mouth Daily., Disp: , Rfl:   •  estradiol (Climara) 0.05 MG/24HR patch, Place 1 patch on the skin as directed by provider 1 (One) Time Per Week., Disp: 4 patch, Rfl: 5  •  Multiple Vitamins-Minerals (MULTIVITAMIN ADULT PO), Take 1 tablet by mouth Daily., Disp: , Rfl:   •  rosuvastatin (CRESTOR) 10 MG tablet, Take 1 tablet by mouth Daily., Disp: 90 tablet, Rfl: 1  No Known Allergies    Past Medical History:   Diagnosis Date   • Bone/cartilage disorder    • Hyperlipidemia    • Left rotator cuff tear    • PONV (postoperative nausea and vomiting)      Past Surgical History:   Procedure Laterality Date   • CARPAL TUNNEL RELEASE     • CERVICAL SPINE SURGERY     • COLONOSCOPY     • COLONOSCOPY  06/19/2013    internal hemorrhoid   • COLONOSCOPY N/A 7/11/2018    Procedure: COLONOSCOPY WITH ANESTHESIA;  Surgeon: Andrew Palm MD;  Location: [facility] ENDOSCOPY;  Service: Gastroenterology   • HAMMER TOE REPAIR     • HYSTERECTOMY     • INSERTION / REMOVAL CRANIAL DBS GENERATOR     • KNEE CARTILAGE SURGERY     • NASAL SEPTUM SURGERY     • RECTAL SURGERY      rectocele   • SHOULDER ROTATOR CUFF REPAIR Left 02/2018   • SHOULDER SURGERY Right    • TONSILLECTOMY     • TUBAL ABDOMINAL LIGATION         Review of Systems   Constitutional: Negative.    HENT: Negative.    Eyes: Negative.    Respiratory: Negative.    Cardiovascular: Negative.    Gastrointestinal: Negative.    Endocrine: Negative.    Genitourinary: Negative.    Musculoskeletal: Negative.    Skin: Negative.    Allergic/Immunologic: Negative.    Neurological: Negative.    Hematological:  "Negative.    Psychiatric/Behavioral: Negative.        Objective  /80 (BP Location: Left arm, Patient Position: Sitting, Cuff Size: Adult)   Pulse 82   Temp 98.3 °F (36.8 °C) (Tympanic)   Ht 160 cm (63\")   Wt 49.9 kg (110 lb)   SpO2 99%   BMI 19.49 kg/m²   Physical Exam  Vitals signs and nursing note reviewed.   Constitutional:       Appearance: Normal appearance.   HENT:      Head: Normocephalic and atraumatic.      Nose: Nose normal.      Mouth/Throat:      Mouth: Mucous membranes are dry.      Pharynx: Oropharynx is clear.   Eyes:      Extraocular Movements: Extraocular movements intact.      Conjunctiva/sclera: Conjunctivae normal.      Pupils: Pupils are equal, round, and reactive to light.   Neck:      Musculoskeletal: Normal range of motion.   Cardiovascular:      Rate and Rhythm: Normal rate and regular rhythm.      Pulses: Normal pulses.      Heart sounds: Normal heart sounds.   Pulmonary:      Effort: Pulmonary effort is normal.      Breath sounds: Normal breath sounds.   Abdominal:      General: Abdomen is flat. Bowel sounds are normal.      Palpations: Abdomen is soft.   Musculoskeletal: Normal range of motion.   Skin:     General: Skin is warm and dry.      Capillary Refill: Capillary refill takes less than 2 seconds.   Neurological:      General: No focal deficit present.      Mental Status: She is alert. Mental status is at baseline.   Psychiatric:         Mood and Affect: Mood normal.         Behavior: Behavior normal.         Thought Content: Thought content normal.         Judgment: Judgment normal.         Assessment/Plan   Diagnoses and all orders for this visit:    1. Mixed hyperlipidemia (Primary)  -     Comprehensive metabolic panel  -     Lipid Panel With / Chol / HDL Ratio  -     Hepatitis C Antibody    2. Hormone replacement therapy  -     Comprehensive metabolic panel  -     Lipid Panel With / Chol / HDL Ratio  -     Hepatitis C Antibody      We discussed covid 19 virus       "     Orders Placed This Encounter   Procedures   • Comprehensive metabolic panel   • Lipid Panel With / Chol / HDL Ratio   • Hepatitis C Antibody       Follow up: 6 month(s)

## 2020-12-04 LAB
ALBUMIN SERPL-MCNC: 4.1 G/DL (ref 3.5–5.2)
ALBUMIN/GLOB SERPL: 1.8 G/DL
ALP SERPL-CCNC: 68 U/L (ref 39–117)
ALT SERPL-CCNC: 18 U/L (ref 1–33)
AST SERPL-CCNC: 18 U/L (ref 1–32)
BILIRUB SERPL-MCNC: 0.4 MG/DL (ref 0–1.2)
BUN SERPL-MCNC: 12 MG/DL (ref 8–23)
BUN/CREAT SERPL: 17.6 (ref 7–25)
CALCIUM SERPL-MCNC: 8.9 MG/DL (ref 8.6–10.5)
CHLORIDE SERPL-SCNC: 101 MMOL/L (ref 98–107)
CHOLEST SERPL-MCNC: 192 MG/DL (ref 0–200)
CHOLEST/HDLC SERPL: 2.82 {RATIO}
CO2 SERPL-SCNC: 32.1 MMOL/L (ref 22–29)
CREAT SERPL-MCNC: 0.68 MG/DL (ref 0.57–1)
GLOBULIN SER CALC-MCNC: 2.3 GM/DL
GLUCOSE SERPL-MCNC: 101 MG/DL (ref 65–99)
HCV AB S/CO SERPL IA: <0.1 S/CO RATIO (ref 0–0.9)
HDLC SERPL-MCNC: 68 MG/DL (ref 40–60)
LDLC SERPL CALC-MCNC: 107 MG/DL (ref 0–100)
POTASSIUM SERPL-SCNC: 4 MMOL/L (ref 3.5–5.2)
PROT SERPL-MCNC: 6.4 G/DL (ref 6–8.5)
SODIUM SERPL-SCNC: 141 MMOL/L (ref 136–145)
TRIGL SERPL-MCNC: 98 MG/DL (ref 0–150)
VLDLC SERPL CALC-MCNC: 17 MG/DL (ref 5–40)

## 2020-12-22 RX ORDER — ESTRADIOL 0.05 MG/D
1 PATCH TRANSDERMAL WEEKLY
Qty: 4 PATCH | Refills: 5 | Status: CANCELLED | OUTPATIENT
Start: 2020-12-22

## 2020-12-22 RX ORDER — ESTRADIOL 0.05 MG/D
1 PATCH TRANSDERMAL WEEKLY
Qty: 4 PATCH | Refills: 5 | Status: SHIPPED | OUTPATIENT
Start: 2020-12-22 | End: 2020-12-23 | Stop reason: SDUPTHER

## 2020-12-22 RX ORDER — ROSUVASTATIN CALCIUM 10 MG/1
10 TABLET, COATED ORAL DAILY
Qty: 90 TABLET | Refills: 1 | Status: SHIPPED | OUTPATIENT
Start: 2020-12-22 | End: 2021-06-03 | Stop reason: SDUPTHER

## 2020-12-22 NOTE — TELEPHONE ENCOUNTER
Printer was out of script paper. Kandace got script for me. Patient notified  She can pick scripts up at office

## 2020-12-22 NOTE — TELEPHONE ENCOUNTER
Needs refill on    rosuvastatin (CRESTOR) 10 MG tablet    estradiol (Climara) 0.05 MG/24HR patch    6    months supply    She wants to pick it up today 12-22-20    Wants a written script

## 2020-12-23 RX ORDER — ESTRADIOL 0.05 MG/D
1 PATCH TRANSDERMAL WEEKLY
Qty: 12 PATCH | Refills: 1 | Status: SHIPPED | OUTPATIENT
Start: 2020-12-23 | End: 2021-06-03 | Stop reason: SDUPTHER

## 2020-12-23 NOTE — TELEPHONE ENCOUNTER
Caller: Lavinia Aguilar    Relationship: Self    Best call back number:estradiol (Climara) 0.05 MG/24HR patch    Medication needed:   Requested Prescriptions     Pending Prescriptions Disp Refills   • estradiol (Climara) 0.05 MG/24HR patch 4 patch 5     Sig: Place 1 patch on the skin as directed by provider 1 (One) Time Per Week.       When do you need the refill by: 12/23/2020    What details did the patient provide when requesting the medication: patient called stating that she was not sent the full amount of her patches. She was only given four when she needs twelve.    Does the patient have less than a 3 day supply:  [x] Yes  [] No    What is the patient's preferred pharmacy: [facility] - [place], [place] - [address] AT PORTAL TO [facility] - [phone]  - [phone] FX

## 2020-12-28 RX ORDER — ESTRADIOL 0.05 MG/D
1 PATCH TRANSDERMAL WEEKLY
Qty: 12 PATCH | Refills: 1 | Status: CANCELLED | OUTPATIENT
Start: 2020-12-28 | End: 2021-03-28

## 2020-12-29 RX ORDER — ESTRADIOL 0.05 MG/D
1 PATCH TRANSDERMAL WEEKLY
Qty: 12 PATCH | Refills: 1 | Status: CANCELLED | OUTPATIENT
Start: 2020-12-29 | End: 2021-03-29

## 2021-01-04 ENCOUNTER — TELEPHONE (OUTPATIENT)
Dept: FAMILY MEDICINE CLINIC | Facility: CLINIC | Age: 76
End: 2021-01-04

## 2021-01-04 NOTE — TELEPHONE ENCOUNTER
PATIENT CALLED IN REQUESTING A CALL BACK IN REGARDS TO SOME MEDICATIONS THAT WERE TO BE SENT IN DURING THE HOLIDAYS  SHE WOULD JUST LIKE TO BE INFORMED IF ALL NEEDED INFORMATION WAS RECEIVED IF THERE IS ANY INFORMATION SHE NEEDS TO GIVE CLINICAL STAFF  AND IF THE MEDICATIONS HAVE BEEN SENT TO THE CORRECT PHARMACY      PLEASE CALL BACK AT   [phone]

## 2021-01-13 ENCOUNTER — TELEPHONE (OUTPATIENT)
Dept: FAMILY MEDICINE CLINIC | Facility: CLINIC | Age: 76
End: 2021-01-13

## 2021-01-13 NOTE — TELEPHONE ENCOUNTER
PATIENT CALLED STATING HER PRESCRIPTION THAT SHE WAS GIVEN FOR HER estradiol (Climara) 0.05 MG/24HR patch WAS WRITTEN INCORRECTLY.      PLEASE CALL AND ADVISE  [phone]

## 2021-04-09 ENCOUNTER — TELEPHONE (OUTPATIENT)
Dept: FAMILY MEDICINE CLINIC | Facility: CLINIC | Age: 76
End: 2021-04-09

## 2021-04-09 NOTE — TELEPHONE ENCOUNTER
"Caller: Lavinia Aguilar    Relationship: Self    Best call back number:  [phone]     What is the best time to reach you: ANY TIME     Who are you requesting to speak with (clinical staff, provider,  specific staff member): CLINICAL STAFF      What was the call regarding: PATIENT CALLED TO ADVISE THAT HER \"YEARLY BREAST SCAN\" WAS CLEAR. PATIENT WAS WISHING TO DISCUSS THIS WITH A NURSE OR .       "

## 2021-06-03 ENCOUNTER — OFFICE VISIT (OUTPATIENT)
Dept: FAMILY MEDICINE CLINIC | Facility: CLINIC | Age: 76
End: 2021-06-03

## 2021-06-03 VITALS
DIASTOLIC BLOOD PRESSURE: 64 MMHG | SYSTOLIC BLOOD PRESSURE: 122 MMHG | HEIGHT: 63 IN | HEART RATE: 74 BPM | WEIGHT: 104 LBS | RESPIRATION RATE: 16 BRPM | BODY MASS INDEX: 18.43 KG/M2 | TEMPERATURE: 98.5 F

## 2021-06-03 DIAGNOSIS — R05.9 COUGH: ICD-10-CM

## 2021-06-03 DIAGNOSIS — R63.4 WEIGHT LOSS: ICD-10-CM

## 2021-06-03 DIAGNOSIS — E78.2 MIXED HYPERLIPIDEMIA: Primary | ICD-10-CM

## 2021-06-03 PROCEDURE — 99213 OFFICE O/P EST LOW 20 MIN: CPT | Performed by: FAMILY MEDICINE

## 2021-06-03 RX ORDER — ROSUVASTATIN CALCIUM 10 MG/1
10 TABLET, COATED ORAL DAILY
Qty: 90 TABLET | Refills: 1 | Status: SHIPPED | OUTPATIENT
Start: 2021-06-03 | End: 2021-06-03 | Stop reason: SDUPTHER

## 2021-06-03 RX ORDER — ROSUVASTATIN CALCIUM 10 MG/1
10 TABLET, COATED ORAL DAILY
Qty: 90 TABLET | Refills: 1 | Status: SHIPPED | OUTPATIENT
Start: 2021-06-03 | End: 2021-06-10 | Stop reason: SDUPTHER

## 2021-06-03 RX ORDER — ESTRADIOL 0.05 MG/D
1 PATCH TRANSDERMAL WEEKLY
Qty: 12 PATCH | Refills: 1 | Status: SHIPPED | OUTPATIENT
Start: 2021-06-03 | End: 2021-06-10 | Stop reason: SDUPTHER

## 2021-06-03 NOTE — TELEPHONE ENCOUNTER
Caller: Lauren Lavinia J    Relationship: Self    Best call back number: [phone]    Medication needed:   Requested Prescriptions     Pending Prescriptions Disp Refills   • estradiol (Climara) 0.05 MG/24HR patch 12 patch 1     Sig: Place 1 patch on the skin as directed by provider 1 (One) Time Per Week for 90 days.   • rosuvastatin (CRESTOR) 10 MG tablet 90 tablet 1     Sig: Take 1 tablet by mouth Daily.       When do you need the refill by: 06/03/21    What additional details did the patient provide when requesting the medication: PATCHES NEED TO BE FOR 6 MONTHS    Does the patient have less than a 3 day supply:  [x] Yes  [] No    What is the patient's preferred pharmacy:   [facility] - [place], [place] - [address] AT Portal to [facility] - [phone]  - [phone]

## 2021-06-03 NOTE — PROGRESS NOTES
Subjective   Lavinia Aguilar is a 75 y.o. female.     Chief Complaint   Patient presents with   • Hyperlipidemia       History of Present Illness     sheis tolerating statin without myalgias ---she finds it difficult to gain weight      Current Outpatient Medications:   •  Calcium-Vitamin D 500-125 MG-UNIT tablet, Take 1 tablet by mouth Daily., Disp: , Rfl:   •  Multiple Vitamins-Minerals (MULTIVITAMIN ADULT PO), Take 1 tablet by mouth Daily., Disp: , Rfl:   •  rosuvastatin (CRESTOR) 10 MG tablet, Take 1 tablet by mouth Daily., Disp: 90 tablet, Rfl: 1  •  estradiol (Climara) 0.05 MG/24HR patch, Place 1 patch on the skin as directed by provider 1 (One) Time Per Week for 90 days., Disp: 12 patch, Rfl: 1  No Known Allergies    Past Medical History:   Diagnosis Date   • Bone/cartilage disorder    • Hyperlipidemia    • Left rotator cuff tear    • PONV (postoperative nausea and vomiting)      Past Surgical History:   Procedure Laterality Date   • CARPAL TUNNEL RELEASE     • CERVICAL SPINE SURGERY     • COLONOSCOPY     • COLONOSCOPY  06/19/2013    internal hemorrhoid   • COLONOSCOPY N/A 7/11/2018    Procedure: COLONOSCOPY WITH ANESTHESIA;  Surgeon: Andrew Palm MD;  Location: [facility] ENDOSCOPY;  Service: Gastroenterology   • HAMMER TOE REPAIR     • HYSTERECTOMY     • INSERTION / REMOVAL CRANIAL DBS GENERATOR     • KNEE CARTILAGE SURGERY     • NASAL SEPTUM SURGERY     • RECTAL SURGERY      rectocele   • SHOULDER ROTATOR CUFF REPAIR Left 02/2018   • SHOULDER SURGERY Right    • TONSILLECTOMY     • TUBAL ABDOMINAL LIGATION         Review of Systems   Constitutional: Positive for unexpected weight change.   HENT: Negative.    Eyes: Negative.    Respiratory: Negative.    Cardiovascular: Negative.    Gastrointestinal: Negative.    Endocrine: Negative.    Genitourinary: Negative.    Musculoskeletal: Negative.    Skin: Negative.    Allergic/Immunologic: Negative.    Neurological: Negative.    Hematological: Negative.   "  Psychiatric/Behavioral: Negative.        Objective  /64   Pulse 74   Temp 98.5 °F (36.9 °C)   Resp 16   Ht 160 cm (62.99\")   Wt 47.2 kg (104 lb)   BMI 18.43 kg/m²   Physical Exam  Vitals and nursing note reviewed.   Constitutional:       Appearance: Normal appearance.   HENT:      Head: Normocephalic and atraumatic.      Nose: Nose normal.      Mouth/Throat:      Mouth: Mucous membranes are moist.   Eyes:      Extraocular Movements: Extraocular movements intact.      Pupils: Pupils are equal, round, and reactive to light.   Cardiovascular:      Rate and Rhythm: Normal rate and regular rhythm.      Pulses: Normal pulses.      Heart sounds: Normal heart sounds.   Pulmonary:      Effort: Pulmonary effort is normal.   Abdominal:      General: Abdomen is flat. Bowel sounds are normal.   Musculoskeletal:         General: Normal range of motion.      Cervical back: Normal range of motion and neck supple.   Skin:     General: Skin is warm and dry.      Capillary Refill: Capillary refill takes less than 2 seconds.   Neurological:      General: No focal deficit present.      Mental Status: She is alert and oriented to person, place, and time. Mental status is at baseline.   Psychiatric:         Mood and Affect: Mood normal.         Behavior: Behavior normal.         Thought Content: Thought content normal.         Judgment: Judgment normal.         Assessment/Plan   Diagnoses and all orders for this visit:    1. Mixed hyperlipidemia (Primary)  -     CBC & Differential  -     Comprehensive metabolic panel  -     TSH  -     T4, free  -     Urinalysis without microscopic (no culture) - Urine, Clean Catch  -     CT Chest Without Contrast  -     CT Abdomen Pelvis Without Contrast    2. Weight loss  -     CBC & Differential  -     Comprehensive metabolic panel  -     TSH  -     T4, free  -     Urinalysis without microscopic (no culture) - Urine, Clean Catch  -     CT Chest Without Contrast  -     CT Abdomen Pelvis " Without Contrast    3. Cough  -     CT Chest Without Contrast  -     CT Abdomen Pelvis Without Contrast                 Orders Placed This Encounter   Procedures   • CT Chest Without Contrast     Order Specific Question:   Release to patient     Answer:   Immediate   • CT Abdomen Pelvis Without Contrast     Order Specific Question:   Will Oral Contrast be needed for this procedure?     Answer:   No     Order Specific Question:   Release to patient     Answer:   Immediate   • Comprehensive metabolic panel     Order Specific Question:   Release to patient     Answer:   Immediate   • TSH     Order Specific Question:   Release to patient     Answer:   Immediate   • T4, free     Order Specific Question:   Release to patient     Answer:   Immediate   • Urinalysis without microscopic (no culture) - Urine, Clean Catch     Order Specific Question:   Release to patient     Answer:   Immediate   • CBC & Differential       Follow up: 4 week(s)

## 2021-06-04 LAB
ALBUMIN SERPL-MCNC: 4.3 G/DL (ref 3.5–5.2)
ALBUMIN/GLOB SERPL: 1.8 G/DL
ALP SERPL-CCNC: 72 U/L (ref 39–117)
ALT SERPL-CCNC: 14 U/L (ref 1–33)
APPEARANCE UR: ABNORMAL
AST SERPL-CCNC: 20 U/L (ref 1–32)
BASOPHILS # BLD AUTO: 0.02 10*3/MM3 (ref 0–0.2)
BASOPHILS NFR BLD AUTO: 0.4 % (ref 0–1.5)
BILIRUB SERPL-MCNC: 0.4 MG/DL (ref 0–1.2)
BILIRUB UR QL STRIP: NEGATIVE
BUN SERPL-MCNC: 12 MG/DL (ref 8–23)
BUN/CREAT SERPL: 17.4 (ref 7–25)
CALCIUM SERPL-MCNC: 9.4 MG/DL (ref 8.6–10.5)
CHLORIDE SERPL-SCNC: 102 MMOL/L (ref 98–107)
CO2 SERPL-SCNC: 32.8 MMOL/L (ref 22–29)
COLOR UR: YELLOW
CREAT SERPL-MCNC: 0.69 MG/DL (ref 0.57–1)
EOSINOPHIL # BLD AUTO: 0.09 10*3/MM3 (ref 0–0.4)
EOSINOPHIL NFR BLD AUTO: 1.6 % (ref 0.3–6.2)
ERYTHROCYTE [DISTWIDTH] IN BLOOD BY AUTOMATED COUNT: 11.8 % (ref 12.3–15.4)
GLOBULIN SER CALC-MCNC: 2.4 GM/DL
GLUCOSE SERPL-MCNC: 112 MG/DL (ref 65–99)
GLUCOSE UR QL: NEGATIVE
HCT VFR BLD AUTO: 44.5 % (ref 34–46.6)
HGB BLD-MCNC: 14.7 G/DL (ref 12–15.9)
HGB UR QL STRIP: NEGATIVE
IMM GRANULOCYTES # BLD AUTO: 0.01 10*3/MM3 (ref 0–0.05)
IMM GRANULOCYTES NFR BLD AUTO: 0.2 % (ref 0–0.5)
KETONES UR QL STRIP: ABNORMAL
LEUKOCYTE ESTERASE UR QL STRIP: ABNORMAL
LYMPHOCYTES # BLD AUTO: 1.88 10*3/MM3 (ref 0.7–3.1)
LYMPHOCYTES NFR BLD AUTO: 33.6 % (ref 19.6–45.3)
MCH RBC QN AUTO: 32.2 PG (ref 26.6–33)
MCHC RBC AUTO-ENTMCNC: 33 G/DL (ref 31.5–35.7)
MCV RBC AUTO: 97.6 FL (ref 79–97)
MONOCYTES # BLD AUTO: 0.36 10*3/MM3 (ref 0.1–0.9)
MONOCYTES NFR BLD AUTO: 6.4 % (ref 5–12)
NEUTROPHILS # BLD AUTO: 3.23 10*3/MM3 (ref 1.7–7)
NEUTROPHILS NFR BLD AUTO: 57.8 % (ref 42.7–76)
NITRITE UR QL STRIP: POSITIVE
NRBC BLD AUTO-RTO: 0 /100 WBC (ref 0–0.2)
PH UR STRIP: 7.5 [PH] (ref 5–8)
PLATELET # BLD AUTO: 214 10*3/MM3 (ref 140–450)
POTASSIUM SERPL-SCNC: 4.2 MMOL/L (ref 3.5–5.2)
PROT SERPL-MCNC: 6.7 G/DL (ref 6–8.5)
PROT UR QL STRIP: ABNORMAL
RBC # BLD AUTO: 4.56 10*6/MM3 (ref 3.77–5.28)
SODIUM SERPL-SCNC: 142 MMOL/L (ref 136–145)
SP GR UR: 1.02 (ref 1–1.03)
T4 FREE SERPL-MCNC: 1.14 NG/DL (ref 0.93–1.7)
TSH SERPL DL<=0.005 MIU/L-ACNC: 3.01 UIU/ML (ref 0.27–4.2)
UROBILINOGEN UR STRIP-MCNC: ABNORMAL MG/DL
WBC # BLD AUTO: 5.59 10*3/MM3 (ref 3.4–10.8)

## 2021-06-10 RX ORDER — ESTRADIOL 0.05 MG/D
1 PATCH TRANSDERMAL WEEKLY
Qty: 12 PATCH | Refills: 1 | Status: SHIPPED | OUTPATIENT
Start: 2021-06-10 | End: 2021-06-14 | Stop reason: HOSPADM

## 2021-06-10 RX ORDER — ROSUVASTATIN CALCIUM 10 MG/1
10 TABLET, COATED ORAL DAILY
Qty: 90 TABLET | Refills: 1 | Status: SHIPPED | OUTPATIENT
Start: 2021-06-10 | End: 2021-06-14 | Stop reason: HOSPADM

## 2021-06-12 ENCOUNTER — HOSPITAL ENCOUNTER (OUTPATIENT)
Facility: HOSPITAL | Age: 76
Setting detail: OBSERVATION
Discharge: HOME | End: 2021-06-14
Attending: EMERGENCY MEDICINE | Admitting: FAMILY MEDICINE

## 2021-06-12 ENCOUNTER — APPOINTMENT (OUTPATIENT)
Dept: GENERAL RADIOLOGY | Facility: HOSPITAL | Age: 76
End: 2021-06-12

## 2021-06-12 ENCOUNTER — APPOINTMENT (OUTPATIENT)
Dept: CT IMAGING | Facility: HOSPITAL | Age: 76
End: 2021-06-12

## 2021-06-12 DIAGNOSIS — R13.10 DYSPHAGIA, UNSPECIFIED TYPE: ICD-10-CM

## 2021-06-12 DIAGNOSIS — R53.1 ACUTE RIGHT-SIDED WEAKNESS: Primary | ICD-10-CM

## 2021-06-12 DIAGNOSIS — G45.9 TIA (TRANSIENT ISCHEMIC ATTACK): ICD-10-CM

## 2021-06-12 LAB
ABO GROUP BLD: NORMAL
ALBUMIN SERPL-MCNC: 4.1 G/DL (ref 3.5–5.2)
ALBUMIN/GLOB SERPL: 1.4 G/DL
ALP SERPL-CCNC: 76 U/L (ref 39–117)
ALT SERPL W P-5'-P-CCNC: 15 U/L (ref 1–33)
ANION GAP SERPL CALCULATED.3IONS-SCNC: 7 MMOL/L (ref 5–15)
APTT PPP: 32.3 SECONDS (ref 24.1–35)
AST SERPL-CCNC: 20 U/L (ref 1–32)
BASOPHILS # BLD AUTO: 0.02 10*3/MM3 (ref 0–0.2)
BASOPHILS NFR BLD AUTO: 0.3 % (ref 0–1.5)
BILIRUB SERPL-MCNC: <0.2 MG/DL (ref 0–1.2)
BLD GP AB SCN SERPL QL: NEGATIVE
BUN SERPL-MCNC: 15 MG/DL (ref 8–23)
BUN/CREAT SERPL: 22.1 (ref 7–25)
CALCIUM SPEC-SCNC: 9.4 MG/DL (ref 8.6–10.5)
CHLORIDE SERPL-SCNC: 102 MMOL/L (ref 98–107)
CO2 SERPL-SCNC: 30 MMOL/L (ref 22–29)
CREAT SERPL-MCNC: 0.68 MG/DL (ref 0.57–1)
DEPRECATED RDW RBC AUTO: 45.4 FL (ref 37–54)
EOSINOPHIL # BLD AUTO: 0.13 10*3/MM3 (ref 0–0.4)
EOSINOPHIL NFR BLD AUTO: 1.9 % (ref 0.3–6.2)
ERYTHROCYTE [DISTWIDTH] IN BLOOD BY AUTOMATED COUNT: 12.8 % (ref 12.3–15.4)
GFR SERPL CREATININE-BSD FRML MDRD: 84 ML/MIN/1.73
GLOBULIN UR ELPH-MCNC: 2.9 GM/DL
GLUCOSE BLDC GLUCOMTR-MCNC: 85 MG/DL (ref 70–130)
GLUCOSE SERPL-MCNC: 100 MG/DL (ref 65–99)
HCT VFR BLD AUTO: 40.8 % (ref 34–46.6)
HGB BLD-MCNC: 13.5 G/DL (ref 12–15.9)
HOLD SPECIMEN: NORMAL
HOLD SPECIMEN: NORMAL
IMM GRANULOCYTES # BLD AUTO: 0.01 10*3/MM3 (ref 0–0.05)
IMM GRANULOCYTES NFR BLD AUTO: 0.1 % (ref 0–0.5)
INR PPP: 0.96 (ref 0.91–1.09)
LYMPHOCYTES # BLD AUTO: 2.23 10*3/MM3 (ref 0.7–3.1)
LYMPHOCYTES NFR BLD AUTO: 31.9 % (ref 19.6–45.3)
MCH RBC QN AUTO: 31.8 PG (ref 26.6–33)
MCHC RBC AUTO-ENTMCNC: 33.1 G/DL (ref 31.5–35.7)
MCV RBC AUTO: 96.2 FL (ref 79–97)
MONOCYTES # BLD AUTO: 0.54 10*3/MM3 (ref 0.1–0.9)
MONOCYTES NFR BLD AUTO: 7.7 % (ref 5–12)
NEUTROPHILS NFR BLD AUTO: 4.06 10*3/MM3 (ref 1.7–7)
NEUTROPHILS NFR BLD AUTO: 58.1 % (ref 42.7–76)
NRBC BLD AUTO-RTO: 0 /100 WBC (ref 0–0.2)
PLATELET # BLD AUTO: 265 10*3/MM3 (ref 140–450)
PMV BLD AUTO: 9.9 FL (ref 6–12)
POTASSIUM SERPL-SCNC: 4.2 MMOL/L (ref 3.5–5.2)
PROT SERPL-MCNC: 7 G/DL (ref 6–8.5)
PROTHROMBIN TIME: 12 SECONDS (ref 11.5–13.4)
RBC # BLD AUTO: 4.24 10*6/MM3 (ref 3.77–5.28)
RH BLD: NEGATIVE
SARS-COV-2 RNA PNL SPEC NAA+PROBE: NOT DETECTED
SODIUM SERPL-SCNC: 139 MMOL/L (ref 136–145)
T&S EXPIRATION DATE: NORMAL
TROPONIN T SERPL-MCNC: <0.01 NG/ML (ref 0–0.03)
WBC # BLD AUTO: 6.99 10*3/MM3 (ref 3.4–10.8)
WHOLE BLOOD HOLD SPECIMEN: NORMAL

## 2021-06-12 PROCEDURE — 86901 BLOOD TYPING SEROLOGIC RH(D): CPT | Performed by: EMERGENCY MEDICINE

## 2021-06-12 PROCEDURE — 93010 ELECTROCARDIOGRAM REPORT: CPT | Performed by: INTERNAL MEDICINE

## 2021-06-12 PROCEDURE — 71045 X-RAY EXAM CHEST 1 VIEW: CPT

## 2021-06-12 PROCEDURE — 93005 ELECTROCARDIOGRAM TRACING: CPT | Performed by: FAMILY MEDICINE

## 2021-06-12 PROCEDURE — 70498 CT ANGIOGRAPHY NECK: CPT

## 2021-06-12 PROCEDURE — C9803 HOPD COVID-19 SPEC COLLECT: HCPCS

## 2021-06-12 PROCEDURE — G0378 HOSPITAL OBSERVATION PER HR: HCPCS

## 2021-06-12 PROCEDURE — 87635 SARS-COV-2 COVID-19 AMP PRB: CPT | Performed by: EMERGENCY MEDICINE

## 2021-06-12 PROCEDURE — 0042T HC CT CEREBRAL PERFUSION W/WO CONTRAST: CPT

## 2021-06-12 PROCEDURE — 85610 PROTHROMBIN TIME: CPT | Performed by: EMERGENCY MEDICINE

## 2021-06-12 PROCEDURE — 93005 ELECTROCARDIOGRAM TRACING: CPT | Performed by: EMERGENCY MEDICINE

## 2021-06-12 PROCEDURE — 70450 CT HEAD/BRAIN W/O DYE: CPT

## 2021-06-12 PROCEDURE — 82962 GLUCOSE BLOOD TEST: CPT

## 2021-06-12 PROCEDURE — 70496 CT ANGIOGRAPHY HEAD: CPT

## 2021-06-12 PROCEDURE — 86900 BLOOD TYPING SEROLOGIC ABO: CPT | Performed by: EMERGENCY MEDICINE

## 2021-06-12 PROCEDURE — 86850 RBC ANTIBODY SCREEN: CPT | Performed by: EMERGENCY MEDICINE

## 2021-06-12 PROCEDURE — 85025 COMPLETE CBC W/AUTO DIFF WBC: CPT | Performed by: EMERGENCY MEDICINE

## 2021-06-12 PROCEDURE — 99284 EMERGENCY DEPT VISIT MOD MDM: CPT

## 2021-06-12 PROCEDURE — 80053 COMPREHEN METABOLIC PANEL: CPT | Performed by: EMERGENCY MEDICINE

## 2021-06-12 PROCEDURE — 84484 ASSAY OF TROPONIN QUANT: CPT | Performed by: EMERGENCY MEDICINE

## 2021-06-12 PROCEDURE — 99218 PR INITIAL OBSERVATION CARE/DAY 30 MINUTES: CPT | Performed by: FAMILY MEDICINE

## 2021-06-12 PROCEDURE — 0 IOPAMIDOL PER 1 ML: Performed by: EMERGENCY MEDICINE

## 2021-06-12 PROCEDURE — 85730 THROMBOPLASTIN TIME PARTIAL: CPT | Performed by: EMERGENCY MEDICINE

## 2021-06-12 RX ORDER — SODIUM CHLORIDE 0.9 % (FLUSH) 0.9 %
10 SYRINGE (ML) INJECTION EVERY 12 HOURS SCHEDULED
Status: DISCONTINUED | OUTPATIENT
Start: 2021-06-12 | End: 2021-06-14 | Stop reason: HOSPADM

## 2021-06-12 RX ORDER — SODIUM CHLORIDE 0.9 % (FLUSH) 0.9 %
10 SYRINGE (ML) INJECTION AS NEEDED
Status: DISCONTINUED | OUTPATIENT
Start: 2021-06-12 | End: 2021-06-14 | Stop reason: HOSPADM

## 2021-06-12 RX ORDER — ASPIRIN 81 MG/1
81 TABLET, CHEWABLE ORAL DAILY
Status: DISCONTINUED | OUTPATIENT
Start: 2021-06-13 | End: 2021-06-14 | Stop reason: HOSPADM

## 2021-06-12 RX ORDER — ROSUVASTATIN CALCIUM 10 MG/1
10 TABLET, COATED ORAL DAILY
Status: DISCONTINUED | OUTPATIENT
Start: 2021-06-13 | End: 2021-06-13

## 2021-06-12 RX ORDER — ASPIRIN 300 MG/1
300 SUPPOSITORY RECTAL DAILY
Status: DISCONTINUED | OUTPATIENT
Start: 2021-06-13 | End: 2021-06-14 | Stop reason: HOSPADM

## 2021-06-12 RX ORDER — SODIUM CHLORIDE 9 MG/ML
50 INJECTION, SOLUTION INTRAVENOUS CONTINUOUS
Status: DISCONTINUED | OUTPATIENT
Start: 2021-06-12 | End: 2021-06-13

## 2021-06-12 RX ADMIN — IOPAMIDOL 125 ML: 755 INJECTION, SOLUTION INTRAVENOUS at 15:42

## 2021-06-12 RX ADMIN — SODIUM CHLORIDE 50 ML/HR: 9 INJECTION, SOLUTION INTRAVENOUS at 20:41

## 2021-06-12 RX ADMIN — SODIUM CHLORIDE, PRESERVATIVE FREE 10 ML: 5 INJECTION INTRAVENOUS at 20:41

## 2021-06-12 NOTE — ED PROVIDER NOTES
Subjective   This is a very pleasant 76-year-old lady with a past medical history of hyperlipidemia, basal ganglia and plan due to tremors who presents the emergency department with a speech problem.  Patient states at 1:30 PM she noted she had a speech problem.  She is unsure if this was the exact time of onset.  Her  states he last saw her at noon.  She can onset at 130 with speech difficulties.  He states she had slurred speech and he was concerned she had a stroke.  This was sudden onset, constant, moderate, no exacerbating relieving factors and no associated symptoms.  She denies any headache or vision changes.  She has no numbness or weakness.  No chest pain, shortness of breath, abdominal pain, nausea, vomiting, fevers, or chills.    Past medical history: Hyperlipidemia  Social history: No illicit drug use      History provided by:  Patient and significant other      Review of Systems   All other systems reviewed and are negative.      Past Medical History:   Diagnosis Date   • Bone/cartilage disorder    • Hyperlipidemia    • Left rotator cuff tear    • PONV (postoperative nausea and vomiting)        No Known Allergies    Past Surgical History:   Procedure Laterality Date   • CARPAL TUNNEL RELEASE     • CERVICAL SPINE SURGERY     • COLONOSCOPY     • COLONOSCOPY  06/19/2013    internal hemorrhoid   • COLONOSCOPY N/A 7/11/2018    Procedure: COLONOSCOPY WITH ANESTHESIA;  Surgeon: Andrew Palm MD;  Location: [facility] ENDOSCOPY;  Service: Gastroenterology   • HAMMER TOE REPAIR     • HYSTERECTOMY     • INSERTION / REMOVAL CRANIAL DBS GENERATOR     • KNEE CARTILAGE SURGERY     • NASAL SEPTUM SURGERY     • RECTAL SURGERY      rectocele   • SHOULDER ROTATOR CUFF REPAIR Left 02/2018   • SHOULDER SURGERY Right    • TONSILLECTOMY     • TUBAL ABDOMINAL LIGATION         Family History   Problem Relation Age of Onset   • Colon polyps Mother    • Colon cancer Neg Hx        Social History     Socioeconomic History    • Marital status:      Spouse name: Not on file   • Number of children: Not on file   • Years of education: Not on file   • Highest education level: Not on file   Tobacco Use   • Smoking status: Never Smoker   • Smokeless tobacco: Never Used   Substance and Sexual Activity   • Alcohol use: No   • Drug use: No   • Sexual activity: Defer           Objective   Physical Exam  Vitals and nursing note reviewed.   Constitutional:       General: She is not in acute distress.     Appearance: Normal appearance. She is normal weight. She is not ill-appearing, toxic-appearing or diaphoretic.   HENT:      Head: Normocephalic and atraumatic.      Nose: Nose normal.      Mouth/Throat:      Mouth: Mucous membranes are moist.   Eyes:      Extraocular Movements: Extraocular movements intact.   Cardiovascular:      Rate and Rhythm: Normal rate and regular rhythm.      Pulses: Normal pulses.      Heart sounds: Normal heart sounds. No murmur heard.   No friction rub. No gallop.    Pulmonary:      Effort: Pulmonary effort is normal. No respiratory distress.      Breath sounds: Normal breath sounds. No stridor. No wheezing, rhonchi or rales.   Abdominal:      General: Abdomen is flat. Bowel sounds are normal. There is no distension.      Palpations: There is no mass.      Tenderness: There is no abdominal tenderness. There is no guarding or rebound.      Hernia: No hernia is present.   Musculoskeletal:         General: No swelling or tenderness. Normal range of motion.      Cervical back: Normal range of motion and neck supple.   Skin:     General: Skin is warm and dry.      Capillary Refill: Capillary refill takes less than 2 seconds.      Coloration: Skin is not jaundiced or pale.      Findings: No bruising, erythema, lesion or rash.   Neurological:      Mental Status: She is alert and oriented to person, place, and time.      Comments: Patient is alert and keenly responsive, oriented to month and age, able to blink eyes and  squeeze hands, horizontal extraocular movements are intact, there is no visual field loss, there is minor paralysis of the right side of the face with a flattened nasolabial fold and asymmetric smile.  There is no left arm motor drift, no right arm motor drift, no left leg motor drift, no right leg motor drift, no limb ataxia, there is mild sensation loss of the right arm, but there is no aphasia, there is mild dysarthria, she is slurring but can be understood, there is no extinction or inattention.  NIH stroke scale is 3.   Psychiatric:         Mood and Affect: Mood normal.         Behavior: Behavior normal.         Thought Content: Thought content normal.         Judgment: Judgment normal.         Procedures           ED Course  ED Course as of Jun 12 1730   Sat Jun 12, 2021   1529 NIHSS of 3. Slurred speech, RS facial droop, RS arm weakness      [CF]   1529 Last definite known normal was 1200    [CF]   1617 Symptoms improving upon re-evaluation so will not give TPA.     [CF]   1701 Patient re-evaluated again, symptom free at this time.     [CF]      ED Course User Index  [CF] Bernardo Bajwa MD                                           MDM  Number of Diagnoses or Management Options  Acute right-sided weakness: new and requires workup  TIA (transient ischemic attack): new and requires workup  Diagnosis management comments: Patient presents with right-sided weakness.  Upon arrival in no acute distress.  Vital signs remarkable for mild hypertension.  Stroke alert was called.  Patient undergoes CT scan of the head as well as CT angiogram of the head and neck.  Broad labs are sent.  Labs reviewed by me with an unremarkable CBC, CMP with no gross electrolyte abnormalities, no signs of kidney injury, no elevation anion gap, chest x-ray obtained which shows no acute findings.  CT angiogram the head and neck overall reassuring.  I discussed the case with Dr. Lee.  Patient has a last known normal time of  noon and did not present here until 330 so would be outside the 3-hour window and end of the 4-1/2-hour window.  Symptoms are actively improving and we feel the risks outweigh the benefits of TPA.  Patient has completely asymptomatic upon my third evaluation of her.  While she was still symptomatic I discussed the risks and benefits of TPA with her  and he agreed with my recommended plan of not pursuing TPA at this time.  Patient has reassuring imaging at this time and presentation is most consistent with a TIA.  I discussed the case with Dr. Kraus and the patient has been admitted in stable condition.       Amount and/or Complexity of Data Reviewed  Clinical lab tests: ordered and reviewed  Tests in the radiology section of CPT®: ordered and reviewed  Discuss the patient with other providers: yes (Dr. Lee and Dr. Kraus. )    Risk of Complications, Morbidity, and/or Mortality  Presenting problems: high  Diagnostic procedures: high  Management options: high    Patient Progress  Patient progress: improved      Final diagnoses:   Acute right-sided weakness   TIA (transient ischemic attack)       ED Disposition  ED Disposition     ED Disposition Condition Comment    Decision to Admit  Level of Care: Telemetry [5]   Diagnosis: Acute right-sided weakness [939482]   Admitting Physician: KAREN KRAUS [[id number]]   Attending Physician: KAREN KRAUS [[id number]]            No follow-up provider specified.       Medication List      No changes were made to your prescriptions during this visit.          Bernardo Bajwa MD  06/12/21 [id number]

## 2021-06-12 NOTE — PLAN OF CARE
Goal Outcome Evaluation:              Outcome Summary: Pt arrived from ED.  AxOx4.  Slight rt  facial droop.  No numbness or tingling verbalized.  Pt up x 1.  Bed alarm and  in place.

## 2021-06-13 ENCOUNTER — APPOINTMENT (OUTPATIENT)
Dept: CARDIOLOGY | Facility: HOSPITAL | Age: 76
End: 2021-06-13

## 2021-06-13 LAB
BH CV ECHO MEAS - AO MAX PG (FULL): 2.2 MMHG
BH CV ECHO MEAS - AO MAX PG: 6 MMHG
BH CV ECHO MEAS - AO MEAN PG (FULL): 1 MMHG
BH CV ECHO MEAS - AO MEAN PG: 3 MMHG
BH CV ECHO MEAS - AO ROOT AREA (BSA CORRECTED): 1.8
BH CV ECHO MEAS - AO ROOT AREA: 5.3 CM^2
BH CV ECHO MEAS - AO ROOT DIAM: 2.6 CM
BH CV ECHO MEAS - AO V2 MAX: 122 CM/SEC
BH CV ECHO MEAS - AO V2 MEAN: 74 CM/SEC
BH CV ECHO MEAS - AO V2 VTI: 21.3 CM
BH CV ECHO MEAS - AVA(I,A): 2.2 CM^2
BH CV ECHO MEAS - AVA(I,D): 2.2 CM^2
BH CV ECHO MEAS - AVA(V,A): 2 CM^2
BH CV ECHO MEAS - AVA(V,D): 2 CM^2
BH CV ECHO MEAS - BSA(HAYCOCK): 1.4 M^2
BH CV ECHO MEAS - BSA: 1.5 M^2
BH CV ECHO MEAS - BZI_BMI: 18.4 KILOGRAMS/M^2
BH CV ECHO MEAS - BZI_METRIC_HEIGHT: 160 CM
BH CV ECHO MEAS - BZI_METRIC_WEIGHT: 47.2 KG
BH CV ECHO MEAS - EDV(CUBED): 72.5 ML
BH CV ECHO MEAS - EDV(MOD-SP4): 53.9 ML
BH CV ECHO MEAS - EDV(TEICH): 77.3 ML
BH CV ECHO MEAS - EF(CUBED): 70.7 %
BH CV ECHO MEAS - EF(MOD-SP4): 73.8 %
BH CV ECHO MEAS - EF(TEICH): 62.8 %
BH CV ECHO MEAS - ESV(CUBED): 21.3 ML
BH CV ECHO MEAS - ESV(MOD-SP4): 14.1 ML
BH CV ECHO MEAS - ESV(TEICH): 28.8 ML
BH CV ECHO MEAS - FS: 33.6 %
BH CV ECHO MEAS - IVS/LVPW: 1.2
BH CV ECHO MEAS - IVSD: 0.96 CM
BH CV ECHO MEAS - LA DIMENSION: 2.9 CM
BH CV ECHO MEAS - LA/AO: 1.1
BH CV ECHO MEAS - LAT PEAK E' VEL: 9.9 CM/SEC
BH CV ECHO MEAS - LV DIASTOLIC VOL/BSA (35-75): 36.8 ML/M^2
BH CV ECHO MEAS - LV MASS(C)D: 115.1 GRAMS
BH CV ECHO MEAS - LV MASS(C)DI: 78.6 GRAMS/M^2
BH CV ECHO MEAS - LV MAX PG: 3.7 MMHG
BH CV ECHO MEAS - LV MEAN PG: 2 MMHG
BH CV ECHO MEAS - LV SYSTOLIC VOL/BSA (12-30): 9.6 ML/M^2
BH CV ECHO MEAS - LV V1 MAX: 96.8 CM/SEC
BH CV ECHO MEAS - LV V1 MEAN: 66.7 CM/SEC
BH CV ECHO MEAS - LV V1 VTI: 18.7 CM
BH CV ECHO MEAS - LVIDD: 4.2 CM
BH CV ECHO MEAS - LVIDS: 2.8 CM
BH CV ECHO MEAS - LVLD AP4: 7.1 CM
BH CV ECHO MEAS - LVLS AP4: 5.3 CM
BH CV ECHO MEAS - LVOT AREA (M): 2.5 CM^2
BH CV ECHO MEAS - LVOT AREA: 2.5 CM^2
BH CV ECHO MEAS - LVOT DIAM: 1.8 CM
BH CV ECHO MEAS - LVPWD: 0.82 CM
BH CV ECHO MEAS - MED PEAK E' VEL: 8.05 CM/SEC
BH CV ECHO MEAS - MV A MAX VEL: 76.6 CM/SEC
BH CV ECHO MEAS - MV DEC TIME: 0.25 SEC
BH CV ECHO MEAS - MV E MAX VEL: 62.1 CM/SEC
BH CV ECHO MEAS - MV E/A: 0.81
BH CV ECHO MEAS - PA MAX PG: 3.2 MMHG
BH CV ECHO MEAS - PA V2 MAX: 89.2 CM/SEC
BH CV ECHO MEAS - RAP SYSTOLE: 5 MMHG
BH CV ECHO MEAS - RVDD: 2.4 CM
BH CV ECHO MEAS - RVSP: 25.3 MMHG
BH CV ECHO MEAS - SI(AO): 77.2 ML/M^2
BH CV ECHO MEAS - SI(CUBED): 35 ML/M^2
BH CV ECHO MEAS - SI(LVOT): 32.5 ML/M^2
BH CV ECHO MEAS - SI(MOD-SP4): 27.2 ML/M^2
BH CV ECHO MEAS - SI(TEICH): 33.1 ML/M^2
BH CV ECHO MEAS - SV(AO): 113.1 ML
BH CV ECHO MEAS - SV(CUBED): 51.3 ML
BH CV ECHO MEAS - SV(LVOT): 47.6 ML
BH CV ECHO MEAS - SV(MOD-SP4): 39.8 ML
BH CV ECHO MEAS - SV(TEICH): 48.5 ML
BH CV ECHO MEAS - TR MAX VEL: 225 CM/SEC
BH CV ECHO MEASUREMENTS AVERAGE E/E' RATIO: 6.92
CHOLEST SERPL-MCNC: 174 MG/DL (ref 0–200)
GLUCOSE BLDC GLUCOMTR-MCNC: 89 MG/DL (ref 70–130)
GLUCOSE BLDC GLUCOMTR-MCNC: 91 MG/DL (ref 70–130)
HBA1C MFR BLD: 5.3 % (ref 4.8–5.6)
HDLC SERPL-MCNC: 60 MG/DL (ref 40–60)
LDLC SERPL CALC-MCNC: 99 MG/DL (ref 0–100)
LDLC/HDLC SERPL: 1.64 {RATIO}
LEFT ATRIUM VOLUME INDEX: 17.4 ML/M2
LEFT ATRIUM VOLUME: 25.4 CM3
MAXIMAL PREDICTED HEART RATE: 144 BPM
QT INTERVAL: 362 MS
QT INTERVAL: 452 MS
QTC INTERVAL: 401 MS
QTC INTERVAL: 473 MS
STRESS TARGET HR: 122 BPM
TRIGL SERPL-MCNC: 78 MG/DL (ref 0–150)
VLDLC SERPL-MCNC: 15 MG/DL (ref 5–40)

## 2021-06-13 PROCEDURE — 99205 OFFICE O/P NEW HI 60 MIN: CPT | Performed by: PSYCHIATRY & NEUROLOGY

## 2021-06-13 PROCEDURE — 92610 EVALUATE SWALLOWING FUNCTION: CPT

## 2021-06-13 PROCEDURE — G0378 HOSPITAL OBSERVATION PER HR: HCPCS

## 2021-06-13 PROCEDURE — 80061 LIPID PANEL: CPT | Performed by: FAMILY MEDICINE

## 2021-06-13 PROCEDURE — 83036 HEMOGLOBIN GLYCOSYLATED A1C: CPT | Performed by: FAMILY MEDICINE

## 2021-06-13 PROCEDURE — 93306 TTE W/DOPPLER COMPLETE: CPT | Performed by: EMERGENCY MEDICINE

## 2021-06-13 PROCEDURE — 97165 OT EVAL LOW COMPLEX 30 MIN: CPT | Performed by: OCCUPATIONAL THERAPIST

## 2021-06-13 PROCEDURE — 82962 GLUCOSE BLOOD TEST: CPT

## 2021-06-13 PROCEDURE — 93306 TTE W/DOPPLER COMPLETE: CPT

## 2021-06-13 PROCEDURE — 99224 PR SBSQ OBSERVATION CARE/DAY 15 MINUTES: CPT | Performed by: FAMILY MEDICINE

## 2021-06-13 PROCEDURE — 97161 PT EVAL LOW COMPLEX 20 MIN: CPT | Performed by: PHYSICAL THERAPIST

## 2021-06-13 RX ORDER — ATORVASTATIN CALCIUM 40 MG/1
40 TABLET, FILM COATED ORAL NIGHTLY
Status: DISCONTINUED | OUTPATIENT
Start: 2021-06-14 | End: 2021-06-14 | Stop reason: HOSPADM

## 2021-06-13 RX ADMIN — ASPIRIN 81 MG: 81 TABLET, CHEWABLE ORAL at 08:45

## 2021-06-13 RX ADMIN — SODIUM CHLORIDE, PRESERVATIVE FREE 10 ML: 5 INJECTION INTRAVENOUS at 08:46

## 2021-06-13 RX ADMIN — ROSUVASTATIN CALCIUM 10 MG: 10 TABLET, FILM COATED ORAL at 08:45

## 2021-06-13 RX ADMIN — SODIUM CHLORIDE, PRESERVATIVE FREE 10 ML: 5 INJECTION INTRAVENOUS at 20:12

## 2021-06-13 NOTE — PLAN OF CARE
Goal Outcome Evaluation:  Plan of Care Reviewed With: patient, spouse        Progress: no change  Outcome Summary: PT evaluation completed. THe patient presents alert and oriented x4, but noticiable memory deficits. She demonstrates no focal neurological deficits in strength or sensation. She does demonstrate ataxic like movements in all extremities when performing new tasks, but not so much with familar tasks that she initiates. She was able to safely walk around in the room, but when given direction she was more unsteady. This does seem to be the patient's baseline function so PT will defer to outpt PT services to focus on balance and movement issues to decrease her fall risk.

## 2021-06-13 NOTE — PLAN OF CARE
Problem: Adult Inpatient Plan of Care  Goal: Plan of Care Review  Outcome: Ongoing, Progressing  Flowsheets (Taken 6/13/2021 0912)  Progress: (Eval) --  Plan of Care Reviewed With:   patient   spouse  Outcome Summary:  SLP clinical bedside swallowing evaluation completed. Pt was alert, cooperative, sitting upright in bed, spouse present. Denied hx of dysphagia except pt reported some difficulty swallowing large Calcium tablets, which she stated she breaks in half. Oral mech exam revealed minimal R lingual deviation upon protrusion, yet otherwise WFL. She was presented a full range of consistencies except mechanical soft. She was noted to have decreased labial seal on spoon, though functional. No noted concerns with oral transit or laryngeal elevation. 1-2x vocal quality change during eval, with 2x instance of mild cough following thin liquid trial, however, it must be noted that cough following thin was felt to be enduced by pt's initiation of laughing during consumption. Spouse denied dysphagia with breakfast this AM, reported cognitive concerns have been present for approximately five years. Of note, pt was recommended outpatient PT services for balance and safety training, therefore, they were educated that pt may also benefit from outpatient SLP services for strategies to assist in increasing and maintaining quality of life through ADL completion. Though ST cannot fully r/o aspiration at this time, ST feels oropharyngeal swallow fx is likely at baseline at this time.   RECOMMENDATIONS:    Continue regular solid diet consistency with regular/thin liquid consistency   meds whole with thin liquids   RN to monitor for increased lung congestion   general feeding and aspiration precautions. MD to re-consult if changes or new concerns arise. Thanks!

## 2021-06-13 NOTE — CASE MANAGEMENT/SOCIAL WORK
Discharge Planning Assessment  [facility]     Patient Name: Lavinia Aguilar  MRN: [id number]  Today's Date: 6/13/2021    Admit Date: 6/12/2021    Discharge Needs Assessment     Row Name 06/13/21 0914       Living Environment    Lives With  spouse    Name(s) of Who Lives With Patient  Mic    Current Living Arrangements  home/apartment/condo    Primary Care Provided by  self    Provides Primary Care For  no one    Family Caregiver if Needed  spouse    Quality of Family Relationships  helpful;involved;supportive    Able to Return to Prior Arrangements  yes       Resource/Environmental Concerns    Resource/Environmental Concerns  none       Transition Planning    Patient/Family Anticipates Transition to  home with family    Patient/Family Anticipated Services at Transition  none    Transportation Anticipated  family or friend will provide       Discharge Needs Assessment    Readmission Within the Last 30 Days  no previous admission in last 30 days    Equipment Currently Used at Home  walker, rolling;wheelchair    Concerns to be Addressed  no discharge needs identified    Anticipated Changes Related to Illness  none    Equipment Needed After Discharge  none    Discharge Coordination/Progress  Pt resides with spouse, Mic.  Pt has PCP, RX coverage and can afford medications.  SW spoke to spouse, Mic [phone].  Mic states that he cares for pt and she will return home.  Pt has all needed DME.  SW will follow.        Discharge Plan    No documentation.       Continued Care and Services - Admitted Since 6/12/2021    Coordination has not been started for this encounter.         Demographic Summary    No documentation.       Functional Status    No documentation.       Psychosocial    No documentation.       Abuse/Neglect    No documentation.       Legal    No documentation.       Substance Abuse    No documentation.       Patient Forms    No documentation.           ERIC Gutierrez

## 2021-06-13 NOTE — THERAPY DISCHARGE NOTE
Acute Care - Speech Language Pathology   Swallow Initial Evaluation/Discharge [facility]     Patient Name: Lavinia Aguilar  : 1945  MRN: [id number]  Today's Date: 2021               Admit Date: 2021     SLP clinical bedside swallowing evaluation completed. Pt was alert, cooperative, sitting upright in bed, spouse present. Denied hx of dysphagia except pt reported some difficulty swallowing large Calcium tablets, which she stated she breaks in half. Oral mech exam revealed minimal R lingual deviation upon protrusion, yet otherwise WFL. She was presented a full range of consistencies except mechanical soft. She was noted to have decreased labial seal on spoon, though functional. No noted concerns with oral transit or laryngeal elevation. 1-2x vocal quality change during eval, with 2x instance of mild cough following thin liquid trial, however, it must be noted that cough following thin was felt to be enduced by pt's initiation of laughing during consumption. Spouse denied dysphagia with breakfast this AM, reported cognitive concerns have been present for approximately five years. Of note, pt was recommended outpatient PT services for balance and safety training, therefore, they were educated that pt may also benefit from outpatient SLP services for strategies to assist in increasing and maintaining quality of life through ADL completion. Though ST cannot fully r/o aspiration at this time, ST feels oropharyngeal swallow fx is likely at baseline at this time.   RECOMMENDATIONS:   • Continue regular solid diet consistency with regular/thin liquid consistency  • meds whole with thin liquids  • RN to monitor for increased lung congestion  • general feeding and aspiration precautions. MD to re-consult if changes or new concerns arise. Thanks!  Analilia Paris CCC-SLP 2021 13:49 CDT    Visit Dx:    ICD-10-CM ICD-9-CM   1. Acute right-sided weakness  R53.1 728.87   2. TIA (transient ischemic attack)   G45.9 435.9   3. Dysphagia, unspecified type  R13.10 787.20     Patient Active Problem List   Diagnosis   • Mixed hyperlipidemia   • Hormone replacement therapy   • Cyst of lip   • Cold   • Palpitations   • Sprain of left rotator cuff capsule   • Family hx colonic polyps   • Herpes zoster without complication   • Skin lesion   • Acute sinusitis   • Weight loss   • Acute right-sided weakness     Past Medical History:   Diagnosis Date   • Bone/cartilage disorder    • Hyperlipidemia    • Left rotator cuff tear    • PONV (postoperative nausea and vomiting)      Past Surgical History:   Procedure Laterality Date   • CARPAL TUNNEL RELEASE     • CERVICAL SPINE SURGERY     • COLONOSCOPY     • COLONOSCOPY  06/19/2013    internal hemorrhoid   • COLONOSCOPY N/A 7/11/2018    Procedure: COLONOSCOPY WITH ANESTHESIA;  Surgeon: Andrew Palm MD;  Location: [facility] ENDOSCOPY;  Service: Gastroenterology   • HAMMER TOE REPAIR     • HYSTERECTOMY     • INSERTION / REMOVAL CRANIAL DBS GENERATOR     • KNEE CARTILAGE SURGERY     • NASAL SEPTUM SURGERY     • RECTAL SURGERY      rectocele   • SHOULDER ROTATOR CUFF REPAIR Left 02/2018   • SHOULDER SURGERY Right    • TONSILLECTOMY     • TUBAL ABDOMINAL LIGATION            SWALLOW EVALUATION (last 72 hours)      [facility] Adult Swallow Evaluation     Row Name 06/13/21 0912                   Rehab Evaluation    Document Type  evaluation  -TM        Subjective Information  no complaints  -TM        Patient Observations  alert;cooperative  -TM        Patient/Family/Caregiver Comments/Observations  Spouse present.  -TM        Care Plan Review  evaluation/treatment results reviewed;care plan/treatment goals reviewed;risks/benefits reviewed;current/potential barriers reviewed  -TM        Care Plan Review, Other Participant(s)  spouse  -TM        Patient Effort  adequate  -TM           General Information    Patient Profile Reviewed  yes  -TM        Pertinent History Of Current Problem  Acute dysarthric  speech, facial droop. Hx of bone/cartilage disorder, hyperlipidemia, L rotator cuff tear, post operative nausea and vomiting. CXR 06/12/21 showed no acute process, CT head 06/12/21 showed no acute process.  -TM        Current Method of Nutrition  regular textures;thin liquids  -TM        Precautions/Limitations, Vision  WFL  -TM        Precautions/Limitations, Hearing  WFL  -TM        Prior Level of Function-Communication  cognitive-linguistic impairment;other (see comments) For approximately five years per spouse   -TM        Prior Level of Function-Swallowing  no diet consistency restrictions  -TM        Plans/Goals Discussed with  patient;spouse/S.O.  -TM        Barriers to Rehab  cognitive status  -TM        Patient's Goals for Discharge  patient did not state  -TM        Family Goals for Discharge  family did not state  -TM           Pain    Additional Documentation  Pain Scale: Numbers Pre/Post-Treatment (Group)  -TM           Pain Scale: Numbers Pre/Post-Treatment    Pretreatment Pain Rating  0/10 - no pain  -TM        Posttreatment Pain Rating  0/10 - no pain  -TM           Oral Motor Structure and Function    Dentition Assessment  natural, present and adequate  -TM        Secretion Management  WNL/WFL  -TM        Mucosal Quality  moist, healthy  -TM           Oral Musculature and Cranial Nerve Assessment    Oral Motor General Assessment  WFL  -TM           General Eating/Swallowing Observations    Respiratory Support Currently in Use  room air  -TM        Eating/Swallowing Skills  fed by SLP  -TM        Positioning During Eating  upright 90 degree;upright in bed  -TM        Utensils Used  spoon;straw  -TM        Consistencies Trialed  pudding thick;honey-thick liquids;nectar/syrup-thick liquids;thin liquids;regular textures  -TM           Respiratory    Respiratory Status  WFL  -TM           Clinical Swallow Eval    Oral Prep Phase  WFL  -TM        Oral Transit  WFL  -TM        Oral Residue  WFL  -TM         Pharyngeal Phase  WFL  -TM        Esophageal Phase  unremarkable  -TM        Clinical Swallow Evaluation Summary  See note  -TM           Esophageal Phase Concerns    Esophageal Phase Concerns  --  -TM           Clinical Impression    SLP Swallowing Diagnosis  functional oral phase;functional pharyngeal phase  -TM        Functional Impact  risk of aspiration/pneumonia  -TM        Swallow Criteria for Skilled Therapeutic Interventions Met  baseline status  -TM           Recommendations    SLP Diet Recommendation  regular textures;thin liquids  -TM        Recommended Precautions and Strategies  upright posture during/after eating;small bites of food and sips of liquid  -TM        Oral Care Recommendations  Oral Care BID/PRN  -TM        SLP Rec. for Method of Medication Administration  meds whole;with thin liquids  -TM        Monitor for Signs of Aspiration  yes;cough;gurgly voice;throat clearing;pneumonia;right lower lobe infiltrates  -TM        Anticipated Discharge Disposition (SLP)  home with assist  -TM          User Key  (r) = Recorded By, (t) = Taken By, (c) = Cosigned By    Initials Name Effective Dates    TM Analilia Paris CCC-SLP 08/02/16 -           EDUCATION  The patient has been educated in the following areas:   Cognitive Impairment Dysphagia (Swallowing Impairment).    SLP Recommendation and Plan  SLP Swallowing Diagnosis: functional oral phase, functional pharyngeal phase  SLP Diet Recommendation: regular textures, thin liquids     Monitor for Signs of Aspiration: yes, cough, gurgly voice, throat clearing, pneumonia, right lower lobe infiltrates     Swallow Criteria for Skilled Therapeutic Interventions Met: baseline status  Anticipated Discharge Disposition (SLP): home with assist, home with OP services                    Anticipated Discharge Disposition (SLP): home with assist, home with OP services        Reason for Discharge: other (see comments) (Baseline status)    Plan of Care Reviewed With:  patient, spouse  Progress:  (Eval)  Outcome Summary: SLP clinical bedside swallowing evaluation completed. Pt was alert, cooperative, sitting upright in bed, spouse present. Denied hx of dysphagia except pt reported some difficulty swallowing large Calcium tablets, which she stated she breaks in half. Oral mech exam revealed minimal R lingual deviation upon protrusion, yet otherwise WFL. She was presented a full range of consistencies except mechanical soft. She was noted to have decreased labial seal on spoon, though functional. No noted concerns with oral transit or laryngeal elevation. 1-2x vocal quality change during eval, with 2x instance of mild cough following thin liquid trial, however, it must be noted that cough following thin was felt to be enduced by pt's initiation of laughing during consumption. Spouse denied dysphagia with breakfast this AM, reported cognitive concerns have been present for approximately five years. Of note, pt was recommended outpatient PT services for balance and safety training, therefore, they were educated that pt may also benefit from outpatient SLP services for strategies to assist in increasing and maintaining quality of life through ADL completion. Though ST cannot fully r/o aspiration at this time, ST feels oropharyngeal swallow fx is likely at baseline at this time. RECOMMENDATIONS: Continue regular solid diet consistency with regular/thin liquid consistency; meds whole with thin liquids; RN to monitor for increased lung congestion; general feeding and aspiration precautions. MD to re-consult if changes or new concerns arise. Thanks!             Time Calculation:   Time Calculation- SLP     Row Name 06/13/21 1336             Time Calculation- SLP    SLP Start Time  0912  -TM      SLP Stop Time  1022  -TM      SLP Time Calculation (min)  70 min  -      SLP Received On  06/13/21  -         Untimed Charges    SLP Eval/Re-eval   ST Eval Oral Pharyng Swallow - 45088  -       [id number] Eval Oral Pharyng Swallow Minutes  70  -TM         Total Minutes    Untimed Charges Total Minutes  70  -TM       Total Minutes  70  -TM        User Key  (r) = Recorded By, (t) = Taken By, (c) = Cosigned By    Initials Name Provider Type    TM Analilia Paris CCC-SLP Speech and Language Pathologist          Therapy Charges for Today     Code Description Service Date Service Provider Modifiers Qty    09119881421  ST EVAL ORAL PHARYNG SWALLOW 5 6/13/2021 Analilia Paris CCC-SLP GN 1               SLP Discharge Summary  Anticipated Discharge Disposition (SLP): home with assist, home with OP services  Reason for Discharge: other (see comments) (Baseline status)  Progress Toward Achieving Short/long Term Goals: other (see comments) (Eval only)  Discharge Destination: home w/ assist, home w/ OP services    AMAN Buck  6/13/2021

## 2021-06-13 NOTE — PLAN OF CARE
Goal Outcome Evaluation:  Plan of Care Reviewed With: patient, spouse        Progress: no change  Outcome Summary: Pt. is AxO x 4, pleasant.  Per pt & spouse report all neurofocal deficits have resolved.  Ms Aguilar moves in room at S & is able to LBD I'ly.  She demo's a chronic short term memory loss & impaired view of self deficits.  Spouse endorses pt s/s of dementia. All aforementioned cog deficits he states are chronic.  No further OT tx.  Anticipate Dc home with supportive spouse.

## 2021-06-13 NOTE — THERAPY DISCHARGE NOTE
Acute Care - Occupational Therapy Discharge  [facility]    Patient Name: Lavinia Aguilar  : 1945    MRN: [id number]                              Today's Date: 2021       Admit Date: 2021    Visit Dx:     ICD-10-CM ICD-9-CM   1. Acute right-sided weakness  R53.1 728.87   2. TIA (transient ischemic attack)  G45.9 435.9     Patient Active Problem List   Diagnosis   • Mixed hyperlipidemia   • Hormone replacement therapy   • Cyst of lip   • Cold   • Palpitations   • Sprain of left rotator cuff capsule   • Family hx colonic polyps   • Herpes zoster without complication   • Skin lesion   • Acute sinusitis   • Weight loss   • Acute right-sided weakness     Past Medical History:   Diagnosis Date   • Bone/cartilage disorder    • Hyperlipidemia    • Left rotator cuff tear    • PONV (postoperative nausea and vomiting)      Past Surgical History:   Procedure Laterality Date   • CARPAL TUNNEL RELEASE     • CERVICAL SPINE SURGERY     • COLONOSCOPY     • COLONOSCOPY  2013    internal hemorrhoid   • COLONOSCOPY N/A 2018    Procedure: COLONOSCOPY WITH ANESTHESIA;  Surgeon: Andrew Palm MD;  Location: [facility] ENDOSCOPY;  Service: Gastroenterology   • HAMMER TOE REPAIR     • HYSTERECTOMY     • INSERTION / REMOVAL CRANIAL DBS GENERATOR     • KNEE CARTILAGE SURGERY     • NASAL SEPTUM SURGERY     • RECTAL SURGERY      rectocele   • SHOULDER ROTATOR CUFF REPAIR Left 2018   • SHOULDER SURGERY Right    • TONSILLECTOMY     • TUBAL ABDOMINAL LIGATION       General Information     Row Name 21 0748          OT Time and Intention    Document Type  evaluation  -     Mode of Treatment  occupational therapy  -     Row Name 21 0748          General Information    Patient Profile Reviewed  yes  -CH     Prior Level of Function  independent:;all household mobility;ADL's  -     Existing Precautions/Restrictions  fall  -     Barriers to Rehab  cognitive status  -     Row Name 21 0748           Living Environment    Lives With  spouse  -     Row Name 06/13/21 0748          Home Main Entrance    Number of Stairs, Main Entrance  three  -     Stair Railings, Main Entrance  railings on both sides of stairs  -     Row Name 06/13/21 0748          Cognition    Orientation Status (Cognition)  oriented x 4  -     Row Name 06/13/21 0748          Safety Issues, Functional Mobility    Safety Issues Affecting Function (Mobility)  insight into deficits/self-awareness  -     Impairments Affecting Function (Mobility)  cognition  -     Cognitive Impairments, Mobility Safety/Performance  insight into deficits/self-awareness;judgment  -       User Key  (r) = Recorded By, (t) = Taken By, (c) = Cosigned By    Initials Name Provider Type     Mariangel Faustin, OTR/L Occupational Therapist        Mobility/ADL's     Row Name 06/13/21 0753 06/13/21 0748       Bed Mobility    Bed Mobility  supine-sit  -  supine-sit  -    Supine-Sit [place] (Bed Mobility)  modified independence  -  --    Assistive Device (Bed Mobility)  head of bed elevated;bed rails  -  head of bed elevated;bed rails  -    Row Name 06/13/21 0753 06/13/21 0748       Transfers    Transfers  sit-stand transfer  -  sit-stand transfer  -    Sit-Stand [place] (Transfers)  supervision  -  --    Row Name 06/13/21 0748          Functional Mobility    Functional Mobility- Ind. Level  conditional independence  -     Row Name 06/13/21 0753 06/13/21 0748       Activities of Daily Living    BADL Assessment/Intervention  lower body dressing  -  feeding;lower body dressing  -    Row Name 06/13/21 0753          Lower Body Dressing Assessment/Training    [place] Level (Lower Body Dressing)  modified independence;don;doff;socks  -       User Key  (r) = Recorded By, (t) = Taken By, (c) = Cosigned By    Initials Name Provider Type     Mariangel Faustin, OTR/L Occupational Therapist        Obj/Interventions     Row Name 06/13/21 0748           Vision Assessment/Intervention    Visual Impairment/Limitations  WFL  -     Row Name 06/13/21 0748          Range of Motion Comprehensive    General Range of Motion  bilateral upper extremity ROM WNL  -     Row Name 06/13/21 0748          Strength Comprehensive (MMT)    General Manual Muscle Testing (MMT) Assessment  no strength deficits identified  -     Row Name 06/13/21 0748          Balance    Balance Assessment  sitting static balance;sitting dynamic balance;sit to stand dynamic balance;standing dynamic balance;standing static balance  -     Static Sitting Balance  WFL  -CH     Dynamic Sitting Balance  WFL  -CH     Sit to Stand Dynamic Balance  WFL  -CH     Static Standing Balance  WFL  -CH     Dynamic Standing Balance  WFL  -CH     Balance Interventions  standing;sitting;sit to stand;supported;static  -CH       User Key  (r) = Recorded By, (t) = Taken By, (c) = Cosigned By    Initials Name Provider Type    Mariangel Barrios, OTR/L Occupational Therapist        Goals/Plan    No documentation.       Clinical Impression     Row Name 06/13/21 0748          Pain Assessment    Additional Documentation  Pain Scale: Numbers Pre/Post-Treatment (Group)  -     Row Name 06/13/21 0748          Pain Scale: Numbers Pre/Post-Treatment    Pretreatment Pain Rating  0/10 - no pain  -     Posttreatment Pain Rating  0/10 - no pain  -     Row Name 06/13/21 0748          Plan of Care Review    Plan of Care Reviewed With  patient;spouse  -     Progress  no change  -     Outcome Summary  Pt. is AxO x 4, pleasant.  Per pt & spouse report all neurofocal deficits have resolved.  Ms Aguilar moves in room at S & is able to LBD I'ly.  She demo's a chronic short term memory loss & impaired view of self deficits.  Spouse endorses pt s/s of dementia. All aforementioned cog deficits he states are chronic.  No further OT tx.  Anticipate Dc home with supportive spouse.  -     Row Name 06/13/21 0748          Therapy  Assessment/Plan (OT)    Criteria for Skilled Therapeutic Interventions Met (OT)  no;skilled treatment is necessary  -     Therapy Frequency (OT)  evaluation only  -     Row Name 06/13/21 0748          Therapy Plan Review/Discharge Plan (OT)    Anticipated Discharge Disposition (OT)  home with 24/7 care;home with assist;home with outpatient therapy services  -     Row Name 06/13/21 0748          Positioning and Restraints    Pre-Treatment Position  in bed  -     Post Treatment Position  chair  -     In Chair  sitting;call light within reach;encouraged to call for assist  -       User Key  (r) = Recorded By, (t) = Taken By, (c) = Cosigned By    Initials Name Provider Type    Mariangel Barrios, OTR/L Occupational Therapist        Outcome Measures     Row Name 06/13/21 0748          How much help from another is currently needed...    Putting on and taking off regular lower body clothing?  4  -CH     Bathing (including washing, rinsing, and drying)  4  -CH     Toileting (which includes using toilet bed pan or urinal)  4  -CH     Putting on and taking off regular upper body clothing  4  -CH     Taking care of personal grooming (such as brushing teeth)  4  -CH     Eating meals  4  -CH     AM-PAC 6 Clicks Score (OT)  24  -     Row Name 06/13/21 0748          Functional Assessment    Outcome Measure Options  AM-PAC 6 Clicks Daily Activity (OT)  -       User Key  (r) = Recorded By, (t) = Taken By, (c) = Cosigned By    Initials Name Provider Type    Mariangel Barrios, OTR/L Occupational Therapist          OT Recommendation and Plan  Retired Outcome Summary/Treatment Plan (OT)  Anticipated Discharge Disposition (OT): home with 24/7 care, home with assist, home with outpatient therapy services  Therapy Frequency (OT): evaluation only  Plan of Care Review  Plan of Care Reviewed With: patient, spouse  Progress: no change  Outcome Summary: Pt. is AxO x 4, pleasant.  Per pt & spouse report all neurofocal deficits  have resolved.  Ms Aguilar moves in room at S & is able to LBD I'ly.  She demo's a chronic short term memory loss & impaired view of self deficits.  Spouse endorses pt s/s of dementia. All aforementioned cog deficits he states are chronic.  No further OT tx.  Anticipate Dc home with supportive spouse.  Plan of Care Reviewed With: patient, spouse  Outcome Summary: Pt. is AxO x 4, pleasant.  Per pt & spouse report all neurofocal deficits have resolved.  Ms Aguilar moves in room at S & is able to LBD I'ly.  She demo's a chronic short term memory loss & impaired view of self deficits.  Spouse endorses pt s/s of dementia. All aforementioned cog deficits he states are chronic.  No further OT tx.  Anticipate Dc home with supportive spouse.     Time Calculation:   Time Calculation- OT     Row Name 06/13/21 0748             Time Calculation- OT    OT Start Time  0748 add 8 min for chart review  -CH      OT Stop Time  0820  -CH      OT Time Calculation (min)  32 min  -      OT Received On  06/13/21  -CH         Untimed Charges    OT Eval/Re-eval Minutes  40  -CH         Total Minutes    Untimed Charges Total Minutes  40  -CH       Total Minutes  40  -CH        User Key  (r) = Recorded By, (t) = Taken By, (c) = Cosigned By    Initials Name Provider Type     Mariangel Faustin OTR/L Occupational Therapist        Therapy Charges for Today     Code Description Service Date Service Provider Modifiers Qty    02510182980 HC OT EVAL LOW COMPLEXITY 3 6/13/2021 Mariangel Faustin OTR/L GO 1               ALVARO Pastor/ALEKS  6/13/2021

## 2021-06-13 NOTE — H&P
"[facility]  HISTORY AND PHYSICAL    Date of Admission: 6/12/2021  Primary Care Physician: Praveen Leung MD    Subjective    Chief Complaint: \"they think I had stroke\"    This is a 76 yr old lady who presented to the ed with dysarthric speech and facial droop. She was thought to be outside the window for tpa and her symptoms improved in the ed with stroke scale of 3. The ed provider discussed with neurology and she was admitted to my services.      Review of Systems   Constitutional: Negative.    HENT: Negative.    Eyes: Negative.    Respiratory: Negative.    Cardiovascular: Negative.    Gastrointestinal: Negative.    Endocrine: Negative.    Genitourinary: Negative.    Musculoskeletal: Negative.    Skin: Negative.    Allergic/Immunologic: Negative.    Neurological: Negative.    Hematological: Negative.    Psychiatric/Behavioral: Negative.         Otherwise complete ROS reviewed and negative except as mentioned in the HPI.      Past Medical History:   Past Medical History:   Diagnosis Date   • Bone/cartilage disorder    • Hyperlipidemia    • Left rotator cuff tear    • PONV (postoperative nausea and vomiting)        Past Surgical History:  Past Surgical History:   Procedure Laterality Date   • CARPAL TUNNEL RELEASE     • CERVICAL SPINE SURGERY     • COLONOSCOPY     • COLONOSCOPY  06/19/2013    internal hemorrhoid   • COLONOSCOPY N/A 7/11/2018    Procedure: COLONOSCOPY WITH ANESTHESIA;  Surgeon: Andrew Palm MD;  Location: [facility] ENDOSCOPY;  Service: Gastroenterology   • HAMMER TOE REPAIR     • HYSTERECTOMY     • INSERTION / REMOVAL CRANIAL DBS GENERATOR     • KNEE CARTILAGE SURGERY     • NASAL SEPTUM SURGERY     • RECTAL SURGERY      rectocele   • SHOULDER ROTATOR CUFF REPAIR Left 02/2018   • SHOULDER SURGERY Right    • TONSILLECTOMY     • TUBAL ABDOMINAL LIGATION         Social History:  reports that she has never smoked. She has never used smokeless tobacco. She reports that she does not " "drink alcohol and does not use drugs.    Family History: family history includes Colon polyps in her mother.     Allergies:  No Known Allergies    Medications:  Prior to Admission medications    Medication Sig Start Date End Date Taking? Authorizing Provider   Calcium-Vitamin D 500-125 MG-UNIT tablet Take 1 tablet by mouth Daily.   Yes Dar Castro MD   estradiol (Climara) 0.05 MG/24HR patch Place 1 patch on the skin as directed by provider 1 (One) Time Per Week for 90 days. 6/10/21 9/8/21 Yes Praveen Leung MD   Multiple Vitamins-Minerals (MULTIVITAMIN ADULT PO) Take 1 tablet by mouth Daily. 4/15/14  Yes Dar Castro MD   rosuvastatin (CRESTOR) 10 MG tablet Take 1 tablet by mouth Daily. 6/10/21  Yes Praveen Leung MD       Objective    Vital Signs: /66 (BP Location: Right arm, Patient Position: Lying)   Pulse 75   Temp 98 °F (36.7 °C) (Oral)   Resp 18   Ht 160 cm (63\")   Wt 47.2 kg (104 lb)   SpO2 91%   BMI 18.42 kg/m²   Physical Exam  Vitals and nursing note reviewed.   Constitutional:       Appearance: Normal appearance. She is normal weight.   HENT:      Head: Normocephalic and atraumatic.      Nose: Nose normal.      Mouth/Throat:      Mouth: Mucous membranes are moist.      Pharynx: Oropharynx is clear.   Eyes:      Extraocular Movements: Extraocular movements intact.      Conjunctiva/sclera: Conjunctivae normal.      Pupils: Pupils are equal, round, and reactive to light.   Cardiovascular:      Rate and Rhythm: Normal rate and regular rhythm.      Pulses: Normal pulses.      Heart sounds: Normal heart sounds.   Pulmonary:      Effort: Pulmonary effort is normal.      Breath sounds: Normal breath sounds. No stridor.   Abdominal:      General: Abdomen is flat. Bowel sounds are normal.      Palpations: Abdomen is soft.   Musculoskeletal:         General: Normal range of motion.      Cervical back: Normal range of motion and neck supple.   Skin:     General: Skin is " warm and dry.      Capillary Refill: Capillary refill takes less than 2 seconds.   Neurological:      General: No focal deficit present.      Mental Status: She is alert and oriented to person, place, and time. Mental status is at baseline.   Psychiatric:         Mood and Affect: Mood normal.         Behavior: Behavior normal.         Thought Content: Thought content normal.         Judgment: Judgment normal.         Results Reviewed:  Lab Results (last 24 hours)     Procedure Component Value Units Date/Time    POC Glucose Once [[id number]]  (Normal) Collected: 06/13/21 0559    Specimen: Blood Updated: 06/13/21 0621     Glucose 89 mg/dL      Comment: : BARBARA Hoff ID: [id number]       Lipid Panel [[id number]] Collected: 06/13/21 0441    Specimen: Blood Updated: 06/13/21 0543     Total Cholesterol 174 mg/dL      Triglycerides 78 mg/dL      HDL Cholesterol 60 mg/dL      LDL Cholesterol  99 mg/dL      VLDL Cholesterol 15 mg/dL      LDL/HDL Ratio 1.64    Narrative:      Cholesterol Reference Ranges  (U.S. Department of Health and Human Services ATP III Classifications)    Desirable          <200 mg/dL  Borderline High    200-239 mg/dL  High Risk          >240 mg/dL      Triglyceride Reference Ranges  (U.S. Department of Health and Human Services ATP III Classifications)    Normal           <150 mg/dL  Borderline High  150-199 mg/dL  High             200-499 mg/dL  Very High        >500 mg/dL    HDL Reference Ranges  (U.S. Department of Health and Human Services ATP III Classifcations)    Low     <40 mg/dl (major risk factor for CHD)  High    >60 mg/dl ('negative' risk factor for CHD)        LDL Reference Ranges  (U.S. Department of Health and Human Services ATP III Classifcations)    Optimal          <100 mg/dL  Near Optimal     100-129 mg/dL  Borderline High  130-159 mg/dL  High             160-189 mg/dL  Very High        >189 mg/dL    Hemoglobin A1c [[id number]]  (Normal) Collected: 06/13/21 0441     Specimen: Blood Updated: 06/13/21 0534     Hemoglobin A1C 5.30 %     Narrative:      Hemoglobin A1C Ranges:    Increased Risk for Diabetes  5.7% to 6.4%  Diabetes                     >= 6.5%  Diabetic Goal                < 7.0%    POC Glucose Once [[id number]]  (Normal) Collected: 06/13/21 0104    Specimen: Blood Updated: 06/13/21 0125     Glucose 91 mg/dL      Comment: : BARBARA FitzpatrickaMeter ID: [id number]       COVID-19,[facility] IN-HOUSE,Nasal Swab No Transport Media 3-4 HR TAT - Swab, Nasal Cavity [[id number]]  (Normal) Collected: 06/12/21 1652    Specimen: Swab from Nasal Cavity Updated: 06/12/21 1744     COVID19 Not Detected    Narrative:      Fact sheet for providers: https://www.fda.gov/media/806263/download     Fact sheet for patients: https://www.fda.gov/media/864990/download    Test performed by PCR.    Consider negative results in combination with clinical observations, patient history, and epidemiological information.    [place] Draw [[id number]] Collected: 06/12/21 1530    Specimen: Blood Updated: 06/12/21 1645    Narrative:      The following orders were created for panel order [place] Draw.  Procedure                               Abnormality         Status                     ---------                               -----------         ------                     Green Top (Gel)[[id number]]                                  Final result               Lavender Top[[id number]]                                     Final result               Red Top[[id number]]                                          Final result                 Please view results for these tests on the individual orders.    Green Top (Gel) [[id number]] Collected: 06/12/21 1530    Specimen: Blood Updated: 06/12/21 1645     Extra Tube Hold for add-ons.     Comment: Auto resulted.       Red Top [[id number]] Collected: 06/12/21 1530    Specimen: Blood Updated: 06/12/21 1645     Extra Tube Hold for add-ons.     Comment: Auto  resulted.       Lavender Top [[id number]] Collected: 06/12/21 1530    Specimen: Blood Updated: 06/12/21 1645     Extra Tube hold for add-on     Comment: Auto resulted       POC Glucose Once [[id number]]  (Normal) Collected: 06/12/21 1602    Specimen: Blood Updated: 06/12/21 1612     Glucose 85 mg/dL      Comment: : [id number] Philippe SultanaicaMeter ID: [id number]       Comprehensive Metabolic Panel [[id number]]  (Abnormal) Collected: 06/12/21 1530    Specimen: Blood Updated: 06/12/21 1556     Glucose 100 mg/dL      BUN 15 mg/dL      Creatinine 0.68 mg/dL      Sodium 139 mmol/L      Potassium 4.2 mmol/L      Chloride 102 mmol/L      CO2 30.0 mmol/L      Calcium 9.4 mg/dL      Total Protein 7.0 g/dL      Albumin 4.10 g/dL      ALT (SGPT) 15 U/L      AST (SGOT) 20 U/L      Alkaline Phosphatase 76 U/L      Total Bilirubin <0.2 mg/dL      eGFR Non African Amer 84 mL/min/1.73      Globulin 2.9 gm/dL      A/G Ratio 1.4 g/dL      BUN/Creatinine Ratio 22.1     Anion Gap 7.0 mmol/L     Narrative:      GFR Normal >60  Chronic Kidney Disease <60  Kidney Failure <15      Troponin [[id number]]  (Normal) Collected: 06/12/21 1530    Specimen: Blood Updated: 06/12/21 1554     Troponin T <0.010 ng/mL     Narrative:      Troponin T Reference Range:  <= 0.03 ng/mL-   Negative for AMI  >0.03 ng/mL-     Abnormal for myocardial necrosis.  Clinicians would have to utilize clinical acumen, EKG, Troponin and serial changes to determine if it is an Acute Myocardial Infarction or myocardial injury due to an underlying chronic condition.       Results may be falsely decreased if patient taking Biotin.      Protime-INR [[id number]]  (Normal) Collected: 06/12/21 1531    Specimen: Blood Updated: 06/12/21 1548     Protime 12.0 Seconds      INR 0.96    aPTT [[id number]]  (Normal) Collected: 06/12/21 1531    Specimen: Blood Updated: 06/12/21 1548     PTT 32.3 seconds     CBC & Differential [[id number]]  (Normal) Collected: 06/12/21 1530    Specimen: Blood  Updated: 06/12/21 1539    Narrative:      The following orders were created for panel order CBC & Differential.  Procedure                               Abnormality         Status                     ---------                               -----------         ------                     CBC Auto Differential[[id number]]        Normal              Final result                 Please view results for these tests on the individual orders.    CBC Auto Differential [[id number]]  (Normal) Collected: 06/12/21 1530    Specimen: Blood Updated: 06/12/21 1539     WBC 6.99 10*3/mm3      RBC 4.24 10*6/mm3      Hemoglobin 13.5 g/dL      Hematocrit 40.8 %      MCV 96.2 fL      MCH 31.8 pg      MCHC 33.1 g/dL      RDW 12.8 %      RDW-SD 45.4 fl      MPV 9.9 fL      Platelets 265 10*3/mm3      Neutrophil % 58.1 %      Lymphocyte % 31.9 %      Monocyte % 7.7 %      Eosinophil % 1.9 %      Basophil % 0.3 %      Immature Grans % 0.1 %      Neutrophils, Absolute 4.06 10*3/mm3      Lymphocytes, Absolute 2.23 10*3/mm3      Monocytes, Absolute 0.54 10*3/mm3      Eosinophils, Absolute 0.13 10*3/mm3      Basophils, Absolute 0.02 10*3/mm3      Immature Grans, Absolute 0.01 10*3/mm3      nRBC 0.0 /100 WBC         Imaging Results (Last 24 Hours)     Procedure Component Value Units Date/Time    CT CEREBRAL PERFUSION WITH & WITHOUT CONTRAST [[id number]] Collected: 06/12/21 1615     Updated: 06/12/21 1756    Narrative:      Indication: Code stroke, slurred speech, left-sided facial droop     Exam:      1. Perfusion CT is performed to acquire images tracking the temporal  course of iodinated contrast material passing through the cerebral  circulation. Perfusion parameters, such as cerebral blood flow (CBF),  cerebral blood volume (CBV), mean transit time (MTT), etc. are  calculated by [employer] with additional provided perfusion maps and  estimated stroke volumes.  2. Automated exposure control (AEC) protocols are utilized on the  scanner to ensure dose  lowered technique.      Comparison: Noncontrast CT brain and brain angiogram dated 6/12/2021  3:44 PM CDT     Findings:     Tmax >6.0 seconds volume: 6 seconds ml     CBF < 30% volume: 0 mL ml     Mismatch volume: 13 ml      Mismatch ratio: infinite       Impression:      Impression:  1. Images sent to [facility] for interpretation.  This report was finalized on 06/12/2021 17:48 by Dr. Sim Andrade MD.    XR Chest 1 View [[id number]] Collected: 06/12/21 1715     Updated: 06/12/21 1719    Narrative:      EXAMINATION: XR CHEST 1 VW-. 6/12/2021 5:15 PM CDT     CHEST, ONE VIEW:     HISTORY: Stroke     COMPARISON: 7/8/2011     A single frontal chest radiograph was obtained.     FINDINGS:     The lungs are clear without acute infiltrates.     The heart is normal in size, pulmonary circulation appropriate, without  heart failure.     The intracranial electronic stimulator generators project over the upper  chest.     Changes from left reverse shoulder arthroplasty observed.     Changes from anterior cervical fusion plating identified.     No acute osseous abnormalities observed.                                     Impression:      1. No acute cardiopulmonary process.     This report was finalized on 06/12/2021 17:16 by Dr. Sim Andrade MD.    CT Angiogram Head w AI Analysis of LVO [[id number]] Collected: 06/12/21 1556     Updated: 06/12/21 1606    Narrative:      EXAMINATION:  CT ANGIOGRAM HEAD W AI ANALYSIS OF LVO-  6/12/2021 3:35 PM  CDT     HISTORY: Stroke, follow up      COMPARISON: Head CT and neck CT angiogram performed today     TECHNIQUE:      CT angiography head was performed.     Radiation dose equals  mGy-cm.  Automated exposure control dose  reduction technique was implemented.     Thin section axial imaging was obtained with intravenous contrast. 2-D  sagittal and coronal reconstruction images were generated.     Multi projection Maximum intensity projection angiographic imaging  obtained using volume  rendering technique.     AI ANALYSIS OF LVO WAS UTILIZED.        NASCET criteria was used for the angiographic calculation of diameter  stenosis.     FINDINGS: The distal vertebral arteries are imaged symmetrically. The  basilar artery is intact.     Posterior fossa vessels are imaged symmetrically.     The distal internal carotid arteries are intact. There is mild calcified  atheromatous plaque involving the cavernous and supraclinoid internal  carotid arteries.     The anterior cerebral arteries are imaged symmetrically without  steno-occlusive changes.     The right and left middle cerebral arteries are also imaged  symmetrically without steno-occlusive changes or large vessel  occlusions.     Posterior cerebral arteries are intact symmetrically without  steno-occlusive change.     The vessels comprising the [place] are intact.     There is no cerebral aneurysm.     The dural venous sinuses enhance appropriately without thrombus.       Impression:      1. Mild calcified atheromatous plaque in the cavernous and supraclinoid  internal carotid arteries without hemodynamic significant  steno-occlusive disease.  2. Otherwise negative CT angiography of the head, without aneurysm,  dissection or significant steno-occlusive changes or large vessel  occlusions..  This report was finalized on 06/12/2021 16:03 by Dr. Sim Andrade MD.    CT Angiogram Neck [[id number]] Collected: 06/12/21 1550     Updated: 06/12/21 1558    Narrative:      EXAMINATION:  CT ANGIOGRAM NECK-  6/12/2021 3:35 PM CDT     HISTORY: Stroke, follow up      COMPARISON: Head CT performed earlier today     TECHNIQUE: CT angiography neck was performed. Radiation dose equals DLP  164 mGy-cm.  Automated exposure control dose reduction technique was  implemented.     Thin section axial imaging was obtained with intravenous contrast. 2-D  sagittal and coronal reconstruction images were generated.     Multi projection Angiographic imaging obtained using  a maximum intensity  projection technique.     NASCET criteria was used for the angiographic calculation of diameter  stenosis.     FINDINGS:      The brachiocephalic vessels arising from the aortic arch are widely  patent. Bovine arch is appreciated, normal variant.     The right common carotid artery is widely patent.     The right carotid bifurcation is widely patent.     The right internal carotid artery is ectatic.     The right ICA is widely patent without steno-occlusive changes.     The left common carotid artery is widely patent.     The left carotid bifurcation is patent without steno-occlusive changes.     The left internal carotid artery is ectatic without stenosis.     External carotid arteries are patent bilaterally without steno-occlusive  changes.     The vertebral arteries are intact bilaterally and patent without  steno-occlusive change.     There is no CT angiographic evidence of dissection or aneurysm.     Fibronodular and cystic changes appreciated in the lung apices  compatible chronic change..       Impression:      1. Negative CT angiography of the neck.  This report was finalized on 06/12/2021 15:55 by Dr. Sim Andrade MD.    CT Head Without Contrast Stroke Protocol [[id number]] Collected: 06/12/21 1542     Updated: 06/12/21 1553    Narrative:      EXAMINATION: CT HEAD WO CONTRAST STROKE PROTOCOL-  6/12/2021 3:42 PM CDT     CT SCAN OF THE HEAD, WITHOUT CONTRAST:      HISTORY: Stroke symptoms     COMPARISONS: None      TECHNIQUE:     Radiation dose equals  mGy-cm.  Automated exposure control dose  reduction technique was implemented.        CT evaluation of the head without intravenous contrast. 5 mm transaxial  images were obtained.   2-D sagittal and coronal reconstruction images  were generated.     FINDINGS:      There are bilateral basal ganglia thalamic electrodes appreciated from a  frontal approach with significant associated metallic artifact.     There is significant motion  artifact.     There is central and cortical atrophy.     There is low-attenuation in the periventricular and subcortical white  matter compatible with chronic ischemic changes.     There is no definite intra or extra-axial hemorrhage.     There is no mass effect or midline shift.     There is no hydrocephalus.     There is no skull fracture or acute calvarial abnormality.     Paranasal sinuses imaged part are grossly clear.     These findings were discussed with Dr. Bernardo Bajwa, emergency room  physician, at the time of dictation 3:45 PM, 6/12/2021.       Impression:      1. Significant Image quality degradation associated with bilateral basal  ganglia/thalamic electrodes and motion artifact.  2. Atrophy and chronic ischemic white matter changes.  3. No definite CT evidence of acute intracranial process.           This report was finalized on 06/12/2021 15:49 by Dr. Sim Andrade MD.            Active Hospital Problems    Diagnosis    • Acute right-sided weakness        Assessment / Plan  Neuro checks, cardiology consultation, mri brain and echo.      Code Status: full code     I discussed the patient's findings and my recommendations with the patient..    Estimated length of stay 24hrs.    Praveen Leung MD   06/13/21   07:30 CDT

## 2021-06-13 NOTE — THERAPY DISCHARGE NOTE
Patient Name: Lavinia Aguilar  : 1945    MRN: [id number]                              Today's Date: 2021       Admit Date: 2021    Visit Dx:     ICD-10-CM ICD-9-CM   1. Acute right-sided weakness  R53.1 728.87   2. TIA (transient ischemic attack)  G45.9 435.9     Patient Active Problem List   Diagnosis   • Mixed hyperlipidemia   • Hormone replacement therapy   • Cyst of lip   • Cold   • Palpitations   • Sprain of left rotator cuff capsule   • Family hx colonic polyps   • Herpes zoster without complication   • Skin lesion   • Acute sinusitis   • Weight loss   • Acute right-sided weakness     Past Medical History:   Diagnosis Date   • Bone/cartilage disorder    • Hyperlipidemia    • Left rotator cuff tear    • PONV (postoperative nausea and vomiting)      Past Surgical History:   Procedure Laterality Date   • CARPAL TUNNEL RELEASE     • CERVICAL SPINE SURGERY     • COLONOSCOPY     • COLONOSCOPY  2013    internal hemorrhoid   • COLONOSCOPY N/A 2018    Procedure: COLONOSCOPY WITH ANESTHESIA;  Surgeon: Andrew Palm MD;  Location: [facility] ENDOSCOPY;  Service: Gastroenterology   • HAMMER TOE REPAIR     • HYSTERECTOMY     • INSERTION / REMOVAL CRANIAL DBS GENERATOR     • KNEE CARTILAGE SURGERY     • NASAL SEPTUM SURGERY     • RECTAL SURGERY      rectocele   • SHOULDER ROTATOR CUFF REPAIR Left 2018   • SHOULDER SURGERY Right    • TONSILLECTOMY     • TUBAL ABDOMINAL LIGATION       General Information     Row Name 21 0750          Physical Therapy Time and Intention    Document Type  evaluation presented with speech problem, deep brain stimulator for essential tremors  -MS     Mode of Treatment  physical therapy;co-treatment  -MS     Row Name 21 0750          General Information    Patient Profile Reviewed  yes  -MS     Prior Level of Function  independent:;all household mobility;ADL's  -MS     Existing Precautions/Restrictions  fall  -MS     Barriers to Rehab  cognitive status   -MS     Row Name 06/13/21 0750          Living Environment    Lives With  spouse  -MS     Row Name 06/13/21 0750          Home Main Entrance    Number of Stairs, Main Entrance  three  -MS     Stair Railings, Main Entrance  railings on both sides of stairs  -MS     Row Name 06/13/21 0750          Stairs Within Home, Primary    Number of Stairs, Within Home, Primary  none  -MS     Row Name 06/13/21 0750          Cognition    Orientation Status (Cognition)  oriented x 4  -MS     Row Name 06/13/21 0750          Safety Issues, Functional Mobility    Safety Issues Affecting Function (Mobility)  insight into deficits/self-awareness  -MS     Impairments Affecting Function (Mobility)  cognition  -MS     Cognitive Impairments, Mobility Safety/Performance  problem-solving/reasoning  -MS     Comment, Safety Issues/Impairments (Mobility)  impaired memory  -MS       User Key  (r) = Recorded By, (t) = Taken By, (c) = Cosigned By    Initials Name Provider Type    Robyn Maldonado, PT, DPT, NCS Physical Therapist        Mobility     Row Name 06/13/21 0750          Bed Mobility    Bed Mobility  supine-sit  -MS     Supine-Sit [place] (Bed Mobility)  modified independence  -MS     Assistive Device (Bed Mobility)  head of bed elevated;bed rails  -MS     Row Name 06/13/21 0750          Sit-Stand Transfer    Sit-Stand [place] (Transfers)  supervision  -MS     Row Name 06/13/21 0750          Gait/Stairs (Locomotion)    [place] Level (Gait)  supervision  -MS     Distance in Feet (Gait)  30ft pt able to manuver around the room around obstacles without difficutly  -MS       User Key  (r) = Recorded By, (t) = Taken By, (c) = Cosigned By    Initials Name Provider Type    Robyn Maldonado, PT, DPT, NCS Physical Therapist        Obj/Interventions     Row Name 06/13/21 0750          Range of Motion Comprehensive    General Range of Motion  no range of motion deficits identified  -MS     Row Name 06/13/21 0750          Strength  Comprehensive (MMT)    General Manual Muscle Testing (MMT) Assessment  no strength deficits identified  -MS     Row Name 06/13/21 0750          Motor Skills    Motor Skills  coordination  -MS     Coordination  ataxia all extremities ataxia when performing unfamilar tasks  -MS     Row Name 06/13/21 0750          Balance    Static Sitting Balance  WFL  -MS     Dynamic Sitting Balance  WFL  -MS     Static Standing Balance  WFL pt able to stand rhomberg with eyes closed  -MS     Dynamic Standing Balance  WFL  -MS     Row Name 06/13/21 0750          Sensory Assessment (Somatosensory)    Sensory Assessment (Somatosensory)  sensation intact  -MS       User Key  (r) = Recorded By, (t) = Taken By, (c) = Cosigned By    Initials Name Provider Type    MS Robyn Marshall R, PT, DPT, NCS Physical Therapist        Goals/Plan    No documentation.       Clinical Impression     [facility] Name 06/13/21 0848          Pain    Additional Documentation  Pain Scale: Numbers Pre/Post-Treatment (Group)  -MS     Row Name 06/13/21 0859          Pain Scale: Numbers Pre/Post-Treatment    Pretreatment Pain Rating  0/10 - no pain  -MS     Posttreatment Pain Rating  0/10 - no pain  -MS     Row Name 06/13/21 0886          Plan of Care Review    Plan of Care Reviewed With  patient;spouse  -MS     Progress  no change  -MS     Outcome Summary  PT evaluation completed. THe patient presents alert and oriented x4, but noticiable memory deficits. She demonstrates no focal neurological deficits in strength or sensation. She does demonstrate ataxic like movements in all extremities when performing new tasks, but not so much with familar tasks that she initiates. She was able to safely walk around in the room, but when given direction she was more unsteady. This does seem to be the patient's baseline function so PT will defer to outpt PT services to focus on balance and movement issues to decrease her fall risk.  -MS     Row Name 06/13/21 0819          Therapy  Assessment/Plan (PT)    Criteria for Skilled Interventions Met (PT)  does not meet criteria for skilled intervention  -MS     Row Name 06/13/21 0848          Positioning and Restraints    Post Treatment Position  chair  -MS     In Chair  sitting;call light within reach;encouraged to call for assist;with family/caregiver  -MS       User Key  (r) = Recorded By, (t) = Taken By, (c) = Cosigned By    Initials Name Provider Type    Dinesh Maldonadoanita CORONEL, PT, DPT, NCS Physical Therapist        Outcome Measures     Row Name 06/13/21 0853          Modified Covert Scale    Modified Martha Scale  2 - Slight disability.  Unable to carry out all previous activities but able to look after own affairs without assistance.  -MS     Row Name 06/13/21 0853 06/13/21 0748       Functional Assessment    Outcome Measure Options  Modified Covert  -MS  AM-PAC 6 Clicks Daily Activity (OT)  -      User Key  (r) = Recorded By, (t) = Taken By, (c) = Cosigned By    Initials Name Provider Type     Mariangel Faustin, OTR/L Occupational Therapist    MS MarshallRobyn, PT, DPT, NCS Physical Therapist          PT Recommendation and Plan     Plan of Care Reviewed With: patient, spouse  Progress: no change  Outcome Summary: PT evaluation completed. THe patient presents alert and oriented x4, but noticiable memory deficits. She demonstrates no focal neurological deficits in strength or sensation. She does demonstrate ataxic like movements in all extremities when performing new tasks, but not so much with familar tasks that she initiates. She was able to safely walk around in the room, but when given direction she was more unsteady. This does seem to be the patient's baseline function so PT will defer to outpt PT services to focus on balance and movement issues to decrease her fall risk.     Time Calculation:   PT Charges     Row Name 06/13/21 0853             Time Calculation    Start Time  0750  -MS      Stop Time  0830  -MS      Time Calculation  (min)  40 min  -MS      PT Received On  06/13/21  -MS         Untimed Charges    PT Eval/Re-eval Minutes  40  -MS         Total Minutes    Untimed Charges Total Minutes  40  -MS       Total Minutes  40  -MS        User Key  (r) = Recorded By, (t) = Taken By, (c) = Cosigned By    Initials Name Provider Type    Robyn Maldonado, PT, DPT, NCS Physical Therapist        Therapy Charges for Today     Code Description Service Date Service Provider Modifiers Qty    88674872147 HC PT EVAL LOW COMPLEXITY 3 6/13/2021 Robyn Marshall PT, DPT, NCS GP 1          PT G-Codes  Outcome Measure Options: Modified Tift  AM-PAC 6 Clicks Score (OT): 24  Modified [place] Scale: 2 - Slight disability.  Unable to carry out all previous activities but able to look after own affairs without assistance.    PT Discharge Summary  Anticipated Discharge Disposition (PT): home with assist, home with outpatient therapy services    Robyn Marshall, PT, DPT, NCS  6/13/2021

## 2021-06-13 NOTE — PLAN OF CARE
Problem: Adult Inpatient Plan of Care  Goal: Plan of Care Review  6/13/2021 0248 by Tracy Curiel RN  Outcome: Ongoing, Progressing  Flowsheets (Taken 6/13/2021 0243)  Outcome Summary: Patient alert and oriented.  NIH performed as ordered, neuro checks completed per order, MORA. Facial droop appears to have resolved.  NIH 1 for expressive aphasia.  Patient up w/ standby assist to bathroom.  Heart auscaltation regular, pulses regular and strong.  Denies pain.  Education provided.  Resting comfortably.  Fall and safety precautions initiated and intact.  6/13/2021 0243 by Tracy Curiel RN  Outcome: Ongoing, Progressing  Flowsheets (Taken 6/13/2021 0243)  Plan of Care Reviewed With: patient  Outcome Summary: Patient alert and oriented.  NIH performed as ordered, neuro checks completed per order, MORA. Facial droop appears to have resolved.  NIH 1 for dysarthria.  Patient up w/ standby assist to bathroom.  Heart auscaltation regular, pulses regular and strong.  Denies pain.  Education provided.  Resting comfortably.  Fall and safety precautions initiated and intact.  Goal: Patient-Specific Goal (Individualized)  6/13/2021 0248 by Tracy Curiel RN  Outcome: Ongoing, Progressing  6/13/2021 0243 by Trayc Curiel RN  Outcome: Ongoing, Progressing  Goal: Absence of Hospital-Acquired Illness or Injury  6/13/2021 0248 by Tracy Curiel RN  Outcome: Ongoing, Progressing  6/13/2021 0243 by Tracy Curiel RN  Outcome: Ongoing, Progressing  Intervention: Identify and Manage Fall Risk  Recent Flowsheet Documentation  Taken 6/13/2021 0200 by Tracy Curiel RN  Safety Promotion/Fall Prevention:  • fall prevention program maintained  • safety round/check completed  • assistive device/personal items within reach  • clutter free environment maintained  Taken 6/13/2021 0014 by Tracy Curiel RN  Safety Promotion/Fall Prevention:  • activity supervised  • fall prevention program maintained  • nonskid  shoes/slippers when out of bed  • safety round/check completed  Taken 6/12/2021 2041 by Tracy Curiel RN  Safety Promotion/Fall Prevention:  • fall prevention program maintained  • safety round/check completed  • activity supervised  • clutter free environment maintained  • assistive device/personal items within reach  • nonskid shoes/slippers when out of bed  Intervention: Prevent Skin Injury  Recent Flowsheet Documentation  Taken 6/13/2021 0200 by Tracy Curiel RN  Body Position: position changed independently  Taken 6/13/2021 0014 by Tracy Curiel RN  Body Position: position changed independently  Taken 6/12/2021 2041 by Tracy Curiel RN  Body Position: position changed independently  Skin Protection: adhesive use limited  Intervention: Prevent Infection  Recent Flowsheet Documentation  Taken 6/13/2021 0200 by Tracy Curiel RN  Infection Prevention:  • environmental surveillance performed  • rest/sleep promoted  Taken 6/13/2021 0014 by Tracy Curiel RN  Infection Prevention:  • environmental surveillance performed  • rest/sleep promoted  Taken 6/12/2021 2041 by Tracy Curiel RN  Infection Prevention:  • environmental surveillance performed  • rest/sleep promoted  Goal: Optimal Comfort and Wellbeing  6/13/2021 0248 by Tracy Curiel RN  Outcome: Ongoing, Progressing  6/13/2021 0243 by Tracy Curiel RN  Outcome: Ongoing, Progressing  Goal: Readiness for Transition of Care  6/13/2021 0248 by Tracy Curiel RN  Outcome: Ongoing, Progressing  6/13/2021 0243 by Tracy Curiel RN  Outcome: Ongoing, Progressing     Problem: Fall Injury Risk  Goal: Absence of Fall and Fall-Related Injury  Outcome: Ongoing, Progressing  Intervention: Identify and Manage Contributors to Fall Injury Risk  Recent Flowsheet Documentation  Taken 6/13/2021 0014 by Tracy Curiel RN  Medication Review/Management: medications reviewed  Taken 6/12/2021 2041 by Tracy Curiel RN  Medication  Review/Management: medications reviewed  Intervention: Promote Injury-Free Environment  Recent Flowsheet Documentation  Taken 6/13/2021 0200 by Tracy Curiel RN  Safety Promotion/Fall Prevention:  • fall prevention program maintained  • safety round/check completed  • assistive device/personal items within reach  • clutter free environment maintained  Taken 6/13/2021 0014 by Tracy Curiel RN  Safety Promotion/Fall Prevention:  • activity supervised  • fall prevention program maintained  • nonskid shoes/slippers when out of bed  • safety round/check completed  Taken 6/12/2021 2041 by Tracy Curiel RN  Safety Promotion/Fall Prevention:  • fall prevention program maintained  • safety round/check completed  • activity supervised  • clutter free environment maintained  • assistive device/personal items within reach  • nonskid shoes/slippers when out of bed     Problem: Adjustment to Illness (Stroke, Ischemic/Transient Ischemic Attack)  Goal: Optimal Coping  Outcome: Ongoing, Progressing     Problem: Bowel Elimination Impaired (Stroke, Ischemic/Transient Ischemic Attack)  Goal: Effective Bowel Elimination  Outcome: Ongoing, Progressing     Problem: Cerebral Tissue Perfusion Risk (Stroke, Ischemic/Transient Ischemic Attack)  Goal: Optimal Cerebral Tissue Perfusion  Outcome: Ongoing, Progressing  Intervention: Optimize Oxygenation and Ventilation  Recent Flowsheet Documentation  Taken 6/13/2021 0200 by Tracy Curiel RN  Head of Bed (HOB): HOB elevated  Taken 6/13/2021 0014 by Tracy Curiel RN  Head of Bed (HOB): HOB elevated  Taken 6/12/2021 2041 by Tracy Curiel RN  Head of Bed (HOB): HOB elevated     Problem: Communication Impairment (Stroke, Ischemic/Transient Ischemic Attack)  Goal: Improved Communication Skills  Outcome: Ongoing, Progressing  Intervention: Optimize Cognitive and Communication Skills  Recent Flowsheet Documentation  Taken 6/13/2021 0014 by Tracy Curiel RN  Communication  Enhancement Strategies: call light answered in person  Taken 6/12/2021 2041 by Tracy Curiel RN  Communication Enhancement Strategies: call light answered in person     Problem: Eating/Swallowing Impairment (Stroke, Ischemic/Transient Ischemic Attack)  Goal: Oral Intake without Aspiration  Outcome: Ongoing, Progressing     Problem: Functional Ability Impaired (Stroke, Ischemic/Transient Ischemic Attack)  Goal: Optimal Functional Ability  Outcome: Ongoing, Progressing  Intervention: Optimize Functional Ability  Recent Flowsheet Documentation  Taken 6/12/2021 2041 by Tracy Curiel RN  Activity Management:  • activity adjusted per tolerance  • ambulated in room     Problem: Hemodynamic Instability (Stroke, Ischemic/Transient Ischemic Attack)  Goal: Vital Signs Remain in Desired Range  Outcome: Ongoing, Progressing     Problem: Pain (Stroke, Ischemic/Transient Ischemic Attack)  Goal: Acceptable Pain Control  Outcome: Ongoing, Progressing     Problem: Sensorimotor Impairment (Stroke, Ischemic/Transient Ischemic Attack)  Goal: Improved Sensorimotor Function  Outcome: Ongoing, Progressing  Intervention: Optimize Range of Motion, Motor Control and Function  Recent Flowsheet Documentation  Taken 6/13/2021 0200 by Tracy Curiel RN  Positioning/Transfer Devices:  • pillows  • in use  Taken 6/13/2021 0014 by Tracy Curiel RN  Positioning/Transfer Devices:  • pillows  • in use  Taken 6/12/2021 2041 by Tracy Curiel RN  Positioning/Transfer Devices:  • pillows  • in use     Problem: Urinary Elimination Impaired (Stroke, Ischemic/Transient Ischemic Attack)  Goal: Effective Urinary Elimination  Outcome: Ongoing, Progressing   Goal Outcome Evaluation:  Plan of Care Reviewed With: patient           Outcome Summary: Patient alert and oriented.  NIH performed as ordered, neuro checks completed per MORGAN taylor. Facial droop appears to have resolved.  NIH 1 for dysarthria.  Patient up w/ standby assist to bathroom.   Heart auscaltation regular, pulses regular and strong.  Denies pain.  Education provided.  Resting comfortably.  Fall and safety precautions initiated and intact.

## 2021-06-13 NOTE — CONSULTS
Neurology Consult Note    Referring Provider: Dr. Praveen Leung  Reason for Consultation: TIA      History of present illness:      This is a very nice 76-year-old female with no previous history of TIAs or strokes.  She does have a history of hyperlipidemia and essential tremor treated with deep brain stimulation at [facility].  Yesterday at 12 PM she was last seen normal.  Her  is at the bedside and assist in the history.  Approximately 130 or 2 the patient had a sudden onset of slurred speech, word finding difficulties, drooling from the right side of her mouth, and a discrete numbness in the second digit on her right hand.  She presented to our ER but was subjectively improving rapidly.  Because she was improving rapidly and the increased bleed right as her last seen normal time was above 3 hours and also had a structural abnormality of the DBS in her brain we elected not to proceed with TPA.  The patient was admitted overnight and now has returned back to her baseline state.      Past Medical History:   Diagnosis Date   • Bone/cartilage disorder    • Hyperlipidemia    • Left rotator cuff tear    • PONV (postoperative nausea and vomiting)        No Known Allergies  No current facility-administered medications on file prior to encounter.     Current Outpatient Medications on File Prior to Encounter   Medication Sig   • Calcium-Vitamin D 500-125 MG-UNIT tablet Take 1 tablet by mouth Daily.   • estradiol (Climara) 0.05 MG/24HR patch Place 1 patch on the skin as directed by provider 1 (One) Time Per Week for 90 days.   • Multiple Vitamins-Minerals (MULTIVITAMIN ADULT PO) Take 1 tablet by mouth Daily.   • rosuvastatin (CRESTOR) 10 MG tablet Take 1 tablet by mouth Daily.       Social History     Socioeconomic History   • Marital status:      Spouse name: Not on file   • Number of children: Not on file   • Years of education: Not on file   • Highest education level: Not on file   Tobacco Use    • Smoking status: Never Smoker   • Smokeless tobacco: Never Used   Substance and Sexual Activity   • Alcohol use: No   • Drug use: No   • Sexual activity: Defer     Family History   Problem Relation Age of Onset   • Colon polyps Mother    • Colon cancer Neg Hx        Review of Systems  A 14 point review of systems was reviewed and was negative except for dysarthria    Vital Signs   Temp:  [97.6 °F (36.4 °C)-98 °F (36.7 °C)] 97.6 °F (36.4 °C)  Heart Rate:  [] 67  Resp:  [16-18] 16  BP: (124-172)/(57-84) 129/66    General Exam:  Head:  Normal cephalic, atraumatic  HEENT:  Neck supple  Fundoscopic Exam:  No signs of disc edema  CVS:  Regular rate and rhythm.  No murmurs  Carotid Examination:  No bruits  Lungs:  Clear to auscultation  Abdomen:  Non-tender, Non-distended  Extremities:  No signs of peripheral edema  Skin:  No rashes    Neurologic Exam:    Mental Status:    -Awake, Alert, Oriented X 3  -No word finding difficulties  -No aphasia  -No dysarthria  -Follows simple and complex commands    CN II:  Visual fields full.  Pupils equally reactive to light  CN III, IV, VI:  Extraocular Muscles full with no signs of nystagmus  CN V:  Facial sensory is symmetric with no asymmetries.  CN VII:  Facial motor symmetric  CN VIII:  Gross hearing intact bilaterally  CN IX:  Palate elevates symmetrically  CN X:  Palate elevates symmetrically  CN XI:  Shoulder shrug symmetric  CN XII:  Tongue is midline on protrusion    Motor: (strength out of 5:  1= minimal movement, 2 = movement in plane of gravity, 3 = movement against gravity, 4 = movement against some resistance, 5 = full strength)    -Right Upper Ext: Proximal: 5 Distal: 5  -Left Upper Ext: Proximal: 5 Distal: 5    -Right Lower Ext: Proximal: 5 Distal: 5  -Left Lower Ext: Proximal: 5 Distal: 5    DTR:  -Right   Bicep: 2+ Tricep: 2+ Brachoradialis: 2+   Patella: 2+ Ankle: 2+ Neg Babinski  -Left   Bicep: 2+ Tricep: 2+ Brachoradialis: 2+   Patella: 2+ Ankle: 2+ Neg  Babinski    Sensory:  -Intact to light touch, pinprick, temperature, pain, and proprioception    Coordination:  -Finger to nose intact  -Heel to shin intact  -No ataxia    Gait  -No signs of ataxia  -ambulates unassisted      Results Review:  Lab Results (last 24 hours)     Procedure Component Value Units Date/Time    POC Glucose Once [[id number]]  (Normal) Collected: 06/13/21 0559    Specimen: Blood Updated: 06/13/21 0621     Glucose 89 mg/dL      Comment: : BARBARA Curiel Kavya ID: [id number]       Lipid Panel [[id number]] Collected: 06/13/21 0441    Specimen: Blood Updated: 06/13/21 0543     Total Cholesterol 174 mg/dL      Triglycerides 78 mg/dL      HDL Cholesterol 60 mg/dL      LDL Cholesterol  99 mg/dL      VLDL Cholesterol 15 mg/dL      LDL/HDL Ratio 1.64    Narrative:      Cholesterol Reference Ranges  (U.S. Department of Health and Human Services ATP III Classifications)    Desirable          <200 mg/dL  Borderline High    200-239 mg/dL  High Risk          >240 mg/dL      Triglyceride Reference Ranges  (U.S. Department of Health and Human Services ATP III Classifications)    Normal           <150 mg/dL  Borderline High  150-199 mg/dL  High             200-499 mg/dL  Very High        >500 mg/dL    HDL Reference Ranges  (U.S. Department of Health and Human Services ATP III Classifcations)    Low     <40 mg/dl (major risk factor for CHD)  High    >60 mg/dl ('negative' risk factor for CHD)        LDL Reference Ranges  (U.S. Department of Health and Human Services ATP III Classifcations)    Optimal          <100 mg/dL  Near Optimal     100-129 mg/dL  Borderline High  130-159 mg/dL  High             160-189 mg/dL  Very High        >189 mg/dL    Hemoglobin A1c [[id number]]  (Normal) Collected: 06/13/21 0441    Specimen: Blood Updated: 06/13/21 0534     Hemoglobin A1C 5.30 %     Narrative:      Hemoglobin A1C Ranges:    Increased Risk for Diabetes  5.7% to 6.4%  Diabetes                     >=  6.5%  Diabetic Goal                < 7.0%    POC Glucose Once [[id number]]  (Normal) Collected: 06/13/21 0104    Specimen: Blood Updated: 06/13/21 0125     Glucose 91 mg/dL      Comment: : BARBARA Hoff ID: [id number]       COVID-19,[facility] IN-HOUSE,Nasal Swab No Transport Media 3-4 HR TAT - Swab, Nasal Cavity [[id number]]  (Normal) Collected: 06/12/21 1652    Specimen: Swab from Nasal Cavity Updated: 06/12/21 1744     COVID19 Not Detected    Narrative:      Fact sheet for providers: https://www.fda.gov/media/392368/download     Fact sheet for patients: https://www.fda.gov/media/936186/download    Test performed by PCR.    Consider negative results in combination with clinical observations, patient history, and epidemiological information.    [place] Draw [[id number]] Collected: 06/12/21 1530    Specimen: Blood Updated: 06/12/21 1645    Narrative:      The following orders were created for panel order [place] Draw.  Procedure                               Abnormality         Status                     ---------                               -----------         ------                     Green Top (Gel)[[id number]]                                  Final result               Lavender Top[[id number]]                                     Final result               Red Top[[id number]]                                          Final result                 Please view results for these tests on the individual orders.    Green Top (Gel) [[id number]] Collected: 06/12/21 1530    Specimen: Blood Updated: 06/12/21 1645     Extra Tube Hold for add-ons.     Comment: Auto resulted.       Red Top [[id number]] Collected: 06/12/21 1530    Specimen: Blood Updated: 06/12/21 1645     Extra Tube Hold for add-ons.     Comment: Auto resulted.       Lavender Top [[id number]] Collected: 06/12/21 1530    Specimen: Blood Updated: 06/12/21 1645     Extra Tube hold for add-on     Comment: Auto resulted       POC Glucose Once  [[id number]]  (Normal) Collected: 06/12/21 1602    Specimen: Blood Updated: 06/12/21 1612     Glucose 85 mg/dL      Comment: : [id number] Philippe Crabtreeer ID: [id number]       Comprehensive Metabolic Panel [[id number]]  (Abnormal) Collected: 06/12/21 1530    Specimen: Blood Updated: 06/12/21 1556     Glucose 100 mg/dL      BUN 15 mg/dL      Creatinine 0.68 mg/dL      Sodium 139 mmol/L      Potassium 4.2 mmol/L      Chloride 102 mmol/L      CO2 30.0 mmol/L      Calcium 9.4 mg/dL      Total Protein 7.0 g/dL      Albumin 4.10 g/dL      ALT (SGPT) 15 U/L      AST (SGOT) 20 U/L      Alkaline Phosphatase 76 U/L      Total Bilirubin <0.2 mg/dL      eGFR Non African Amer 84 mL/min/1.73      Globulin 2.9 gm/dL      A/G Ratio 1.4 g/dL      BUN/Creatinine Ratio 22.1     Anion Gap 7.0 mmol/L     Narrative:      GFR Normal >60  Chronic Kidney Disease <60  Kidney Failure <15      Troponin [[id number]]  (Normal) Collected: 06/12/21 1530    Specimen: Blood Updated: 06/12/21 1554     Troponin T <0.010 ng/mL     Narrative:      Troponin T Reference Range:  <= 0.03 ng/mL-   Negative for AMI  >0.03 ng/mL-     Abnormal for myocardial necrosis.  Clinicians would have to utilize clinical acumen, EKG, Troponin and serial changes to determine if it is an Acute Myocardial Infarction or myocardial injury due to an underlying chronic condition.       Results may be falsely decreased if patient taking Biotin.      Protime-INR [[id number]]  (Normal) Collected: 06/12/21 1531    Specimen: Blood Updated: 06/12/21 1548     Protime 12.0 Seconds      INR 0.96    aPTT [[id number]]  (Normal) Collected: 06/12/21 1531    Specimen: Blood Updated: 06/12/21 1548     PTT 32.3 seconds     CBC & Differential [[id number]]  (Normal) Collected: 06/12/21 1530    Specimen: Blood Updated: 06/12/21 1539    Narrative:      The following orders were created for panel order CBC & Differential.  Procedure                               Abnormality         Status                      ---------                               -----------         ------                     CBC Auto Differential[[id number]]        Normal              Final result                 Please view results for these tests on the individual orders.    CBC Auto Differential [[id number]]  (Normal) Collected: 06/12/21 1530    Specimen: Blood Updated: 06/12/21 1539     WBC 6.99 10*3/mm3      RBC 4.24 10*6/mm3      Hemoglobin 13.5 g/dL      Hematocrit 40.8 %      MCV 96.2 fL      MCH 31.8 pg      MCHC 33.1 g/dL      RDW 12.8 %      RDW-SD 45.4 fl      MPV 9.9 fL      Platelets 265 10*3/mm3      Neutrophil % 58.1 %      Lymphocyte % 31.9 %      Monocyte % 7.7 %      Eosinophil % 1.9 %      Basophil % 0.3 %      Immature Grans % 0.1 %      Neutrophils, Absolute 4.06 10*3/mm3      Lymphocytes, Absolute 2.23 10*3/mm3      Monocytes, Absolute 0.54 10*3/mm3      Eosinophils, Absolute 0.13 10*3/mm3      Basophils, Absolute 0.02 10*3/mm3      Immature Grans, Absolute 0.01 10*3/mm3      nRBC 0.0 /100 WBC           .  Imaging Results (Last 24 Hours)     Procedure Component Value Units Date/Time    CT CEREBRAL PERFUSION WITH & WITHOUT CONTRAST [[id number]] Collected: 06/12/21 1615     Updated: 06/12/21 1756    Narrative:      Indication: Code stroke, slurred speech, left-sided facial droop     Exam:      1. Perfusion CT is performed to acquire images tracking the temporal  course of iodinated contrast material passing through the cerebral  circulation. Perfusion parameters, such as cerebral blood flow (CBF),  cerebral blood volume (CBV), mean transit time (MTT), etc. are  calculated by [employer] with additional provided perfusion maps and  estimated stroke volumes.  2. Automated exposure control (AEC) protocols are utilized on the  scanner to ensure dose lowered technique.      Comparison: Noncontrast CT brain and brain angiogram dated 6/12/2021  3:44 PM CDT     Findings:     Tmax >6.0 seconds volume: 6 seconds ml     CBF < 30% volume: 0  mL ml     Mismatch volume: 13 ml      Mismatch ratio: infinite       Impression:      Impression:  1. Images sent to [facility] for interpretation.  This report was finalized on 06/12/2021 17:48 by Dr. Sim Andrade MD.    XR Chest 1 View [[id number]] Collected: 06/12/21 1715     Updated: 06/12/21 1719    Narrative:      EXAMINATION: XR CHEST 1 VW-. 6/12/2021 5:15 PM CDT     CHEST, ONE VIEW:     HISTORY: Stroke     COMPARISON: 7/8/2011     A single frontal chest radiograph was obtained.     FINDINGS:     The lungs are clear without acute infiltrates.     The heart is normal in size, pulmonary circulation appropriate, without  heart failure.     The intracranial electronic stimulator generators project over the upper  chest.     Changes from left reverse shoulder arthroplasty observed.     Changes from anterior cervical fusion plating identified.     No acute osseous abnormalities observed.                                     Impression:      1. No acute cardiopulmonary process.     This report was finalized on 06/12/2021 17:16 by Dr. Sim Andrade MD.    CT Angiogram Head w AI Analysis of LVO [[id number]] Collected: 06/12/21 1556     Updated: 06/12/21 1606    Narrative:      EXAMINATION:  CT ANGIOGRAM HEAD W AI ANALYSIS OF LVO-  6/12/2021 3:35 PM  CDT     HISTORY: Stroke, follow up      COMPARISON: Head CT and neck CT angiogram performed today     TECHNIQUE:      CT angiography head was performed.     Radiation dose equals  mGy-cm.  Automated exposure control dose  reduction technique was implemented.     Thin section axial imaging was obtained with intravenous contrast. 2-D  sagittal and coronal reconstruction images were generated.     Multi projection Maximum intensity projection angiographic imaging  obtained using volume rendering technique.     AI ANALYSIS OF LVO WAS UTILIZED.        NASCET criteria was used for the angiographic calculation of diameter  stenosis.     FINDINGS: The distal vertebral arteries  are imaged symmetrically. The  basilar artery is intact.     Posterior fossa vessels are imaged symmetrically.     The distal internal carotid arteries are intact. There is mild calcified  atheromatous plaque involving the cavernous and supraclinoid internal  carotid arteries.     The anterior cerebral arteries are imaged symmetrically without  steno-occlusive changes.     The right and left middle cerebral arteries are also imaged  symmetrically without steno-occlusive changes or large vessel  occlusions.     Posterior cerebral arteries are intact symmetrically without  steno-occlusive change.     The vessels comprising the [place] are intact.     There is no cerebral aneurysm.     The dural venous sinuses enhance appropriately without thrombus.       Impression:      1. Mild calcified atheromatous plaque in the cavernous and supraclinoid  internal carotid arteries without hemodynamic significant  steno-occlusive disease.  2. Otherwise negative CT angiography of the head, without aneurysm,  dissection or significant steno-occlusive changes or large vessel  occlusions..  This report was finalized on 06/12/2021 16:03 by Dr. Sim Andrade MD.    CT Angiogram Neck [[id number]] Collected: 06/12/21 1550     Updated: 06/12/21 1558    Narrative:      EXAMINATION:  CT ANGIOGRAM NECK-  6/12/2021 3:35 PM CDT     HISTORY: Stroke, follow up      COMPARISON: Head CT performed earlier today     TECHNIQUE: CT angiography neck was performed. Radiation dose equals DLP  164 mGy-cm.  Automated exposure control dose reduction technique was  implemented.     Thin section axial imaging was obtained with intravenous contrast. 2-D  sagittal and coronal reconstruction images were generated.     Multi projection Angiographic imaging obtained using a maximum intensity  projection technique.     NASCET criteria was used for the angiographic calculation of diameter  stenosis.     FINDINGS:      The brachiocephalic vessels arising from  the aortic arch are widely  patent. Bovine arch is appreciated, normal variant.     The right common carotid artery is widely patent.     The right carotid bifurcation is widely patent.     The right internal carotid artery is ectatic.     The right ICA is widely patent without steno-occlusive changes.     The left common carotid artery is widely patent.     The left carotid bifurcation is patent without steno-occlusive changes.     The left internal carotid artery is ectatic without stenosis.     External carotid arteries are patent bilaterally without steno-occlusive  changes.     The vertebral arteries are intact bilaterally and patent without  steno-occlusive change.     There is no CT angiographic evidence of dissection or aneurysm.     Fibronodular and cystic changes appreciated in the lung apices  compatible chronic change..       Impression:      1. Negative CT angiography of the neck.  This report was finalized on 06/12/2021 15:55 by Dr. Sim Andrade MD.    CT Head Without Contrast Stroke Protocol [[id number]] Collected: 06/12/21 1542     Updated: 06/12/21 1553    Narrative:      EXAMINATION: CT HEAD WO CONTRAST STROKE PROTOCOL-  6/12/2021 3:42 PM CDT     CT SCAN OF THE HEAD, WITHOUT CONTRAST:      HISTORY: Stroke symptoms     COMPARISONS: None      TECHNIQUE:     Radiation dose equals  mGy-cm.  Automated exposure control dose  reduction technique was implemented.        CT evaluation of the head without intravenous contrast. 5 mm transaxial  images were obtained.   2-D sagittal and coronal reconstruction images  were generated.     FINDINGS:      There are bilateral basal ganglia thalamic electrodes appreciated from a  frontal approach with significant associated metallic artifact.     There is significant motion artifact.     There is central and cortical atrophy.     There is low-attenuation in the periventricular and subcortical white  matter compatible with chronic ischemic changes.     There is  no definite intra or extra-axial hemorrhage.     There is no mass effect or midline shift.     There is no hydrocephalus.     There is no skull fracture or acute calvarial abnormality.     Paranasal sinuses imaged part are grossly clear.     These findings were discussed with Dr. Bernardo Bajwa, emergency room  physician, at the time of dictation 3:45 PM, 6/12/2021.       Impression:      1. Significant Image quality degradation associated with bilateral basal  ganglia/thalamic electrodes and motion artifact.  2. Atrophy and chronic ischemic white matter changes.  3. No definite CT evidence of acute intracranial process.           This report was finalized on 06/12/2021 15:49 by Dr. Sim Andrade MD.        Reviewed CT of head without contrast, CT angiography of head and neck, and CT perfusion.      Impression    • Transit ischemic attack  • History of essential tremor status post deep brain stimulation  • Patient does not warrant MRI secondary to deep brain stimulation  • Hyperlipidemia with LDL of 99 and goal is less than 70    Plan    • Repeat head CT in a.m.  • Cardiac echo pending  • Carotid ultrasound not required secondary to CT angiography  • Start aspirin 81 mg  • Start Lipitor 40 mg    I discussed the patients findings and my recommendations with patient and family    Tristen Lee MD  06/13/21  10:04 CDT

## 2021-06-13 NOTE — NURSING NOTE
Attempted to perform EKG per MD order, artifact present in leads V1-V6, patient noted to have deep brain stimulator implanted in chest.  Patient states she does not have magnet to turn stimulator off, called , to bring in AM.  Will attempt EKG again when magnet present.    Called from MRI, states will be unable to perform MRI d/t stimulator.  Needs more information, states a specific practitioner will need to be present to reset stimulator, and may not be able to complete for patient safety at this facility.  MRI to follow up in the AM.

## 2021-06-14 ENCOUNTER — APPOINTMENT (OUTPATIENT)
Dept: CT IMAGING | Facility: HOSPITAL | Age: 76
End: 2021-06-14

## 2021-06-14 ENCOUNTER — READMISSION MANAGEMENT (OUTPATIENT)
Dept: CALL CENTER | Facility: HOSPITAL | Age: 76
End: 2021-06-14

## 2021-06-14 VITALS
BODY MASS INDEX: 18.43 KG/M2 | HEART RATE: 78 BPM | WEIGHT: 104 LBS | DIASTOLIC BLOOD PRESSURE: 62 MMHG | OXYGEN SATURATION: 96 % | SYSTOLIC BLOOD PRESSURE: 117 MMHG | RESPIRATION RATE: 16 BRPM | HEIGHT: 63 IN | TEMPERATURE: 98.2 F

## 2021-06-14 PROCEDURE — G0378 HOSPITAL OBSERVATION PER HR: HCPCS

## 2021-06-14 PROCEDURE — 99217 PR OBSERVATION CARE DISCHARGE MANAGEMENT: CPT | Performed by: FAMILY MEDICINE

## 2021-06-14 PROCEDURE — 70450 CT HEAD/BRAIN W/O DYE: CPT

## 2021-06-14 PROCEDURE — 99214 OFFICE O/P EST MOD 30 MIN: CPT | Performed by: CLINICAL NURSE SPECIALIST

## 2021-06-14 RX ORDER — ATORVASTATIN CALCIUM 40 MG/1
40 TABLET, FILM COATED ORAL NIGHTLY
Qty: 30 TABLET | Refills: 5 | Status: SHIPPED | OUTPATIENT
Start: 2021-06-14 | End: 2021-10-04 | Stop reason: SDUPTHER

## 2021-06-14 RX ORDER — ASPIRIN 81 MG/1
81 TABLET, CHEWABLE ORAL DAILY
Qty: 30 TABLET | Refills: 5 | Status: SHIPPED | OUTPATIENT
Start: 2021-06-15

## 2021-06-14 RX ADMIN — SODIUM CHLORIDE, PRESERVATIVE FREE 10 ML: 5 INJECTION INTRAVENOUS at 08:26

## 2021-06-14 RX ADMIN — ASPIRIN 81 MG: 81 TABLET, CHEWABLE ORAL at 08:26

## 2021-06-14 NOTE — PROGRESS NOTES
Neurology Progress Note      Chief Complaint:  F/u TIA    Subjective     Subjective:  Patient ambulating about room unassisted. Gait steady. No family/visitors in room. No further episodes of impaired speech or right sided weakness. Patient admits today that she had taken OTC topical treatment for hot flashes but has thrown them away.    Repeat CT head this AM stable with no acute finding.       Results for orders placed during the hospital encounter of 06/12/21    Adult Transthoracic Echo Complete W/ Cont if Necessary Per Protocol (With Agitated Saline)    Interpretation Summary  · Left ventricular ejection fraction appears to be greater than 70%. Left ventricular systolic function is hyperdynamic (EF > 70%).  · Left ventricular diastolic function was normal.  · Cannot exclude the presence of a patent foramen ovale. Cannot exclude the presence of an atrial septal defect. Saline test for shunting was performed and was inconclusive for atrial level shunt.  · There is mild calcification of the aortic valve mainly affecting the right coronary cusp(s). There is mild thickening of the right coronary cusp(s) of the aortic valve. No aortic valve regurgitation is present. No hemodynamically significant aortic valve stenosis is present.  · Mild mitral valve regurgitation is present.  · No evidence of pulmonary hypertension is present.  · If there is concern for an intra-atrial level shunt, recommend proceeding with transesophageal echocardiogram to further assess this possibility.   Medications:  Current Facility-Administered Medications   Medication Dose Route Frequency Provider Last Rate Last Admin   • aspirin chewable tablet 81 mg  81 mg Oral Daily Praveen Leung MD   81 mg at 06/14/21 0826    Or   • aspirin suppository 300 mg  300 mg Rectal Daily Praveen Leung MD       • atorvastatin (LIPITOR) tablet 40 mg  40 mg Oral Nightly Tristen Lee MD       • sodium chloride 0.9 % flush 10 mL  10 mL  Intravenous PRN Praveen Leung MD       • sodium chloride 0.9 % flush 10 mL  10 mL Intravenous Q12H Praveen Leung MD   10 mL at 06/14/21 0826   • sodium chloride 0.9 % flush 10 mL  10 mL Intravenous PRN Praveen Leung MD           Review of Systems:   -A 14 point review of systems is completed and is negative except for tremor.       Objective      Vital Signs  Temp:  [97.6 °F (36.4 °C)-98.2 °F (36.8 °C)] 98.2 °F (36.8 °C)  Heart Rate:  [64-78] 78  Resp:  [16-18] 16  BP: (117-137)/(62-82) 117/62    Physical Exam:  General Exam:  Head:  Normal cephalic, atraumatic  HEENT:  Neck supple  Fundoscopic Exam:  No signs of disc edema  CVS:  Regular rate and rhythm.  No murmurs  Carotid Examination:  No bruits  Lungs:  Clear to auscultation  Abdomen:  Non-tender, Non-distended  Extremities:  No signs of peripheral edema  Skin:  No rashes     Neurologic Exam:     Mental Status:    -Awake, Alert, Oriented X 3  -No word finding difficulties  -No aphasia  -No dysarthria  -Follows simple and complex commands     CN II:  Visual fields full.  Pupils equally reactive to light  CN III, IV, VI:  Extraocular Muscles full with no signs of nystagmus  CN V:  Facial sensory is symmetric with no asymmetries.  CN VII:  Facial motor symmetric  CN VIII:  Gross hearing intact bilaterally  CN IX:  Palate elevates symmetrically  CN X:  Palate elevates symmetrically  CN XI:  Shoulder shrug symmetric  CN XII:  Tongue is midline on protrusion     Motor: (strength out of 5:  1= minimal movement, 2 = movement in plane of gravity, 3 = movement against gravity, 4 = movement against some resistance, 5 = full strength)     -Right Upper Ext: Proximal: 5 Distal: 5  -Left Upper Ext: Proximal: 5   Distal: 5     -Right Lower Ext: Proximal: 5 Distal: 5  -Left Lower Ext: Proximal: 5   Distal: 5     DTR:  -2+ throughout     Sensory:  -Intact to light touch, pinprick, temperature, pain, and proprioception     Coordination:  -Finger to nose  intact  -Heel to shin intact  -No ataxia     Gait  -No signs of ataxia  -ambulates unassisted           Results Review:    I reviewed the patient's new clinical results.    Results from last 7 days   Lab Units 06/12/21  1530   WBC 10*3/mm3 6.99   HEMOGLOBIN g/dL 13.5   HEMATOCRIT % 40.8   PLATELETS 10*3/mm3 265        Results from last 7 days   Lab Units 06/12/21  1530   SODIUM mmol/L 139   POTASSIUM mmol/L 4.2   CHLORIDE mmol/L 102   CO2 mmol/L 30.0*   BUN mg/dL 15   CREATININE mg/dL 0.68   CALCIUM mg/dL 9.4   BILIRUBIN mg/dL <0.2   ALK PHOS U/L 76   ALT (SGPT) U/L 15   AST (SGOT) U/L 20   GLUCOSE mg/dL 100*        Lab Results   Component Value Date    MG 4.3 02/23/2018    PROTIME 12.0 06/12/2021    INR 0.96 06/12/2021     No components found for: POCGLUC  No components found for: A1C  Lab Results   Component Value Date    HDL 60 06/13/2021    LDL 99 06/13/2021     No components found for: B12  Lab Results   Component Value Date    TSH 3.010 06/03/2021       Assessment/Plan     Hospital Problem List      Acute right-sided weakness    Impression:  · Transit ischemic attack  · History of essential tremor status post deep brain stimulation  · Patient does not warrant MRI secondary to deep brain stimulation  · Hyperlipidemia with LDL of 99 and goal is less than 70  · Patient had taken OTC treatment for hot flashes but since admission has thrown them away and recommend to remain off use.    Plan:  1. TTE could not exclude presence of PFO. No need for MELANY at this time.  2. ASA 81 mg daily.  3. Lipitor 40 mg daily for LDL goal less than 70.  4. PT is recommending outpatient therapy for imbalance. Will defer to attending.  5. Ok to discharge from neurology stand point.  6. F/u neurology in 4 weeks.   7. Remain off HRT.             Swati Amaya, APRN  06/14/21  14:49 CDT

## 2021-06-14 NOTE — PROGRESS NOTES
"CC:\"im doing fine\"--no new weakness or nursing staff concners    Review of Systems   Constitutional: Negative.    HENT: Negative.    Eyes: Negative.    Respiratory: Negative.    Cardiovascular: Negative.    Gastrointestinal: Negative.    Endocrine: Negative.    Genitourinary: Negative.    Musculoskeletal: Negative.    Skin: Negative.    Allergic/Immunologic: Negative.    Neurological: Negative.    Hematological: Negative.    Psychiatric/Behavioral: Negative.      Temp:  [97.6 °F (36.4 °C)-98.2 °F (36.8 °C)] 97.9 °F (36.6 °C)  Heart Rate:  [64-73] 73  Resp:  [18] 18  BP: (119-137)/(64-82) 130/68  No intake/output data recorded.  No intake/output data recorded.    Physical Exam  Vitals and nursing note reviewed.   Constitutional:       Appearance: Normal appearance. She is normal weight.   HENT:      Head: Normocephalic and atraumatic.      Nose: Nose normal.      Mouth/Throat:      Mouth: Mucous membranes are moist.      Pharynx: Oropharynx is clear.   Eyes:      Extraocular Movements: Extraocular movements intact.      Conjunctiva/sclera: Conjunctivae normal.      Pupils: Pupils are equal, round, and reactive to light.   Cardiovascular:      Rate and Rhythm: Normal rate and regular rhythm.      Pulses: Normal pulses.      Heart sounds: Normal heart sounds.   Pulmonary:      Effort: Pulmonary effort is normal.      Breath sounds: Normal breath sounds.   Abdominal:      General: Abdomen is flat. Bowel sounds are normal.      Palpations: Abdomen is soft.   Musculoskeletal:         General: Normal range of motion.      Cervical back: Normal range of motion and neck supple.   Skin:     General: Skin is warm and dry.      Capillary Refill: Capillary refill takes less than 2 seconds.   Neurological:      General: No focal deficit present.      Mental Status: She is alert and oriented to person, place, and time. Mental status is at baseline.   Psychiatric:         Behavior: Behavior normal.         Thought Content: Thought " content normal.         Judgment: Judgment normal.           Acute right-sided weakness    Repeat ct scan of head tomorrow per dr bojorquez

## 2021-06-14 NOTE — PLAN OF CARE
Goal Outcome Evaluation:           Progress: improving  Outcome Summary: Pt A&Ox4. VSS. NIH: 1 for some expressive aphasia. No neuro changes noted. Up x1 standby assist. Family at bedside, assists patient. Voiding. SCDs.  DC plans in progress. Safety maintained, will continue to monitor.

## 2021-06-14 NOTE — PLAN OF CARE
Problem: Adult Inpatient Plan of Care  Goal: Plan of Care Review  Outcome: Ongoing, Progressing  Flowsheets (Taken 6/14/2021 0454)  Progress: improving  Plan of Care Reviewed With: patient  Goal: Patient-Specific Goal (Individualized)  Outcome: Ongoing, Progressing  Goal: Absence of Hospital-Acquired Illness or Injury  Outcome: Ongoing, Progressing  Intervention: Identify and Manage Fall Risk  Recent Flowsheet Documentation  Taken 6/14/2021 0015 by Tracy Curiel RN  Safety Promotion/Fall Prevention:  • safety round/check completed  • fall prevention program maintained  Taken 6/13/2021 2200 by Tracy Curiel RN  Safety Promotion/Fall Prevention:  • fall prevention program maintained  • safety round/check completed  Taken 6/13/2021 2000 by Tracy Curiel RN  Safety Promotion/Fall Prevention:  • fall prevention program maintained  • safety round/check completed  Intervention: Prevent Skin Injury  Recent Flowsheet Documentation  Taken 6/14/2021 0015 by Tracy Curiel RN  Body Position: position changed independently  Taken 6/13/2021 2000 by Tracy Curiel RN  Body Position: position changed independently  Skin Protection:  • adhesive use limited  • tubing/devices free from skin contact  Intervention: Prevent Infection  Recent Flowsheet Documentation  Taken 6/14/2021 0015 by Tracy Curiel RN  Infection Prevention:  • environmental surveillance performed  • rest/sleep promoted  Taken 6/13/2021 2000 by Tracy Curiel RN  Infection Prevention:  • environmental surveillance performed  • rest/sleep promoted  Goal: Optimal Comfort and Wellbeing  Outcome: Ongoing, Progressing  Goal: Readiness for Transition of Care  Outcome: Ongoing, Progressing     Problem: Fall Injury Risk  Goal: Absence of Fall and Fall-Related Injury  Outcome: Ongoing, Progressing  Intervention: Identify and Manage Contributors to Fall Injury Risk  Recent Flowsheet Documentation  Taken 6/13/2021 2000 by Tracy Curiel  RN  Medication Review/Management: medications reviewed  Intervention: Promote Injury-Free Environment  Recent Flowsheet Documentation  Taken 6/14/2021 0015 by Tracy Curiel RN  Safety Promotion/Fall Prevention:  • safety round/check completed  • fall prevention program maintained  Taken 6/13/2021 2200 by Tracy Curiel RN  Safety Promotion/Fall Prevention:  • fall prevention program maintained  • safety round/check completed  Taken 6/13/2021 2000 by Tracy Curiel RN  Safety Promotion/Fall Prevention:  • fall prevention program maintained  • safety round/check completed     Problem: Adjustment to Illness (Stroke, Ischemic/Transient Ischemic Attack)  Goal: Optimal Coping  Outcome: Ongoing, Progressing     Problem: Bowel Elimination Impaired (Stroke, Ischemic/Transient Ischemic Attack)  Goal: Effective Bowel Elimination  Outcome: Ongoing, Progressing     Problem: Cerebral Tissue Perfusion Risk (Stroke, Ischemic/Transient Ischemic Attack)  Goal: Optimal Cerebral Tissue Perfusion  Outcome: Ongoing, Progressing  Intervention: Optimize Oxygenation and Ventilation  Recent Flowsheet Documentation  Taken 6/14/2021 0015 by Tracy Curiel RN  Head of Bed (HOB): HOB at 30-45 degrees  Taken 6/13/2021 2000 by Tracy Curiel RN  Head of Bed (HOB): HOB elevated     Problem: Communication Impairment (Stroke, Ischemic/Transient Ischemic Attack)  Goal: Improved Communication Skills  Outcome: Ongoing, Progressing     Problem: Eating/Swallowing Impairment (Stroke, Ischemic/Transient Ischemic Attack)  Goal: Oral Intake without Aspiration  Outcome: Ongoing, Progressing     Problem: Functional Ability Impaired (Stroke, Ischemic/Transient Ischemic Attack)  Goal: Optimal Functional Ability  Outcome: Ongoing, Progressing  Intervention: Optimize Functional Ability  Recent Flowsheet Documentation  Taken 6/14/2021 0015 by Tracy Curiel RN  Activity Management: ambulated to bathroom     Problem: Hemodynamic Instability  (Stroke, Ischemic/Transient Ischemic Attack)  Goal: Vital Signs Remain in Desired Range  Outcome: Ongoing, Progressing     Problem: Pain (Stroke, Ischemic/Transient Ischemic Attack)  Goal: Acceptable Pain Control  Outcome: Ongoing, Progressing     Problem: Sensorimotor Impairment (Stroke, Ischemic/Transient Ischemic Attack)  Goal: Improved Sensorimotor Function  Outcome: Ongoing, Progressing  Intervention: Optimize Range of Motion, Motor Control and Function  Recent Flowsheet Documentation  Taken 6/14/2021 0015 by Tracy Curiel RN  Positioning/Transfer Devices:  • pillows  • in use  Taken 6/13/2021 2000 by Tracy Curiel RN  Positioning/Transfer Devices:  • pillows  • in use  Intervention: Optimize Sensory and Perceptual Abilities  Recent Flowsheet Documentation  Taken 6/13/2021 2000 by Tracy Curiel RN  Pressure Reduction Techniques: frequent weight shift encouraged  Pressure Reduction Devices: pressure-redistributing mattress utilized     Problem: Urinary Elimination Impaired (Stroke, Ischemic/Transient Ischemic Attack)  Goal: Effective Urinary Elimination  Outcome: Ongoing, Progressing   Goal Outcome Evaluation:  Plan of Care Reviewed With: patient        Progress: improving    Patient noted to be intermittently impulsive, getting up OOB on own when needing to use bathroom, using call light reinforced, bed alarm is on.  Plan for carotid US and repeat head Outcome Summary: Patient alert and oriented.  NIH performed as ordered, neuro checks completed per MORGAN taylor. Facial droop appears to have resolved.  NIH 1 for expressive aphasia.  Patient up w/ standby assist to bathroom.  Heart auscaltation regular, pulses regular and strong.  Denies pain.  Education provided.  Resting comfortably.  Fall and safety precautions initiated and intact.CT today.

## 2021-06-15 ENCOUNTER — TRANSITIONAL CARE MANAGEMENT TELEPHONE ENCOUNTER (OUTPATIENT)
Dept: CALL CENTER | Facility: HOSPITAL | Age: 76
End: 2021-06-15

## 2021-06-15 DIAGNOSIS — F41.9 ANXIETY: Primary | ICD-10-CM

## 2021-06-15 RX ORDER — LORAZEPAM 0.5 MG/1
0.5 TABLET ORAL DAILY PRN
Qty: 5 TABLET | Refills: 0 | Status: SHIPPED | OUTPATIENT
Start: 2021-06-15 | End: 2021-06-22

## 2021-06-15 NOTE — OUTREACH NOTE
Prep Survey      Responses   [facility] facility patient discharged from?  [facility]   Is LACE score < 7 ?  Yes   Emergency Room discharge w/ pulse ox?  No   Eligibility  [facility]   Date of Admission  06/12/21   Date of Discharge  06/14/21   Discharge Disposition  Home or Self Care   Discharge diagnosis  TIA   Does the patient have one of the following disease processes/diagnoses(primary or secondary)?  Stroke (TIA)   Does the patient have Home health ordered?  No   Is there a DME ordered?  No   Prep survey completed?  Yes          Giulia Garvey RN

## 2021-06-15 NOTE — TELEPHONE ENCOUNTER
Pt  called and stated that she got dc from hospital last and she has been so confused about what is going on and with new meds ect he said that she is driving him crazy she said that her mind is getting really bad? [phone]

## 2021-06-15 NOTE — OUTREACH NOTE
Call Center TCM Note      Responses   [facility] [place] patient discharged from?  [place]   Does the patient have one of the following disease processes/diagnoses(primary or secondary)?  Stroke (TIA)   TCM attempt successful?  Yes   Call start time  1110   Call end time  1120   Discharge diagnosis  TIA   Meds reviewed with patient/caregiver?  Yes   Is the patient having any side effects they believe may be caused by any medication additions or changes?  No   Does the patient have all medications ordered at discharge?  Yes   Is the patient taking all medications as directed (includes completed medication regime)?  Yes   Does the patient have a primary care provider?   Yes   Does the patient have an appointment with their PCP within 7 days of discharge?  Yes   Comments regarding PCP  Patient has a scheduled hospital followup with Dr Leung on 6/22/2021   Has the patient kept scheduled appointments due by today?  N/A   Has home health visited the patient within 72 hours of discharge?  N/A   Does the patient require any assistance with activities of daily living such as eating, bathing, dressing, walking, etc.?  No   Does the patient have any residual symptoms from stroke/TIA?  No   Does the patient understand the diet ordered at discharge?  Yes   Did the patient receive a copy of their discharge instructions?  Yes   Nursing interventions  Reviewed instructions with patient   What is the patient's perception of their health status since discharge?  Improving   Nursing interventions  Nurse provided patient education   Is the patient able to teach back FAST for Stroke?  Yes   Is the patient/caregiver able to teach back the risk factors for a stroke?  High blood pressure-goal below 120/80, High Cholesterol   Is the patient/caregiver able to teach back signs and symptoms related to disease process for when to call PCP?  Yes   Is the patient/caregiver able to teach back signs and symptoms related to disease process for when  to call 911?  Yes   If the patient is a current smoker, are they able to teach back resources for cessation?  Not a smoker   Is the patient/caregiver able to teach back the hierarchy of who to call/visit for symptoms/problems? PCP, Specialist, Home health nurse, Urgent Care, ED, 911  Yes   TCM call completed?  Yes          Charles Blandon RN    6/15/2021, 11:20 EDT       Counseling Text: I reviewed the side effect in detail. Patient should get monthly blood tests, not donate blood, not drive at night if vision affected, and not share medication.

## 2021-06-22 ENCOUNTER — OFFICE VISIT (OUTPATIENT)
Dept: FAMILY MEDICINE CLINIC | Facility: CLINIC | Age: 76
End: 2021-06-22

## 2021-06-22 VITALS
RESPIRATION RATE: 16 BRPM | HEIGHT: 63 IN | OXYGEN SATURATION: 96 % | BODY MASS INDEX: 18.43 KG/M2 | DIASTOLIC BLOOD PRESSURE: 80 MMHG | WEIGHT: 104 LBS | SYSTOLIC BLOOD PRESSURE: 144 MMHG | HEART RATE: 109 BPM

## 2021-06-22 DIAGNOSIS — G45.9 TIA (TRANSIENT ISCHEMIC ATTACK): Primary | ICD-10-CM

## 2021-06-22 PROCEDURE — 99213 OFFICE O/P EST LOW 20 MIN: CPT | Performed by: FAMILY MEDICINE

## 2021-06-22 NOTE — PROGRESS NOTES
Subjective   Lavinia Aguilar is a 76 y.o. female.     Chief Complaint   Patient presents with   • Hospital Follow Up Visit       History of Present Illness     recently admitted for tia---we increased statin and asa---she stopped her hormones and denies any hot flahses      Current Outpatient Medications:   •  aspirin 81 MG chewable tablet, Chew 1 tablet Daily., Disp: 30 tablet, Rfl: 5  •  atorvastatin (LIPITOR) 40 MG tablet, Take 1 tablet by mouth Every Night., Disp: 30 tablet, Rfl: 5  •  Calcium-Vitamin D 500-125 MG-UNIT tablet, Take 1 tablet by mouth Daily., Disp: , Rfl:   •  Multiple Vitamins-Minerals (MULTIVITAMIN ADULT PO), Take 1 tablet by mouth Daily., Disp: , Rfl:   No Known Allergies    Past Medical History:   Diagnosis Date   • Bone/cartilage disorder    • Hyperlipidemia    • Left rotator cuff tear    • PONV (postoperative nausea and vomiting)      Past Surgical History:   Procedure Laterality Date   • CARPAL TUNNEL RELEASE     • CERVICAL SPINE SURGERY     • COLONOSCOPY     • COLONOSCOPY  06/19/2013    internal hemorrhoid   • COLONOSCOPY N/A 7/11/2018    Procedure: COLONOSCOPY WITH ANESTHESIA;  Surgeon: Andrew Palm MD;  Location: [facility] ENDOSCOPY;  Service: Gastroenterology   • HAMMER TOE REPAIR     • HYSTERECTOMY     • INSERTION / REMOVAL CRANIAL DBS GENERATOR     • KNEE CARTILAGE SURGERY     • NASAL SEPTUM SURGERY     • RECTAL SURGERY      rectocele   • SHOULDER ROTATOR CUFF REPAIR Left 02/2018   • SHOULDER SURGERY Right    • TONSILLECTOMY     • TUBAL ABDOMINAL LIGATION         Review of Systems   Constitutional: Negative.    HENT: Negative.    Eyes: Negative.    Respiratory: Negative.    Cardiovascular: Negative.    Gastrointestinal: Negative.    Endocrine: Negative.    Genitourinary: Negative.    Musculoskeletal: Negative.    Skin: Negative.    Allergic/Immunologic: Negative.    Neurological: Negative.    Hematological: Negative.    Psychiatric/Behavioral: Negative.        Objective  /80   " Pulse 109   Resp 16   Ht 160 cm (63\")   Wt 47.2 kg (104 lb)   SpO2 96%   BMI 18.42 kg/m²   Physical Exam  Vitals and nursing note reviewed.   Constitutional:       Appearance: Normal appearance. She is normal weight.   HENT:      Head: Normocephalic and atraumatic.      Nose: Nose normal.      Mouth/Throat:      Mouth: Mucous membranes are moist.      Pharynx: Oropharynx is clear.   Eyes:      Extraocular Movements: Extraocular movements intact.      Conjunctiva/sclera: Conjunctivae normal.      Pupils: Pupils are equal, round, and reactive to light.   Cardiovascular:      Rate and Rhythm: Normal rate and regular rhythm.      Pulses: Normal pulses.      Heart sounds: Normal heart sounds.   Pulmonary:      Effort: Pulmonary effort is normal.      Breath sounds: Normal breath sounds.   Abdominal:      General: Abdomen is flat. Bowel sounds are normal.      Palpations: Abdomen is soft.   Musculoskeletal:         General: Normal range of motion.      Cervical back: Normal range of motion and neck supple.   Skin:     General: Skin is warm and dry.      Capillary Refill: Capillary refill takes less than 2 seconds.   Neurological:      General: No focal deficit present.      Mental Status: She is alert and oriented to person, place, and time. Mental status is at baseline.   Psychiatric:         Behavior: Behavior normal.         Thought Content: Thought content normal.         Judgment: Judgment normal.         Assessment/Plan   Diagnoses and all orders for this visit:    1. TIA (transient ischemic attack) (Primary)      im glad she is better ---she tommy continue          No orders of the defined types were placed in this encounter.      Follow up: 4 month(s)  "

## 2021-06-23 ENCOUNTER — READMISSION MANAGEMENT (OUTPATIENT)
Dept: CALL CENTER | Facility: HOSPITAL | Age: 76
End: 2021-06-23

## 2021-06-23 NOTE — OUTREACH NOTE
Stroke Week 2 Survey      Responses   [facility] patient discharged from?  [place]   Does the patient have one of the following disease processes/diagnoses(primary or secondary)?  Stroke (TIA)   Week 2 attempt successful?  No   Unsuccessful attempts  Attempt 1          Dariana Jones RN

## 2021-06-25 ENCOUNTER — READMISSION MANAGEMENT (OUTPATIENT)
Dept: CALL CENTER | Facility: HOSPITAL | Age: 76
End: 2021-06-25

## 2021-06-25 NOTE — OUTREACH NOTE
Stroke Week 2 Survey      Responses   [facility] patient discharged from?  [place]   Does the patient have one of the following disease processes/diagnoses(primary or secondary)?  Stroke (TIA)   Week 2 attempt successful?  No   Unsuccessful attempts  Attempt 2          Dariana Jones RN

## 2021-06-29 NOTE — DISCHARGE SUMMARY
"[facility]   DISCHARGE SUMMARY       Date of Admission: 6/12/2021  Date of Discharge:  6/14/2021  Primary Care Physician: Praveen Leung MD    Presenting Problem/History of Present Illness:  Acute right-sided weakness [R53.1]     Final Discharge Diagnoses:  Active Hospital Problems    Diagnosis    • Acute right-sided weakness        Consults: neurology    Procedures Performed: ct angio of the head    Pertinent Test Results: neg ct head    Chief Complaint on Day of Discharge: none    History of Present Illness on Day of Discharge: none     Hospital Course:  The patient is a 76 y.o. female who presented to [facility] with right sided weakness . Neurology was consulted and ct head with angiography was neg in the ed. SHe was admitted to my services and her symptoms resolved. She was seen by neurology service and ct head repeated which was negative She was discharged with adjustment of her medications..      Condition on Discharge:  stable    Physical Exam on Discharge:  /62 (BP Location: Right arm, Patient Position: Lying)   Pulse 78   Temp 98.2 °F (36.8 °C) (Oral)   Resp 16   Ht 160 cm (63\")   Wt 47.2 kg (104 lb)   SpO2 96%   BMI 18.42 kg/m²   Physical Exam  Neurological:      General: No focal deficit present.      Mental Status: She is oriented to person, place, and time. Mental status is at baseline.         Discharge Disposition:  Home or Self Care    Discharge Medications:     Discharge Medications      New Medications      Instructions Start Date   aspirin 81 MG chewable tablet  Notes to patient: Next dose due in am 6-15   81 mg, Oral, Daily      atorvastatin 40 MG tablet  Commonly known as: LIPITOR  Notes to patient: Next dose due tonight 5-14   40 mg, Oral, Nightly         Continue These Medications      Instructions Start Date   Calcium-Vitamin D 500-125 MG-UNIT tablet  Notes to patient: Next dose due in am 6-15   1 tablet, Oral, Daily      multivitamin with " minerals tablet tablet  Notes to patient: Next dose due in am 6-15   1 tablet, Oral, Daily         Stop These Medications    estradiol 0.05 MG/24HR patch  Commonly known as: Climara     rosuvastatin 10 MG tablet  Commonly known as: CRESTOR            Discharge Diet:   Low fat  Activity at Discharge:   As tolerated  Discharge Care Plan/Instructions: take meds as directed    Follow-up Appointments:   Future Appointments   Date Time Provider Department Center   8/4/2021  3:15 PM Swati Amaya APRN [facility]   10/25/2021  9:15 AM Praveen Leung MD [facility]       Test Results Pending at Discharge: none    Praveen Leung MD  06/29/21  06:42 CDT    Time: 6:45am

## 2021-07-05 ENCOUNTER — READMISSION MANAGEMENT (OUTPATIENT)
Dept: CALL CENTER | Facility: HOSPITAL | Age: 76
End: 2021-07-05

## 2021-07-05 NOTE — OUTREACH NOTE
Stroke Week 3 Survey      Responses   [facility] patient discharged from?  [place]   Does the patient have one of the following disease processes/diagnoses(primary or secondary)?  Stroke (TIA)   Week 3 attempt successful?  No   Unsuccessful attempts  Attempt 1          Dariana Jones RN

## 2021-07-06 ENCOUNTER — READMISSION MANAGEMENT (OUTPATIENT)
Dept: CALL CENTER | Facility: HOSPITAL | Age: 76
End: 2021-07-06

## 2021-07-06 NOTE — OUTREACH NOTE
Stroke Week 3 Survey      Responses   [facility] facility patient discharged from?  [place]   Does the patient have one of the following disease processes/diagnoses(primary or secondary)?  Stroke (TIA)   Week 3 attempt successful?  No   Unsuccessful attempts  Attempt 2          Brittany Green RN

## 2021-08-03 ENCOUNTER — OFFICE VISIT (OUTPATIENT)
Dept: NEUROLOGY | Facility: CLINIC | Age: 76
End: 2021-08-03

## 2021-08-03 VITALS
HEART RATE: 100 BPM | WEIGHT: 104.2 LBS | BODY MASS INDEX: 18.46 KG/M2 | DIASTOLIC BLOOD PRESSURE: 80 MMHG | HEIGHT: 63 IN | OXYGEN SATURATION: 98 % | SYSTOLIC BLOOD PRESSURE: 138 MMHG

## 2021-08-03 DIAGNOSIS — G25.0 ESSENTIAL TREMOR: ICD-10-CM

## 2021-08-03 DIAGNOSIS — E78.2 MIXED HYPERLIPIDEMIA: ICD-10-CM

## 2021-08-03 DIAGNOSIS — G45.9 TIA (TRANSIENT ISCHEMIC ATTACK): Primary | ICD-10-CM

## 2021-08-03 DIAGNOSIS — Z96.89 S/P DEEP BRAIN STIMULATOR PLACEMENT: ICD-10-CM

## 2021-08-03 PROCEDURE — 99214 OFFICE O/P EST MOD 30 MIN: CPT | Performed by: CLINICAL NURSE SPECIALIST

## 2021-08-03 NOTE — PATIENT INSTRUCTIONS
Stroke Prevention  Some medical conditions and behaviors are associated with a higher chance of having a stroke. You can help prevent a stroke by making nutrition, lifestyle, and other changes, including managing any medical conditions you may have.  What nutrition changes can be made?    · Eat healthy foods. You can do this by:  ? Choosing foods high in fiber, such as fresh fruits and vegetables and whole grains.  ? Eating at least 5 or more servings of fruits and vegetables a day. Try to fill half of your plate at each meal with fruits and vegetables.  ? Choosing lean protein foods, such as lean cuts of meat, poultry without skin, fish, tofu, beans, and nuts.  ? Eating low-fat dairy products.  ? Avoiding foods that are high in salt (sodium). This can help lower blood pressure.  ? Avoiding foods that have saturated fat, trans fat, and cholesterol. This can help prevent high cholesterol.  ? Avoiding processed and premade foods.  · Follow your health care provider's specific guidelines for losing weight, controlling high blood pressure (hypertension), lowering high cholesterol, and managing diabetes. These may include:  ? Reducing your daily calorie intake.  ? Limiting your daily sodium intake to 1,500 milligrams (mg).  ? Using only healthy fats for cooking, such as olive oil, canola oil, or sunflower oil.  ? Counting your daily carbohydrate intake.  What lifestyle changes can be made?  · Maintain a healthy weight. Talk to your health care provider about your ideal weight.  · Get at least 30 minutes of moderate physical activity at least 5 days a week. Moderate activity includes brisk walking, biking, and swimming.  · Do not use any products that contain nicotine or tobacco, such as cigarettes and e-cigarettes. If you need help quitting, ask your health care provider. It may also be helpful to avoid exposure to secondhand smoke.  · Limit alcohol intake to no more than 1 drink a day for nonpregnant women and 2 drinks  a day for men. One drink equals 12 oz of beer, 5 oz of wine, or 1½ oz of hard liquor.  · Stop any illegal drug use.  · Avoid taking birth control pills. Talk to your health care provider about the risks of taking birth control pills if:  ? You are over 35 years old.  ? You smoke.  ? You get migraines.  ? You have ever had a blood clot.  What other changes can be made?  · Manage your cholesterol levels.  ? Eating a healthy diet is important for preventing high cholesterol. If cholesterol cannot be managed through diet alone, you may also need to take medicines.  ? Take any prescribed medicines to control your cholesterol as told by your health care provider.  · Manage your diabetes.  ? Eating a healthy diet and exercising regularly are important parts of managing your blood sugar. If your blood sugar cannot be managed through diet and exercise, you may need to take medicines.  ? Take any prescribed medicines to control your diabetes as told by your health care provider.  · Control your hypertension.  ? To reduce your risk of stroke, try to keep your blood pressure below 130/80.  ? Eating a healthy diet and exercising regularly are an important part of controlling your blood pressure. If your blood pressure cannot be managed through diet and exercise, you may need to take medicines.  ? Take any prescribed medicines to control hypertension as told by your health care provider.  ? Ask your health care provider if you should monitor your blood pressure at home.  ? Have your blood pressure checked every year, even if your blood pressure is normal. Blood pressure increases with age and some medical conditions.  · Get evaluated for sleep disorders (sleep apnea). Talk to your health care provider about getting a sleep evaluation if you snore a lot or have excessive sleepiness.  · Take over-the-counter and prescription medicines only as told by your health care provider. Aspirin or blood thinners (antiplatelets or  anticoagulants) may be recommended to reduce your risk of forming blood clots that can lead to stroke.  · Make sure that any other medical conditions you have, such as atrial fibrillation or atherosclerosis, are managed.  What are the warning signs of a stroke?  The warning signs of a stroke can be easily remembered as BEFAST.  · B is for balance. Signs include:  ? Dizziness.  ? Loss of balance or coordination.  ? Sudden trouble walking.  · E is for eyes. Signs include:  ? A sudden change in vision.  ? Trouble seeing.  · F is for face. Signs include:  ? Sudden weakness or numbness of the face.  ? The face or eyelid drooping to one side.  · A is for arms. Signs include:  ? Sudden weakness or numbness of the arm, usually on one side of the body.  · S is for speech. Signs include:  ? Trouble speaking (aphasia).  ? Trouble understanding.  · T is for time.  ? These symptoms may represent a serious problem that is an emergency. Do not wait to see if the symptoms will go away. Get medical help right away. Call your local emergency services (911 in the [place] Do not drive yourself to the hospital.  · Other signs of stroke may include:  ? A sudden, severe headache with no known cause.  ? Nausea or vomiting.  ? Seizure.  Where to find more information  For more information, visit:  · American Stroke Association: www.strokeassociation.org  · National Stroke Association: www.stroke.org  Summary  · You can prevent a stroke by eating healthy, exercising, not smoking, limiting alcohol intake, and managing any medical conditions you may have.  · Do not use any products that contain nicotine or tobacco, such as cigarettes and e-cigarettes. If you need help quitting, ask your health care provider. It may also be helpful to avoid exposure to secondhand smoke.  · Remember BEFAST for warning signs of stroke. Get help right away if you or a loved one has any of these signs.  This information is not intended to replace advice given to you  by your health care provider. Make sure you discuss any questions you have with your health care provider.  Document Revised: 11/30/2018 Document Reviewed: 01/23/2018  [employer]vier Patient Education © 2021 [employer]

## 2021-08-03 NOTE — PROGRESS NOTES
Subjective     Chief Complaint   Patient presents with   • Transient Ischemic Attack     pt states she is back to her baseline since discharge       Lavniia Aguilar is a 76 y.o. female right handed retiree. Patient is here for hospital follow up for TIA. She is accompanied by her .  Patient is ambulating unassisted. Patient continues ASA 81 mg daily and Lipitor 40 mg daily. She denies bleeding or myalgias.She denies new symptoms of unilateral weakness/numbness, facial weakness, impaired speech.   PMH includes essential tremor with DBS managed by [facility] and hyperlipidemia. Patient does tell me she is finishing Crestor she has and then plans to switch over to Lipitor.   CTA head and neck showed no significant occlusive disease. CT head negative on admission. CT head repeated as patient has DBS. Repeat CT head negative for acute process. Transthoracic echo showed preserved EF. TTE could not exclude PFO but chance PFO causing her stroke and risk associated with MELANY this was not pursued.    Patient does complain of having hot flashes after discontinuing hormone replacement patch. She is concerned with weight loss and in review of medical records she has lost about 15 pounds in the last 2 years. She denies changes in bowels.  states she is a picky eater at times. She is encouarged to speak with PCP. Patient denies falls and states her last fall was years ago.       6/12/2021 at 12 PM she was last seen normal.  Patient presented on 6/13/2021 and approximately 130 or 2 the patient had a sudden onset of slurred speech, word finding difficulties, drooling from the right side of her mouth, and a discrete numbness in the second digit on her right hand.  She presented to our [facility] ED but was subjectively improving rapidly.  Because she was improving rapidly and the increased bleed right as her last seen normal time was above 3 hours and also had a structural abnormality of the DBS in her brain we elected not to  proceed with TPA.  The patient was admitted overnight and now has returned back to her baseline state.    Transient Ischemic Attack  This is a chronic problem. Episode onset: 6/13/2021. The problem has been resolved. Pertinent negatives include no arthralgias, chest pain, fatigue, sore throat or vomiting. Associated symptoms comments: Word finding and dysarthria, drooling right side of mouth, numbness 2nd digit right hand. . Exacerbated by: HLD. Treatments tried: ASA, statin.        Current Outpatient Medications   Medication Sig Dispense Refill   • aspirin 81 MG chewable tablet Chew 1 tablet Daily. 30 tablet 5   • atorvastatin (LIPITOR) 40 MG tablet Take 1 tablet by mouth Every Night. 30 tablet 5   • Calcium-Vitamin D 500-125 MG-UNIT tablet Take 1 tablet by mouth Daily.     • Multiple Vitamins-Minerals (MULTIVITAMIN ADULT PO) Take 1 tablet by mouth Daily.       No current facility-administered medications for this visit.       Past Medical History:   Diagnosis Date   • Bone/cartilage disorder    • Hyperlipidemia    • Left rotator cuff tear    • PONV (postoperative nausea and vomiting)        Past Surgical History:   Procedure Laterality Date   • CARPAL TUNNEL RELEASE     • CERVICAL SPINE SURGERY     • COLONOSCOPY     • COLONOSCOPY  06/19/2013    internal hemorrhoid   • COLONOSCOPY N/A 7/11/2018    Procedure: COLONOSCOPY WITH ANESTHESIA;  Surgeon: Andrew Palm MD;  Location: [facility] ENDOSCOPY;  Service: Gastroenterology   • HAMMER TOE REPAIR     • HYSTERECTOMY     • INSERTION / REMOVAL CRANIAL DBS GENERATOR     • KNEE CARTILAGE SURGERY     • NASAL SEPTUM SURGERY     • RECTAL SURGERY      rectocele   • SHOULDER ROTATOR CUFF REPAIR Left 02/2018   • SHOULDER SURGERY Right    • TONSILLECTOMY     • TUBAL ABDOMINAL LIGATION         family history includes Colon polyps in her mother.    Social History     Tobacco Use   • Smoking status: Never Smoker   • Smokeless tobacco: Never Used   Substance Use Topics   • Alcohol  "use: No   • Drug use: No       Review of Systems   Constitutional: Negative.  Negative for fatigue.        Hot flashes   HENT: Negative for nosebleeds, postnasal drip, sinus pressure and sore throat.    Eyes: Negative.    Respiratory: Negative.  Negative for apnea.    Cardiovascular: Negative.  Negative for chest pain and leg swelling.   Gastrointestinal: Negative.  Negative for blood in stool, constipation, diarrhea and vomiting.   Endocrine: Negative.    Genitourinary: Negative.  Negative for dyspareunia.   Musculoskeletal: Negative for arthralgias.   Skin: Negative.    Allergic/Immunologic: Negative.    Neurological: Positive for tremors. Negative for dizziness and light-headedness.   Hematological: Negative.    Psychiatric/Behavioral: Negative.  Negative for agitation. The patient is not nervous/anxious.    All other systems reviewed and are negative.      Objective     /80 (BP Location: Left arm, Patient Position: Sitting, Cuff Size: Adult)   Pulse 100   Ht 160 cm (63\")   Wt 47.3 kg (104 lb 3.2 oz)   SpO2 98%   BMI 18.46 kg/m² , Body mass index is 18.46 kg/m².    Physical Exam  Vitals and nursing note reviewed.   Constitutional:       Appearance: Normal appearance. She is well-groomed.      Comments: Well groomed. Make up well applied   HENT:      Head: Normocephalic and atraumatic.      Right Ear: External ear normal.      Left Ear: External ear normal.   Eyes:      General: Lids are normal. No visual field deficit.     Extraocular Movements:      Right eye: Normal extraocular motion and no nystagmus.      Left eye: Normal extraocular motion and no nystagmus.      Pupils: Pupils are equal, round, and reactive to light.   Neck:      Vascular: No carotid bruit.   Cardiovascular:      Rate and Rhythm: Normal rate and regular rhythm.      Heart sounds: Normal heart sounds. No murmur heard.     Pulmonary:      Effort: Pulmonary effort is normal.      Breath sounds: Normal breath sounds. No decreased " breath sounds or rhonchi.   Abdominal:      General: Bowel sounds are normal.      Palpations: Abdomen is soft.   Musculoskeletal:         General: Normal range of motion.      Cervical back: Neck supple.   Skin:     General: Skin is warm and dry.   Neurological:      Mental Status: She is alert and oriented to person, place, and time.      GCS: GCS eye subscore is 4. GCS verbal subscore is 5. GCS motor subscore is 6.      Cranial Nerves: No cranial nerve deficit or dysarthria.      Sensory: No sensory deficit.      Motor: Tremor (essential tremor) present.      Coordination: Coordination normal. Finger-Nose-Finger Test and Heel to Shin Test normal.      Gait: Gait normal.      Deep Tendon Reflexes: Reflexes are normal and symmetric.      Reflex Scores:       Tricep reflexes are 2+ on the right side and 2+ on the left side.       Bicep reflexes are 2+ on the right side and 2+ on the left side.       Brachioradialis reflexes are 2+ on the right side and 2+ on the left side.       Patellar reflexes are 2+ on the right side and 2+ on the left side.       Achilles reflexes are 2+ on the right side and 2+ on the left side.     Comments: Awake, alert. No aphasia, no dysarthria  Completes simple and complex commands    CN II:  Visual fields full.  Pupils equally reactive to light  CN III, IV, VI:  Extraocular Muscles full with no signs of nystagmus  CN V:  Facial sensory is symmetric with no asymetries.  CN VII:  Facial motor symmetric  CN VIII:  Gross hearing intact bilaterally  CN IX:  Palate elevates symmetrically  CN X:  Palate elevates symmetrically  CN XI:  Shoulder shrug symmetric  CN XII:  Tongue is midline on protrusion    Full and symmetric strength bilateral upper and lower extremities.   Psychiatric:         Speech: Speech normal.         Behavior: Behavior normal. Behavior is cooperative.         Results for orders placed or performed during the hospital encounter of 06/12/21   COVID-19,[facility] IN-HOUSE,Nasal  Swab No Transport Media 3-4 HR TAT - Swab, Nasal Cavity    Specimen: Nasal Cavity; Swab   Result Value Ref Range    COVID19 Not Detected Not Detected - Ref. Range   Comprehensive Metabolic Panel    Specimen: Blood   Result Value Ref Range    Glucose 100 (H) 65 - 99 mg/dL    BUN 15 8 - 23 mg/dL    Creatinine 0.68 0.57 - 1.00 mg/dL    Sodium 139 136 - 145 mmol/L    Potassium 4.2 3.5 - 5.2 mmol/L    Chloride 102 98 - 107 mmol/L    CO2 30.0 (H) 22.0 - 29.0 mmol/L    Calcium 9.4 8.6 - 10.5 mg/dL    Total Protein 7.0 6.0 - 8.5 g/dL    Albumin 4.10 3.50 - 5.20 g/dL    ALT (SGPT) 15 1 - 33 U/L    AST (SGOT) 20 1 - 32 U/L    Alkaline Phosphatase 76 39 - 117 U/L    Total Bilirubin <0.2 0.0 - 1.2 mg/dL    eGFR Non African Amer 84 >60 mL/min/1.73    Globulin 2.9 gm/dL    A/G Ratio 1.4 g/dL    BUN/Creatinine Ratio 22.1 7.0 - 25.0    Anion Gap 7.0 5.0 - 15.0 mmol/L   Protime-INR    Specimen: Blood   Result Value Ref Range    Protime 12.0 11.5 - 13.4 Seconds    INR 0.96 0.91 - 1.09   aPTT    Specimen: Blood   Result Value Ref Range    PTT 32.3 24.1 - 35.0 seconds   Troponin    Specimen: Blood   Result Value Ref Range    Troponin T <0.010 0.000 - 0.030 ng/mL   CBC Auto Differential    Specimen: Blood   Result Value Ref Range    WBC 6.99 3.40 - 10.80 10*3/mm3    RBC 4.24 3.77 - 5.28 10*6/mm3    Hemoglobin 13.5 12.0 - 15.9 g/dL    Hematocrit 40.8 34.0 - 46.6 %    MCV 96.2 79.0 - 97.0 fL    MCH 31.8 26.6 - 33.0 pg    MCHC 33.1 31.5 - 35.7 g/dL    RDW 12.8 12.3 - 15.4 %    RDW-SD 45.4 37.0 - 54.0 fl    MPV 9.9 6.0 - 12.0 fL    Platelets 265 140 - 450 10*3/mm3    Neutrophil % 58.1 42.7 - 76.0 %    Lymphocyte % 31.9 19.6 - 45.3 %    Monocyte % 7.7 5.0 - 12.0 %    Eosinophil % 1.9 0.3 - 6.2 %    Basophil % 0.3 0.0 - 1.5 %    Immature Grans % 0.1 0.0 - 0.5 %    Neutrophils, Absolute 4.06 1.70 - 7.00 10*3/mm3    Lymphocytes, Absolute 2.23 0.70 - 3.10 10*3/mm3    Monocytes, Absolute 0.54 0.10 - 0.90 10*3/mm3    Eosinophils, Absolute 0.13  0.00 - 0.40 10*3/mm3    Basophils, Absolute 0.02 0.00 - 0.20 10*3/mm3    Immature Grans, Absolute 0.01 0.00 - 0.05 10*3/mm3    nRBC 0.0 0.0 - 0.2 /100 WBC   Hemoglobin A1c    Specimen: Blood   Result Value Ref Range    Hemoglobin A1C 5.30 4.80 - 5.60 %   Lipid Panel    Specimen: Blood   Result Value Ref Range    Total Cholesterol 174 0 - 200 mg/dL    Triglycerides 78 0 - 150 mg/dL    HDL Cholesterol 60 40 - 60 mg/dL    LDL Cholesterol  99 0 - 100 mg/dL    VLDL Cholesterol 15 5 - 40 mg/dL    LDL/HDL Ratio 1.64    POC Glucose Once    Specimen: Blood   Result Value Ref Range    Glucose 85 70 - 130 mg/dL   POC Glucose Once    Specimen: Blood   Result Value Ref Range    Glucose 91 70 - 130 mg/dL   POC Glucose Once    Specimen: Blood   Result Value Ref Range    Glucose 89 70 - 130 mg/dL   ECG 12 Lead   Result Value Ref Range    QT Interval 362 ms    QTC Interval 401 ms   ECG 12 Lead   Result Value Ref Range    QT Interval 452 ms    QTC Interval 473 ms   Adult Transthoracic Echo Complete W/ Cont if Necessary Per Protocol (With Agitated Saline)   Result Value Ref Range    BSA 1.5 m^2    RVIDd 2.4 cm    IVSd 0.96 cm    LVIDd 4.2 cm    LVIDs 2.8 cm    LVPWd 0.82 cm    IVS/LVPW 1.2     FS 33.6 %    EDV(Teich) 77.3 ml    ESV(Teich) 28.8 ml    EF(Teich) 62.8 %    EDV(cubed) 72.5 ml    ESV(cubed) 21.3 ml    EF(cubed) 70.7 %    LV mass(C)d 115.1 grams    LV mass(C)dI 78.6 grams/m^2    SV(Teich) 48.5 ml    SI(Teich) 33.1 ml/m^2    SV(cubed) 51.3 ml    SI(cubed) 35.0 ml/m^2    Ao root diam 2.6 cm    Ao root area 5.3 cm^2    LA dimension 2.9 cm    LA/Ao 1.1     LVOT diam 1.8 cm    LVOT area 2.5 cm^2    LVOT area(traced) 2.5 cm^2    LVLd ap4 7.1 cm    EDV(MOD-sp4) 53.9 ml    LVLs ap4 5.3 cm    ESV(MOD-sp4) 14.1 ml    EF(MOD-sp4) 73.8 %    SV(MOD-sp4) 39.8 ml    SI(MOD-sp4) 27.2 ml/m^2    Ao root area (BSA corrected) 1.8     LV Krueger Vol (BSA corrected) 36.8 ml/m^2    LV Sys Vol (BSA corrected) 9.6 ml/m^2    MV E max ashwin 62.1 cm/sec     MV A max skyler 76.6 cm/sec    MV E/A 0.81     MV dec time 0.25 sec    Ao pk skyler 122.0 cm/sec    Ao max PG 6.0 mmHg    Ao max PG (full) 2.2 mmHg    Ao V2 mean 74.0 cm/sec    Ao mean PG 3.0 mmHg    Ao mean PG (full) 1.0 mmHg    Ao V2 VTI 21.3 cm    KOFI(I,A) 2.2 cm^2    KOFI(I,D) 2.2 cm^2    KOFI(V,A) 2.0 cm^2    KOFI(V,D) 2.0 cm^2    LV V1 max PG 3.7 mmHg    LV V1 mean PG 2.0 mmHg    LV V1 max 96.8 cm/sec    LV V1 mean 66.7 cm/sec    LV V1 VTI 18.7 cm    SV(Ao) 113.1 ml    SI(Ao) 77.2 ml/m^2    SV(LVOT) 47.6 ml    SI(LVOT) 32.5 ml/m^2    PA V2 max 89.2 cm/sec    PA max PG 3.2 mmHg    TR max skyler 225.0 cm/sec    RVSP(TR) 25.3 mmHg    RAP systole 5.0 mmHg     CV ECHO JADIEL - BZI_BMI 18.4 kilograms/m^2     CV ECHO JADIEL - BSA(HAYCOCK) 1.4 m^2     CV ECHO JADIEL - BZI_METRIC_WEIGHT 47.2 kg     CV ECHO JADIEL - BZI_METRIC_HEIGHT 160.0 cm    Target HR (85%) 122 bpm    Max. Pred. HR (100%) 144 bpm    LA volume 25.4 cm3    LA Volume Index 17.4 mL/m2    Avg E/e' ratio 6.92     Lat Peak E' Skyler 9.9 cm/sec    Med Peak E' Skyler 8.05 cm/sec   Type & Screen    Specimen: Blood   Result Value Ref Range    ABO Type O     RH type Negative     Antibody Screen Negative     T&S Expiration Date 6/15/2021 11:59:59 PM    Green Top (Gel)   Result Value Ref Range    Extra Tube Hold for add-ons.    Lavender Top   Result Value Ref Range    Extra Tube hold for add-on    Red Top   Result Value Ref Range    Extra Tube Hold for add-ons.         ASSESSMENT/PLAN    Diagnoses and all orders for this visit:    TIA (transient ischemic attack)    Mixed hyperlipidemia    Essential tremor    S/P deep brain stimulator placement          ICD-10-CM ICD-9-CM   1. TIA (transient ischemic attack)  G45.9 435.9   2. Mixed hyperlipidemia  E78.2 272.2   3. Essential tremor  G25.0 333.1   4. S/P deep brain stimulator placement  Z96.89 V45.89      PLAN  1. Continue ASA 81 mg daily for secondary stroke prevention.  2. Continue statin for LDL goal less than 70. Patient has  Crestor at home and is finishing taking the remaining tablets and then plans on transition to Lipitor.  3. Patient plans to continue to follow [facility] for DBS but I did offer that should she wish to stay local with care and if the travel to [place] becomes difficult, FER Mcqueen PA-C does manage DBS for our office.  4. Patient concerned with cholesterol levels and  states she has been more picky about her food. I do recommend a balance with her diet to maintain her weight and to discuss with PCP.  5. HRT was discontinued at time of TIA. Patient is reporting hot flashes and I recommend she f/u with PCP or GYN for nonestrogen solutions.   6. F/u in 4 months with [facility].  7. Patient is counseled on stroke signs and symptoms using FAST and Time Saved is Brain Saved.       allergies and all known medications/prescriptions have been reviewed using resources available on this encounter.    Return in about 4 months (around 12/3/2021).        ISACC Luong

## 2021-08-14 ENCOUNTER — HOSPITAL ENCOUNTER (EMERGENCY)
Facility: HOSPITAL | Age: 76
Discharge: LEFT BEFORE DISCHARGE | End: 2021-08-14

## 2021-08-14 PROCEDURE — 99211 OFF/OP EST MAY X REQ PHY/QHP: CPT

## 2021-08-16 ENCOUNTER — TELEPHONE (OUTPATIENT)
Dept: FAMILY MEDICINE CLINIC | Facility: CLINIC | Age: 76
End: 2021-08-16

## 2021-08-16 NOTE — TELEPHONE ENCOUNTER
Caller: Lavinia Aguilar    Relationship to patient: Self    Best call back number: [phone]    Patient would like to let the office know that she saw an orthopedic surgeon today. She wanted to apologize that she did not make it. She states she will not have to have surgery.She will have to do some exercises to help with the injury.

## 2021-10-04 ENCOUNTER — TELEPHONE (OUTPATIENT)
Dept: FAMILY MEDICINE CLINIC | Facility: CLINIC | Age: 76
End: 2021-10-04

## 2021-10-04 RX ORDER — ATORVASTATIN CALCIUM 40 MG/1
40 TABLET, FILM COATED ORAL NIGHTLY
Qty: 30 TABLET | Refills: 5 | Status: SHIPPED | OUTPATIENT
Start: 2021-10-04 | End: 2021-10-05 | Stop reason: SDUPTHER

## 2021-10-04 NOTE — TELEPHONE ENCOUNTER
Pt is needing a refill on auro-rosuvastatin. Last refill left her short and she took the last pill today.

## 2021-10-05 RX ORDER — ATORVASTATIN CALCIUM 40 MG/1
40 TABLET, FILM COATED ORAL NIGHTLY
Qty: 30 TABLET | Refills: 5 | Status: SHIPPED | OUTPATIENT
Start: 2021-10-05 | End: 2021-10-25

## 2021-10-05 NOTE — TELEPHONE ENCOUNTER
Caller: Lavinia Aguilar    Relationship: Self      Medication requested (name and dosage):     atorvastatin (LIPITOR) 40 MG tablet    Pharmacy where request should be sent:     [facility] / [place] [id number] [address] - [phone]  - [phone] FX  [phone]      Additional details provided by patient:    PATIENT STATES SHE ONLY NEEDS ENOUGH UNTIL HER APPOINTMENT ON THE 25TH  PATIENT IS OUT OF MEDICATION    Best call back number: [phone]    Does the patient have less than a 3 day supply:  [x] Yes  [] No    Diana Cleary, Porfirio Rep   10/05/21 14:51 CDT

## 2021-10-25 ENCOUNTER — OFFICE VISIT (OUTPATIENT)
Dept: FAMILY MEDICINE CLINIC | Facility: CLINIC | Age: 76
End: 2021-10-25

## 2021-10-25 VITALS
RESPIRATION RATE: 16 BRPM | HEIGHT: 63 IN | OXYGEN SATURATION: 97 % | BODY MASS INDEX: 18.07 KG/M2 | HEART RATE: 112 BPM | WEIGHT: 102 LBS

## 2021-10-25 DIAGNOSIS — M85.80 OSTEOPENIA, UNSPECIFIED LOCATION: ICD-10-CM

## 2021-10-25 DIAGNOSIS — R93.6 ABNORMAL FINDINGS ON DIAGNOSTIC IMAGING OF LIMBS: ICD-10-CM

## 2021-10-25 DIAGNOSIS — E78.2 MIXED HYPERLIPIDEMIA: Primary | ICD-10-CM

## 2021-10-25 DIAGNOSIS — G45.9 TIA (TRANSIENT ISCHEMIC ATTACK): ICD-10-CM

## 2021-10-25 PROCEDURE — 1160F RVW MEDS BY RX/DR IN RCRD: CPT | Performed by: FAMILY MEDICINE

## 2021-10-25 PROCEDURE — G0439 PPPS, SUBSEQ VISIT: HCPCS | Performed by: FAMILY MEDICINE

## 2021-10-25 PROCEDURE — 99213 OFFICE O/P EST LOW 20 MIN: CPT | Performed by: FAMILY MEDICINE

## 2021-10-25 PROCEDURE — 1170F FXNL STATUS ASSESSED: CPT | Performed by: FAMILY MEDICINE

## 2021-10-25 RX ORDER — ROSUVASTATIN CALCIUM 10 MG/1
10 TABLET, COATED ORAL DAILY
Qty: 90 TABLET | Refills: 1 | Status: SHIPPED | OUTPATIENT
Start: 2021-10-25 | End: 2021-10-26 | Stop reason: SDUPTHER

## 2021-10-25 RX ORDER — ROSUVASTATIN CALCIUM 10 MG/1
1 TABLET, COATED ORAL DAILY
COMMUNITY
End: 2021-10-25 | Stop reason: SDUPTHER

## 2021-10-25 NOTE — PROGRESS NOTES
The ABCs of the Annual Wellness Visit  Subsequent Medicare Wellness Visit    Chief Complaint   Patient presents with   • Transient Ischemic Attack   • Medicare Wellness-subsequent     pt states that she needs [facility] at [facility]      Subjective    History of Present Illness:  Lavinia Aguilar is a 76 y.o. female who presents for a Subsequent Medicare Wellness Visit.    The following portions of the patient's history were reviewed and   updated as appropriate: allergies, current medications, past family history, past medical history, past social history, past surgical history and problem list.    Compared to one year ago, the patient feels her physical   health is the same.    Compared to one year ago, the patient feels her mental   health is the same.    Recent Hospitalizations:  This patient has had a [facility] [place] admission record on file within the last 365 days.    Current Medical Providers:  Patient Care Team:  Praveen Leung MD as PCP - General (Family Medicine)  Andrew Palm MD as Consulting Physician (Gastroenterology)  Vivien Solis MD as Consulting Physician (Obstetrics and Gynecology)    Outpatient Medications Prior to Visit   Medication Sig Dispense Refill   • aspirin 81 MG chewable tablet Chew 1 tablet Daily. 30 tablet 5   • Calcium-Vitamin D 500-125 MG-UNIT tablet Take 1 tablet by mouth Daily.     • Multiple Vitamins-Minerals (MULTIVITAMIN ADULT PO) Take 1 tablet by mouth Daily.     • rosuvastatin (CRESTOR) 10 MG tablet Take 1 tablet by mouth Daily.     • atorvastatin (LIPITOR) 40 MG tablet Take 1 tablet by mouth Every Night. 30 tablet 5     No facility-administered medications prior to visit.       No opioid medication identified on active medication list. I have reviewed chart for other potential  high risk medication/s and harmful drug interactions in the elderly.          Aspirin is on active medication list. Aspirin use is indicated based on review of current medical  "condition/s. Pros and cons of this therapy have been discussed today. Benefits of this medication outweigh potential harm.  Patient has been encouraged to continue taking this medication.  .      Patient Active Problem List   Diagnosis   • Mixed hyperlipidemia   • Hormone replacement therapy   • Cyst of lip   • Cold   • Palpitations   • Sprain of left rotator cuff capsule   • Family hx colonic polyps   • Herpes zoster without complication   • Skin lesion   • Acute sinusitis   • Weight loss   • Acute right-sided weakness   • TIA (transient ischemic attack)   • Essential tremor   • S/P deep brain stimulator placement     Advance Care Planning  Advance Directive is not on file.  ACP discussion was held with the patient during this visit. Patient has an advance directive (not in EMR), copy requested.          Objective    Vitals:    10/25/21 0910   Pulse: 112   Resp: 16   SpO2: 97%   Weight: 46.3 kg (102 lb)   Height: 160 cm (63\")     BMI Readings from Last 1 Encounters:   10/25/21 18.07 kg/m²   BMI is below normal parameters. Recommendations include: treating the underlying disease process    Does the patient have evidence of cognitive impairment? No    Physical Exam            HEALTH RISK ASSESSMENT    Smoking Status:  Social History     Tobacco Use   Smoking Status Never Smoker   Smokeless Tobacco Never Used     Alcohol Consumption:  Social History     Substance and Sexual Activity   Alcohol Use No     Fall Risk Screen:    STEADI Fall Risk Assessment was completed, and patient is at HIGH risk for falls. Assessment completed on:8/3/2021    Depression Screening:  PHQ-2/PHQ-9 Depression Screening 10/25/2021   Little interest or pleasure in doing things 0   Feeling down, depressed, or hopeless 1   Trouble falling or staying asleep, or sleeping too much 0   Feeling tired or having little energy 0   Poor appetite or overeating 0   Feeling bad about yourself - or that you are a failure or have let yourself or your family " down 0   Trouble concentrating on things, such as reading the newspaper or watching television 0   Moving or speaking so slowly that other people could have noticed. Or the opposite - being so fidgety or restless that you have been moving around a lot more than usual 0   Thoughts that you would be better off dead, or of hurting yourself in some way 0   Total Score 1   If you checked off any problems, how difficult have these problems made it for you to do your work, take care of things at home, or get along with other people? Not difficult at all       Health Habits and Functional and Cognitive Screening:  Functional & Cognitive Status 10/25/2021   Do you have difficulty preparing food and eating? No   Do you have difficulty bathing yourself, getting dressed or grooming yourself? No   Do you have difficulty using the toilet? No   Do you have difficulty moving around from place to place? No   Do you have trouble with steps or getting out of a bed or a chair? No   Current Diet Well Balanced Diet   Dental Exam Up to date   Eye Exam Not up to date   Exercise (times per week) 3 times per week   Current Exercises Include Walking   Current Exercise Activities Include -   Do you need help using the phone?  No   Are you deaf or do you have serious difficulty hearing?  No   Do you need help with transportation? No   Do you need help shopping? No   Do you need help preparing meals?  No   Do you need help with housework?  No   Do you need help with laundry? No   Do you need help taking your medications? No   Do you need help managing money? No   Do you ever drive or ride in a car without wearing a seat belt? No   Have you felt unusual stress, anger or loneliness in the last month? No   Who do you live with? Spouse   If you need help, do you have trouble finding someone available to you? No   Have you been bothered in the last four weeks by sexual problems? No   Do you have difficulty concentrating, remembering or making  decisions? No       Age-appropriate Screening Schedule:  Refer to the list below for future screening recommendations based on patient's age, sex and/or medical conditions. Orders for these recommended tests are listed in the plan section. The patient has been provided with a written plan.    Health Maintenance   Topic Date Due   • TDAP/TD VACCINES (1 - Tdap) Never done   • ZOSTER VACCINE (1 of 2) Never done   • DXA SCAN  12/12/2020   • INFLUENZA VACCINE  08/01/2021   • LIPID PANEL  06/13/2022   • MAMMOGRAM  04/09/2023              Assessment/Plan   CMS Preventative Services Quick Reference  Risk Factors Identified During Encounter  Cardiovascular Disease  The above risks/problems have been discussed with the patient.  Follow up actions/plans if indicated are seen below in the Assessment/Plan Section.  Pertinent information has been shared with the patient in the After Visit Summary.    Diagnoses and all orders for this visit:    1. Mixed hyperlipidemia (Primary)  -     CBC & Differential  -     Comprehensive metabolic panel  -     Lipid Panel With / Chol / HDL Ratio    2. TIA (transient ischemic attack)    3. Osteopenia, unspecified location  -     DEXA Bone Density Axial; Future    4. Abnormal findings on diagnostic imaging of limbs   -     DEXA Bone Density Axial; Future        Follow Up:   No follow-ups on file.     An After Visit Summary and PPPS were made available to the patient.        I spent 6  minutes caring for Lavinia on this date of service. This time includes time spent by me in the following activities:reviewing tests, ordering medications, tests, or procedures, documenting information in the medical record and independently interpreting results and communicating that information with the patient/family/caregiver

## 2021-10-25 NOTE — PROGRESS NOTES
Subjective   Lavinia Aguilar is a 76 y.o. female.     Chief Complaint   Patient presents with   • Transient Ischemic Attack   • Medicare Wellness-subsequent     pt states that she needs a bone density [facility]       History of Present Illness     she denies any further tia symp;toms and has no issues with myalgias wtih her her statin therapy--she has osteopenia and needs dexa scan      Current Outpatient Medications:   •  aspirin 81 MG chewable tablet, Chew 1 tablet Daily., Disp: 30 tablet, Rfl: 5  •  Calcium-Vitamin D 500-125 MG-UNIT tablet, Take 1 tablet by mouth Daily., Disp: , Rfl:   •  Multiple Vitamins-Minerals (MULTIVITAMIN ADULT PO), Take 1 tablet by mouth Daily., Disp: , Rfl:   •  rosuvastatin (CRESTOR) 10 MG tablet, Take 1 tablet by mouth Daily., Disp: , Rfl:   No Known Allergies    Past Medical History:   Diagnosis Date   • Bone/cartilage disorder    • Hyperlipidemia    • Left rotator cuff tear    • PONV (postoperative nausea and vomiting)      Past Surgical History:   Procedure Laterality Date   • CARPAL TUNNEL RELEASE     • CERVICAL SPINE SURGERY     • COLONOSCOPY     • COLONOSCOPY  06/19/2013    internal hemorrhoid   • COLONOSCOPY N/A 7/11/2018    Procedure: COLONOSCOPY WITH ANESTHESIA;  Surgeon: Andrew Palm MD;  Location: [facility];  Service: Gastroenterology   • HAMMER TOE REPAIR     • HYSTERECTOMY     • INSERTION / REMOVAL CRANIAL DBS GENERATOR     • KNEE CARTILAGE SURGERY     • NASAL SEPTUM SURGERY     • RECTAL SURGERY      rectocele   • SHOULDER ROTATOR CUFF REPAIR Left 02/2018   • SHOULDER SURGERY Right    • TONSILLECTOMY     • TUBAL ABDOMINAL LIGATION         Review of Systems   Constitutional: Negative.    HENT: Negative.    Eyes: Negative.    Respiratory: Negative.    Cardiovascular: Negative.    Gastrointestinal: Negative.    Endocrine: Negative.    Genitourinary: Negative.    Musculoskeletal: Negative.    Skin: Negative.    Allergic/Immunologic: Negative.    Neurological:  "Negative.    Hematological: Negative.    Psychiatric/Behavioral: Negative.        Objective  Pulse 112   Resp 16   Ht 160 cm (63\")   Wt 46.3 kg (102 lb)   SpO2 97%   BMI 18.07 kg/m²   Physical Exam  Vitals reviewed.   Constitutional:       Appearance: Normal appearance. She is normal weight.   HENT:      Head: Normocephalic and atraumatic.      Mouth/Throat:      Mouth: Mucous membranes are moist.      Pharynx: Oropharynx is clear.   Eyes:      Extraocular Movements: Extraocular movements intact.      Conjunctiva/sclera: Conjunctivae normal.      Pupils: Pupils are equal, round, and reactive to light.   Cardiovascular:      Rate and Rhythm: Normal rate and regular rhythm.      Pulses: Normal pulses.      Heart sounds: Normal heart sounds.   Pulmonary:      Effort: Pulmonary effort is normal.      Breath sounds: Normal breath sounds.   Abdominal:      General: Abdomen is flat. Bowel sounds are normal.      Palpations: Abdomen is soft.   Musculoskeletal:         General: Normal range of motion.      Cervical back: Normal range of motion and neck supple.   Skin:     General: Skin is warm and dry.      Capillary Refill: Capillary refill takes less than 2 seconds.   Neurological:      General: No focal deficit present.      Mental Status: She is alert and oriented to person, place, and time. Mental status is at baseline.   Psychiatric:         Mood and Affect: Mood normal.         Behavior: Behavior normal.         Thought Content: Thought content normal.         Judgment: Judgment normal.         Assessment/Plan   Diagnoses and all orders for this visit:    1. Mixed hyperlipidemia (Primary)  -     CBC & Differential  -     Comprehensive metabolic panel  -     Lipid Panel With / Chol / HDL Ratio    2. TIA (transient ischemic attack)    3. Osteopenia, unspecified location  -     DEXA Bone Density Axial; Future    4. Abnormal findings on diagnostic imaging of limbs   -     DEXA Bone Density Axial; Future             "     Orders Placed This Encounter   Procedures   • DEXA Bone Density Axial     Standing Status:   Future     Standing Expiration Date:   10/25/2022     Order Specific Question:   Is patient taking or have taken long term Glucocorticoid (steroids)?     Answer:   No     Order Specific Question:   Does the patient have rheumatoid arthritis?     Answer:   No     Order Specific Question:   Does the patient have secondary osteoporosis?     Answer:   No     Order Specific Question:   Reason for Exam:     Answer:   screen for osteoporosis     Order Specific Question:   Does this patient have a diabetic monitoring/medication delivering device on?     Answer:   No     Order Specific Question:   Release to patient     Answer:   Immediate   • Comprehensive metabolic panel     Order Specific Question:   Release to patient     Answer:   Immediate   • Lipid Panel With / Chol / HDL Ratio     Order Specific Question:   Release to patient     Answer:   Immediate   • CBC & Differential       Follow up: 6 month(s)

## 2021-10-26 LAB
ALBUMIN SERPL-MCNC: 4.4 G/DL (ref 3.5–5.2)
ALBUMIN/GLOB SERPL: 1.9 G/DL
ALP SERPL-CCNC: 85 U/L (ref 39–117)
ALT SERPL-CCNC: 17 U/L (ref 1–33)
AST SERPL-CCNC: 20 U/L (ref 1–32)
BASOPHILS # BLD AUTO: 0.01 10*3/MM3 (ref 0–0.2)
BASOPHILS NFR BLD AUTO: 0.2 % (ref 0–1.5)
BILIRUB SERPL-MCNC: 0.2 MG/DL (ref 0–1.2)
BUN SERPL-MCNC: 12 MG/DL (ref 8–23)
BUN/CREAT SERPL: 21.1 (ref 7–25)
CALCIUM SERPL-MCNC: 9.4 MG/DL (ref 8.6–10.5)
CHLORIDE SERPL-SCNC: 105 MMOL/L (ref 98–107)
CHOLEST SERPL-MCNC: 253 MG/DL (ref 0–200)
CHOLEST/HDLC SERPL: 3.95 {RATIO}
CO2 SERPL-SCNC: 29.9 MMOL/L (ref 22–29)
CREAT SERPL-MCNC: 0.57 MG/DL (ref 0.57–1)
EOSINOPHIL # BLD AUTO: 0.05 10*3/MM3 (ref 0–0.4)
EOSINOPHIL NFR BLD AUTO: 0.9 % (ref 0.3–6.2)
ERYTHROCYTE [DISTWIDTH] IN BLOOD BY AUTOMATED COUNT: 12.9 % (ref 12.3–15.4)
GLOBULIN SER CALC-MCNC: 2.3 GM/DL
GLUCOSE SERPL-MCNC: 88 MG/DL (ref 65–99)
HCT VFR BLD AUTO: 44.1 % (ref 34–46.6)
HDLC SERPL-MCNC: 64 MG/DL (ref 40–60)
HGB BLD-MCNC: 14.2 G/DL (ref 12–15.9)
IMM GRANULOCYTES # BLD AUTO: 0.02 10*3/MM3 (ref 0–0.05)
IMM GRANULOCYTES NFR BLD AUTO: 0.3 % (ref 0–0.5)
LDLC SERPL CALC-MCNC: 165 MG/DL (ref 0–100)
LYMPHOCYTES # BLD AUTO: 1.47 10*3/MM3 (ref 0.7–3.1)
LYMPHOCYTES NFR BLD AUTO: 25.5 % (ref 19.6–45.3)
MCH RBC QN AUTO: 31.5 PG (ref 26.6–33)
MCHC RBC AUTO-ENTMCNC: 32.2 G/DL (ref 31.5–35.7)
MCV RBC AUTO: 97.8 FL (ref 79–97)
MONOCYTES # BLD AUTO: 0.51 10*3/MM3 (ref 0.1–0.9)
MONOCYTES NFR BLD AUTO: 8.9 % (ref 5–12)
NEUTROPHILS # BLD AUTO: 3.7 10*3/MM3 (ref 1.7–7)
NEUTROPHILS NFR BLD AUTO: 64.2 % (ref 42.7–76)
NRBC BLD AUTO-RTO: 0 /100 WBC (ref 0–0.2)
PLATELET # BLD AUTO: 217 10*3/MM3 (ref 140–450)
POTASSIUM SERPL-SCNC: 4.3 MMOL/L (ref 3.5–5.2)
PROT SERPL-MCNC: 6.7 G/DL (ref 6–8.5)
RBC # BLD AUTO: 4.51 10*6/MM3 (ref 3.77–5.28)
SODIUM SERPL-SCNC: 144 MMOL/L (ref 136–145)
TRIGL SERPL-MCNC: 137 MG/DL (ref 0–150)
VLDLC SERPL CALC-MCNC: 24 MG/DL (ref 5–40)
WBC # BLD AUTO: 5.76 10*3/MM3 (ref 3.4–10.8)

## 2021-10-26 RX ORDER — ROSUVASTATIN CALCIUM 10 MG/1
10 TABLET, COATED ORAL DAILY
Qty: 90 TABLET | Refills: 1 | Status: SHIPPED | OUTPATIENT
Start: 2021-10-26 | End: 2022-01-19 | Stop reason: SDUPTHER

## 2021-12-03 ENCOUNTER — OFFICE VISIT (OUTPATIENT)
Dept: NEUROLOGY | Facility: CLINIC | Age: 76
End: 2021-12-03

## 2021-12-03 ENCOUNTER — LAB (OUTPATIENT)
Dept: LAB | Facility: HOSPITAL | Age: 76
End: 2021-12-03

## 2021-12-03 VITALS
RESPIRATION RATE: 18 BRPM | HEART RATE: 102 BPM | HEIGHT: 63 IN | WEIGHT: 102 LBS | OXYGEN SATURATION: 99 % | SYSTOLIC BLOOD PRESSURE: 136 MMHG | DIASTOLIC BLOOD PRESSURE: 70 MMHG | BODY MASS INDEX: 18.07 KG/M2

## 2021-12-03 DIAGNOSIS — E78.2 MIXED HYPERLIPIDEMIA: ICD-10-CM

## 2021-12-03 DIAGNOSIS — E55.9 VITAMIN D DEFICIENCY: ICD-10-CM

## 2021-12-03 DIAGNOSIS — G25.0 ESSENTIAL TREMOR: ICD-10-CM

## 2021-12-03 DIAGNOSIS — Z96.89 S/P DEEP BRAIN STIMULATOR PLACEMENT: ICD-10-CM

## 2021-12-03 DIAGNOSIS — R41.3 MEMORY CHANGES: ICD-10-CM

## 2021-12-03 DIAGNOSIS — G45.9 TIA (TRANSIENT ISCHEMIC ATTACK): Primary | ICD-10-CM

## 2021-12-03 LAB
ALBUMIN SERPL-MCNC: 4.4 G/DL (ref 3.5–5.2)
ALBUMIN/GLOB SERPL: 1.8 G/DL
ALP SERPL-CCNC: 80 U/L (ref 39–117)
ALT SERPL W P-5'-P-CCNC: 18 U/L (ref 1–33)
ANION GAP SERPL CALCULATED.3IONS-SCNC: 9 MMOL/L (ref 5–15)
AST SERPL-CCNC: 23 U/L (ref 1–32)
BILIRUB SERPL-MCNC: 0.2 MG/DL (ref 0–1.2)
BUN SERPL-MCNC: 16 MG/DL (ref 8–23)
BUN/CREAT SERPL: 21.1 (ref 7–25)
CALCIUM SPEC-SCNC: 9.8 MG/DL (ref 8.6–10.5)
CHLORIDE SERPL-SCNC: 103 MMOL/L (ref 98–107)
CO2 SERPL-SCNC: 30 MMOL/L (ref 22–29)
CREAT SERPL-MCNC: 0.76 MG/DL (ref 0.57–1)
DEPRECATED RDW RBC AUTO: 47.3 FL (ref 37–54)
ERYTHROCYTE [DISTWIDTH] IN BLOOD BY AUTOMATED COUNT: 13.1 % (ref 12.3–15.4)
GFR SERPL CREATININE-BSD FRML MDRD: 74 ML/MIN/1.73
GLOBULIN UR ELPH-MCNC: 2.5 GM/DL
GLUCOSE SERPL-MCNC: 80 MG/DL (ref 65–99)
HCT VFR BLD AUTO: 43 % (ref 34–46.6)
HGB BLD-MCNC: 14.2 G/DL (ref 12–15.9)
MCH RBC QN AUTO: 32.2 PG (ref 26.6–33)
MCHC RBC AUTO-ENTMCNC: 33 G/DL (ref 31.5–35.7)
MCV RBC AUTO: 97.5 FL (ref 79–97)
PLATELET # BLD AUTO: 198 10*3/MM3 (ref 140–450)
PMV BLD AUTO: 10.2 FL (ref 6–12)
POTASSIUM SERPL-SCNC: 3.8 MMOL/L (ref 3.5–5.2)
PROT SERPL-MCNC: 6.9 G/DL (ref 6–8.5)
RBC # BLD AUTO: 4.41 10*6/MM3 (ref 3.77–5.28)
SODIUM SERPL-SCNC: 142 MMOL/L (ref 136–145)
TSH SERPL DL<=0.05 MIU/L-ACNC: 3.9 UIU/ML (ref 0.27–4.2)
WBC NRBC COR # BLD: 5.45 10*3/MM3 (ref 3.4–10.8)

## 2021-12-03 PROCEDURE — 82306 VITAMIN D 25 HYDROXY: CPT

## 2021-12-03 PROCEDURE — 82746 ASSAY OF FOLIC ACID SERUM: CPT

## 2021-12-03 PROCEDURE — 82607 VITAMIN B-12: CPT

## 2021-12-03 PROCEDURE — 36415 COLL VENOUS BLD VENIPUNCTURE: CPT

## 2021-12-03 PROCEDURE — 80053 COMPREHEN METABOLIC PANEL: CPT

## 2021-12-03 PROCEDURE — 99214 OFFICE O/P EST MOD 30 MIN: CPT | Performed by: NURSE PRACTITIONER

## 2021-12-03 PROCEDURE — 85027 COMPLETE CBC AUTOMATED: CPT

## 2021-12-03 PROCEDURE — 84443 ASSAY THYROID STIM HORMONE: CPT

## 2021-12-03 NOTE — PROGRESS NOTES
"  Neurology Progress Note      Chief Complaint:    TIA  Memory Deficits    Subjective     Subjective:  Lavinia Aguilar is a 76 y.o. female who presents today for follow-up of TIA.  She is currently accompanied by her  today.  She was last seen in our office by ISACC Pizano.  She experienced symptoms of TIA such as sudden onset of slurred speech, word finding, drooling, and numbness back in June 2021.  She continues a 1 mg and Lipitor daily.  She denies any bleeding or myalgias.  There are no new reports of any strokelike symptoms or recurrence of her previous symptoms.  However, they do have new concerns for memory problems.  These and not been injured in the past.  Memory problems consist of forgetfulness, forgetting names of people, repeating sentences over and over, and asking questions which are not pertinent to her current conversation.  Of note, she did not remember that she has been seen in our office before.  Her  does report that she is forgetting to do certain tasks such as cleaning, the dishes, laundry, bathing, or other personal hygienic practices.  However, he states that she can functionally do these tasks well when reminded.  She is never had any formal memory testing.  There is no concern of incontinence or shuffling of gait.  She does endorse some sleep disturbance with non-restorative sleep and daytime hypersomnolence.  They are unsure if she snores.  She feels as if she is sleeping well. She does report a fall in July 2021.  They are unsure if she hit her head during this.  No other reports of falls. She has had no new sudden onset of focal neurologic deficits.  She continues to take all her medications as appropriately prescribed.  She is very hyper fixated on the fact that she has \"lost a bunch of weight\" recently.  However, her weight appears to be stable since June at around 102 to 104 pounds.  She overall is doing very well from a stroke standpoint but there is concern " regarding her new memory deficits.  Current MMSE 25/30    Past Medical History:   Diagnosis Date   • Bone/cartilage disorder    • Hyperlipidemia    • Left rotator cuff tear    • PONV (postoperative nausea and vomiting)    • Stroke (HCC)    • TIA (transient ischemic attack)      Past Surgical History:   Procedure Laterality Date   • CARPAL TUNNEL RELEASE     • CERVICAL SPINE SURGERY     • COLONOSCOPY     • COLONOSCOPY  06/19/2013    internal hemorrhoid   • COLONOSCOPY N/A 7/11/2018    Procedure: COLONOSCOPY WITH ANESTHESIA;  Surgeon: Andrew Palm MD;  Location: [facility] ENDOSCOPY;  Service: Gastroenterology   • HAMMER TOE REPAIR     • HYSTERECTOMY     • INSERTION / REMOVAL CRANIAL DBS GENERATOR     • KNEE CARTILAGE SURGERY     • NASAL SEPTUM SURGERY     • RECTAL SURGERY      rectocele   • SHOULDER ROTATOR CUFF REPAIR Left 02/2018   • SHOULDER SURGERY Right    • TONSILLECTOMY     • TUBAL ABDOMINAL LIGATION       Family History   Problem Relation Age of Onset   • Colon polyps Mother    • Colon cancer Neg Hx      Social History     Tobacco Use   • Smoking status: Never Smoker   • Smokeless tobacco: Never Used   Substance Use Topics   • Alcohol use: No   • Drug use: No     Medications:  Current Outpatient Medications   Medication Sig Dispense Refill   • aspirin 81 MG chewable tablet Chew 1 tablet Daily. 30 tablet 5   • Calcium-Vitamin D 500-125 MG-UNIT tablet Take 1 tablet by mouth Daily.     • Multiple Vitamins-Minerals (MULTIVITAMIN ADULT PO) Take 1 tablet by mouth Daily.     • rosuvastatin (CRESTOR) 10 MG tablet Take 1 tablet by mouth Daily. 90 tablet 1     No current facility-administered medications for this visit.     Current outpatient and discharge medications have been reconciled for the patient.  Reviewed by: ISACC Almanzar      Allergies:    Patient has no known allergies.    Review of Systems:   Review of Systems   Constitutional: Negative for activity change, appetite change, fatigue and fever.    Neurological: Positive for tremors and memory problem. Negative for dizziness, seizures, facial asymmetry, weakness, numbness and headache.   Psychiatric/Behavioral: Negative for sleep disturbance and stress.   All other systems reviewed and are negative.    Objective      Vital Signs  Heart Rate:  [102] 102  Resp:  [18] 18  BP: (136)/(70) 136/70    Physical Exam:  Physical Exam  Vitals reviewed.   Constitutional:       Appearance: Normal appearance.   HENT:      Head: Normocephalic.   Eyes:      Extraocular Movements: Extraocular movements intact.      Pupils: Pupils are equal, round, and reactive to light.   Cardiovascular:      Rate and Rhythm: Normal rate and regular rhythm.      Pulses: Normal pulses.   Pulmonary:      Effort: Pulmonary effort is normal.   Musculoskeletal:         General: Normal range of motion.      Cervical back: Normal range of motion and neck supple.   Skin:     General: Skin is warm and dry.      Capillary Refill: Capillary refill takes less than 2 seconds.   Neurological:      Gait: Gait is intact.   Psychiatric:         Mood and Affect: Mood normal.       Neurologic Exam:  Mental Status:    -Awake. Alert. Oriented to person, place & time.  -No word finding difficulties.  -No aphasia.  -No dysarthria.  -Follows simple commands.     CN II:  Full visual fields with confrontation.  Pupils equally reactive to light.  CN III, IV, VI:  Extraocular muscles function intact with no nystagmus.  CN V:  Facial sensory is symmetric.  CN VII:  Facial motor symmetric.  CN VIII:  Gross hearing intact bilaterally.  CN IX/X:  Palate elevates symmetrically.  CN XI:  Shoulder shrug symmetric.  CN XII:  Tongue is midline on protrusion.     Motor: (strength out of 5:  1= minimal movement, 2 = movement in plane of gravity, 3 = movement against gravity, 4 = movement against some resistance, 5 = full strength)     -4+/5 in bilateral biceps, triceps, brachioradialis, wrist extensors and intrinsic muscles of the  hand.    -4+/5 in bilateral hip flexors, quadriceps, hamstrings, gastrocsoleus complex, anterior tibialis and extensor hallucis longus.       Deep Tendon Reflexes:  -Right              Biceps: 2+         Triceps: 2+      Brachioradialis: 2+              Patella: 2+       Ankle: 2+         Babinski:  negative  -Left              Biceps: 2+         Triceps: 2+      Brachioradialis: 2+              Patella: 2+       Ankle: 2+         Babinski:  negative    Tone (Modified Eric Scale):  No appreciable increase in tone or rigidity noted.     Sensory:  -Intact to light touch, pinprick BUE (C5-T1) and BLE (L2-S1).     Coordination:  -Finger to nose intact BUEs  -Heel to shin intact BLEs  -No ataxia     Gait  -No signs of ataxia  -ambulates unassisted    Assessment/Plan     Impression:  • TIA  • Hyperlipidemia  • Essential Tremor  • DBS present.    Plan:  • Continue aspirin 81mg daily.     • Continue crestor 10mg with goal LDL less than 70.       · Other secondary stroke prevention methods such as BP management with goal BP less than 140/90, DM prevention with A1C less than 7%, no smoking, regular physical acitvity, and a balanced diet.     • CT head for updated imaging.  Due to her progressive worsening of her memory, I would like to ensure there is no intercranial process which could be causing her memory changes.  Her and her  do report that she fell back in July 2021.  MRI not warranted due to DBS implantation.      • CBC, CMP, Folate, TSH, Vitamin D, and Vitamin B12.     • Overnight Pulse oximetry to evaluate for nocturnal hypoxia.  She does endorse some sleep disturbance.  I will start with overnight oximetry to evaluate for hypoxia prior to ordering sleep study.       • Speech Therapy consultation for memory evaluation.     The patient and I have discussed the plan of care and she is in full agreement at this time.     Follow-Up:  Return in about 3 months (around 3/3/2022) for TIA/Memory.      Barney AMEZCUA  ISACC Burleson  12/03/21  16:54 CST

## 2021-12-04 LAB
25(OH)D3 SERPL-MCNC: 56.6 NG/ML
FOLATE SERPL-MCNC: >20 NG/ML (ref 4.78–24.2)
VIT B12 BLD-MCNC: 1069 PG/ML (ref 211–946)

## 2021-12-09 ENCOUNTER — TELEPHONE (OUTPATIENT)
Dept: NEUROLOGY | Facility: CLINIC | Age: 76
End: 2021-12-09

## 2021-12-09 NOTE — TELEPHONE ENCOUNTER
Provider: ISACC UBLL  Caller: AMANDA LOUIE  Relationship to Patient: SELF         Reason for Call: PT WANTS THE NURSE TO KNOW THAT SHE COULDN'T DO THE CT HEAD SCAN ON Wednesday 12-8-21 CAUSE HER  WAS OUT OF TOWN. BUT SHE IS AVAILABLE NOW TO HAVE IT DONE.BUT SHE & HER  BOTH HAS APPT ON 12-15-21 12-15-21 AND SHE WILL NOT BE AVAILABLE..AFTER THE  SHE IS AVAILABLE.      IF YOU HAVE ANY QUESTIONS PLEASE CALL HER [phone]

## 2021-12-10 ENCOUNTER — TELEPHONE (OUTPATIENT)
Dept: NEUROLOGY | Facility: CLINIC | Age: 76
End: 2021-12-10

## 2021-12-10 NOTE — TELEPHONE ENCOUNTER
Caller: Lavinia Aguilar    Relationship: Self    Best call back number: [phone]    What was the call regarding: PT RETURNING PHONE CALL TO KALYANI Clement warm-transferred to: YELENA    DOCUMENTING PER [employer].

## 2022-01-03 ENCOUNTER — TELEPHONE (OUTPATIENT)
Dept: NEUROLOGY | Facility: CLINIC | Age: 77
End: 2022-01-03

## 2022-01-03 NOTE — TELEPHONE ENCOUNTER
Caller: ALEXANDER  Relationship to Patient:   [phone]      Reason for Call: PT'S  CALLED  TO STATE PT RECEIVED A JURY DUTY REQUEST, HE WAS WONDERING IF THEY CAN RECEIVE A LETTER STATING THAT SHE IS UNABLE TO DO SO, PLEASE REVIEW AND ADVISE.     HE ALSO WANTED TO KNOW WHO COULD SEE HIS WIFE FOR DBS SO THEY NO LONGER HAVE TO DRIVE TO [place] ? I WAS UNAWARE OF THE ANSWER TO THAT QUESTION.

## 2022-01-03 NOTE — TELEPHONE ENCOUNTER
Mic notified Barney is out of the office today.  I will send a message about the jury duty letter.  Explained that we need to see the records from [facility] about the DBS.  He said they will be in [place] the next day or so.  Requested they come to the office to sign the release so we can view the records.  He said the DBS was checked in April and they don't have an appointment at [facility].

## 2022-01-19 RX ORDER — ROSUVASTATIN CALCIUM 10 MG/1
10 TABLET, COATED ORAL DAILY
Qty: 90 TABLET | Refills: 1 | Status: SHIPPED | OUTPATIENT
Start: 2022-01-19 | End: 2022-12-14 | Stop reason: SDUPTHER

## 2022-01-19 NOTE — TELEPHONE ENCOUNTER
Caller: Lauren Lavinia J    Relationship: Self    Best call back number: [phone]    Requested Prescriptions:   Requested Prescriptions     Pending Prescriptions Disp Refills   • rosuvastatin (CRESTOR) 10 MG tablet 90 tablet 1     Sig: Take 1 tablet by mouth Daily.        Pharmacy where request should be sent: [facility] [address] [place] [address] DIONE LAKHANI [facility] [phone]  - [phone] FX     Additional details provided by patient:   PATIENT IS RUNNING LOW ON MEDICATION AND IS REQUESTING 90 DAYS    Does the patient have less than a 3 day supply:  [x] Yes  [] No    Leyda Parikh, PCT   01/19/22 09:11 CST

## 2022-01-24 ENCOUNTER — TELEPHONE (OUTPATIENT)
Dept: PHYSICAL THERAPY | Facility: CLINIC | Age: 77
End: 2022-01-24

## 2022-03-01 ENCOUNTER — TELEPHONE (OUTPATIENT)
Dept: NEUROLOGY | Facility: CLINIC | Age: 77
End: 2022-03-01
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
## 2022-03-02 ENCOUNTER — TELEPHONE (OUTPATIENT)
Dept: PHYSICAL THERAPY | Facility: CLINIC | Age: 77
End: 2022-03-02

## 2022-03-02 ENCOUNTER — TELEPHONE (OUTPATIENT)
Dept: NEUROLOGY | Facility: CLINIC | Age: 77
End: 2022-03-02

## 2022-03-02 DIAGNOSIS — R41.3 MEMORY CHANGES: Primary | ICD-10-CM

## 2022-03-02 NOTE — PROGRESS NOTES
Patient's family has called with further complaints of memory concerns.  She is becoming more forgetful and is having confusion episodes.  Will repeat labs and get UA to check for metabolic changes.  Continue with CT head.  Will also get her back into ST as she has refused it per documentation.

## 2022-03-02 NOTE — TELEPHONE ENCOUNTER
SLP received new order for Memory evaluation from ISACC Chaney, patient had previously refused when we attempted to schedule. I called today and spoke to her  Mic about scheduling the evaluation and he stated that she was saying, no - she would not participate in the evaluation. SLP will notify Barney. Please let us know if we can be of any further assistance.  Lavinia Burleson, ASIF-SLP 3/2/2022 17:20 CST
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
## 2022-03-08 ENCOUNTER — LAB (OUTPATIENT)
Dept: LAB | Facility: HOSPITAL | Age: 77
End: 2022-03-08

## 2022-03-08 DIAGNOSIS — R41.3 MEMORY CHANGES: ICD-10-CM

## 2022-03-08 LAB
ALBUMIN SERPL-MCNC: 4.1 G/DL (ref 3.5–5.2)
ALBUMIN/GLOB SERPL: 1.6 G/DL
ALP SERPL-CCNC: 69 U/L (ref 39–117)
ALT SERPL W P-5'-P-CCNC: 18 U/L (ref 1–33)
ANION GAP SERPL CALCULATED.3IONS-SCNC: 7 MMOL/L (ref 5–15)
AST SERPL-CCNC: 20 U/L (ref 1–32)
BACTERIA UR QL AUTO: ABNORMAL /HPF
BASOPHILS # BLD AUTO: 0.02 10*3/MM3 (ref 0–0.2)
BASOPHILS NFR BLD AUTO: 0.4 % (ref 0–1.5)
BILIRUB SERPL-MCNC: 0.3 MG/DL (ref 0–1.2)
BILIRUB UR QL STRIP: NEGATIVE
BUN SERPL-MCNC: 12 MG/DL (ref 8–23)
BUN/CREAT SERPL: 21.1 (ref 7–25)
CALCIUM SPEC-SCNC: 8.9 MG/DL (ref 8.6–10.5)
CHLORIDE SERPL-SCNC: 105 MMOL/L (ref 98–107)
CLARITY UR: ABNORMAL
CO2 SERPL-SCNC: 29 MMOL/L (ref 22–29)
COLOR UR: YELLOW
CREAT SERPL-MCNC: 0.57 MG/DL (ref 0.57–1)
DEPRECATED RDW RBC AUTO: 45.8 FL (ref 37–54)
EGFRCR SERPLBLD CKD-EPI 2021: 94.3 ML/MIN/1.73
EOSINOPHIL # BLD AUTO: 0.07 10*3/MM3 (ref 0–0.4)
EOSINOPHIL NFR BLD AUTO: 1.3 % (ref 0.3–6.2)
ERYTHROCYTE [DISTWIDTH] IN BLOOD BY AUTOMATED COUNT: 12.6 % (ref 12.3–15.4)
GLOBULIN UR ELPH-MCNC: 2.6 GM/DL
GLUCOSE SERPL-MCNC: 152 MG/DL (ref 65–99)
GLUCOSE UR STRIP-MCNC: NEGATIVE MG/DL
HCT VFR BLD AUTO: 40.5 % (ref 34–46.6)
HGB BLD-MCNC: 13.5 G/DL (ref 12–15.9)
HGB UR QL STRIP.AUTO: NEGATIVE
HYALINE CASTS UR QL AUTO: ABNORMAL /LPF
IMM GRANULOCYTES # BLD AUTO: 0.01 10*3/MM3 (ref 0–0.05)
IMM GRANULOCYTES NFR BLD AUTO: 0.2 % (ref 0–0.5)
KETONES UR QL STRIP: NEGATIVE
LEUKOCYTE ESTERASE UR QL STRIP.AUTO: ABNORMAL
LYMPHOCYTES # BLD AUTO: 1.77 10*3/MM3 (ref 0.7–3.1)
LYMPHOCYTES NFR BLD AUTO: 33.6 % (ref 19.6–45.3)
MCH RBC QN AUTO: 32.8 PG (ref 26.6–33)
MCHC RBC AUTO-ENTMCNC: 33.3 G/DL (ref 31.5–35.7)
MCV RBC AUTO: 98.3 FL (ref 79–97)
MONOCYTES # BLD AUTO: 0.35 10*3/MM3 (ref 0.1–0.9)
MONOCYTES NFR BLD AUTO: 6.6 % (ref 5–12)
NEUTROPHILS NFR BLD AUTO: 3.05 10*3/MM3 (ref 1.7–7)
NEUTROPHILS NFR BLD AUTO: 57.9 % (ref 42.7–76)
NITRITE UR QL STRIP: POSITIVE
NRBC BLD AUTO-RTO: 0 /100 WBC (ref 0–0.2)
PH UR STRIP.AUTO: 8 [PH] (ref 5–8)
PLATELET # BLD AUTO: 187 10*3/MM3 (ref 140–450)
PMV BLD AUTO: 10.2 FL (ref 6–12)
POTASSIUM SERPL-SCNC: 3.8 MMOL/L (ref 3.5–5.2)
PROT SERPL-MCNC: 6.7 G/DL (ref 6–8.5)
PROT UR QL STRIP: NEGATIVE
RBC # BLD AUTO: 4.12 10*6/MM3 (ref 3.77–5.28)
RBC # UR STRIP: ABNORMAL /HPF
REF LAB TEST METHOD: ABNORMAL
SODIUM SERPL-SCNC: 141 MMOL/L (ref 136–145)
SP GR UR STRIP: 1.02 (ref 1–1.03)
SQUAMOUS #/AREA URNS HPF: ABNORMAL /HPF
UROBILINOGEN UR QL STRIP: ABNORMAL
WBC # UR STRIP: ABNORMAL /HPF
WBC NRBC COR # BLD: 5.27 10*3/MM3 (ref 3.4–10.8)

## 2022-03-08 PROCEDURE — 36415 COLL VENOUS BLD VENIPUNCTURE: CPT

## 2022-03-08 PROCEDURE — 87086 URINE CULTURE/COLONY COUNT: CPT

## 2022-03-08 PROCEDURE — 87088 URINE BACTERIA CULTURE: CPT

## 2022-03-08 PROCEDURE — 80053 COMPREHEN METABOLIC PANEL: CPT

## 2022-03-08 PROCEDURE — 81001 URINALYSIS AUTO W/SCOPE: CPT

## 2022-03-08 PROCEDURE — 87186 SC STD MICRODIL/AGAR DIL: CPT

## 2022-03-08 PROCEDURE — 85025 COMPLETE CBC W/AUTO DIFF WBC: CPT

## 2022-03-09 ENCOUNTER — TELEPHONE (OUTPATIENT)
Dept: FAMILY MEDICINE CLINIC | Facility: CLINIC | Age: 77
End: 2022-03-09

## 2022-03-09 ENCOUNTER — OFFICE VISIT (OUTPATIENT)
Dept: NEUROLOGY | Facility: CLINIC | Age: 77
End: 2022-03-09

## 2022-03-09 VITALS
HEIGHT: 63 IN | OXYGEN SATURATION: 96 % | HEART RATE: 106 BPM | WEIGHT: 98 LBS | SYSTOLIC BLOOD PRESSURE: 138 MMHG | RESPIRATION RATE: 17 BRPM | DIASTOLIC BLOOD PRESSURE: 80 MMHG | BODY MASS INDEX: 17.36 KG/M2

## 2022-03-09 DIAGNOSIS — E78.2 MIXED HYPERLIPIDEMIA: ICD-10-CM

## 2022-03-09 DIAGNOSIS — R41.3 MEMORY CHANGES: Primary | ICD-10-CM

## 2022-03-09 PROCEDURE — 99214 OFFICE O/P EST MOD 30 MIN: CPT | Performed by: NURSE PRACTITIONER

## 2022-03-09 RX ORDER — CIPROFLOXACIN 500 MG/1
500 TABLET, FILM COATED ORAL EVERY 12 HOURS SCHEDULED
Qty: 14 TABLET | Refills: 0 | Status: SHIPPED | OUTPATIENT
Start: 2022-03-09 | End: 2022-03-16

## 2022-03-09 NOTE — PROGRESS NOTES
Neurology Progress Note      Chief Complaint:    Memory Changes    Subjective     Subjective:  Lavinia Aguilar is a 76 y.o. female who presents today for memory changes.  She is routinely followed by Dr. Praveen Leung MD for primary care.  She was last seen by me on 12/3/2021 where she was a follow-up from stroke and had complaints of memory problems.  Since last being seen her  called and stated that she was having worsening confusion and not recognizing people.  I ordered labs and UA which did reveal an UTI.  This is likely cause of her acute worsening in confusion.  She otherwise has had no further change.  Her MMSE today was 24/30 and her previous was 26/30.  She continues with no stroke symptoms and continues with Crestor and aspirin.  She is otherwise stable with her ADL's with no changes.  She has yet to have CT scan as ordered. She has denies ST evaluation for memory but I have advised this would be recommended to get baseline memory evaluation.  There are no further questions.      Past Medical History:   Diagnosis Date   • Bone/cartilage disorder    • Hyperlipidemia    • Left rotator cuff tear    • PONV (postoperative nausea and vomiting)    • Stroke (HCC)    • TIA (transient ischemic attack)      Past Surgical History:   Procedure Laterality Date   • CARPAL TUNNEL RELEASE     • CERVICAL SPINE SURGERY     • COLONOSCOPY     • COLONOSCOPY  06/19/2013    internal hemorrhoid   • COLONOSCOPY N/A 7/11/2018    Procedure: COLONOSCOPY WITH ANESTHESIA;  Surgeon: Andrew Palm MD;  Location: [facility] ENDOSCOPY;  Service: Gastroenterology   • HAMMER TOE REPAIR     • HYSTERECTOMY     • INSERTION / REMOVAL CRANIAL DBS GENERATOR     • KNEE CARTILAGE SURGERY     • NASAL SEPTUM SURGERY     • RECTAL SURGERY      rectocele   • SHOULDER ROTATOR CUFF REPAIR Left 02/2018   • SHOULDER SURGERY Right    • TONSILLECTOMY     • TUBAL ABDOMINAL LIGATION       Family History   Problem Relation Age of Onset   • Colon polyps  Mother    • Colon cancer Neg Hx      Social History     Tobacco Use   • Smoking status: Never Smoker   • Smokeless tobacco: Never Used   Substance Use Topics   • Alcohol use: No   • Drug use: No     Medications:  Current Outpatient Medications   Medication Sig Dispense Refill   • aspirin 81 MG chewable tablet Chew 1 tablet Daily. 30 tablet 5   • Calcium-Vitamin D 500-125 MG-UNIT tablet Take 1 tablet by mouth Daily.     • Multiple Vitamins-Minerals (MULTIVITAMIN ADULT PO) Take 1 tablet by mouth Daily.     • rosuvastatin (CRESTOR) 10 MG tablet Take 1 tablet by mouth Daily. 90 tablet 1   • ciprofloxacin (Cipro) 500 MG tablet Take 1 tablet by mouth Every 12 (Twelve) Hours for 7 days. 14 tablet 0     No current facility-administered medications for this visit.     Current outpatient and discharge medications have been reconciled for the patient.  Reviewed by: ISACC Almanzar      Allergies:    Patient has no known allergies.    Review of Systems:   Review of Systems   Neurological: Positive for memory problem and confusion. Negative for facial asymmetry, weakness and headache.   All other systems reviewed and are negative.    Objective      Vital Signs  Heart Rate:  [106] 106  Resp:  [17] 17  BP: (138)/(80) 138/80    Physical Exam:  Physical Exam  Vitals reviewed.   Constitutional:       Appearance: Normal appearance.   HENT:      Head: Normocephalic.   Eyes:      Extraocular Movements: Extraocular movements intact.      Pupils: Pupils are equal, round, and reactive to light.   Cardiovascular:      Rate and Rhythm: Normal rate and regular rhythm.      Pulses: Normal pulses.   Pulmonary:      Effort: Pulmonary effort is normal.   Musculoskeletal:         General: Normal range of motion.      Cervical back: Normal range of motion and neck supple.   Skin:     General: Skin is warm and dry.      Capillary Refill: Capillary refill takes less than 2 seconds.   Neurological:      Gait: Gait is intact.   Psychiatric:          Mood and Affect: Mood normal.         Cognition and Memory: Memory is impaired. She exhibits impaired recent memory.       Neurologic Exam:  Mental Status:    -Awake. Alert. Oriented to person, place.   Disoriented to time.  Unable to state year, president, and name of hospital.   -No word finding difficulties.  -No aphasia.  -No dysarthria.  -Follows simple commands.     CN II:  Full visual fields with confrontation.  Pupils equally reactive to light.  CN III, IV, VI:  Extraocular muscles function intact with no nystagmus.  CN V:  Facial sensory is symmetric.  CN VII:  Facial motor symmetric.  CN VIII:  Gross hearing intact bilaterally.  CN IX/X:  Palate elevates symmetrically.  CN XI:  Shoulder shrug symmetric.  CN XII:  Tongue is midline on protrusion.     Motor: (strength out of 5:  1= minimal movement, 2 = movement in plane of gravity, 3 = movement against gravity, 4 = movement against some resistance, 5 = full strength)     -5/5 in bilateral biceps, triceps, brachioradialis, wrist extensors and intrinsic muscles of the hand.    -5/5 in bilateral hip flexors, quadriceps, hamstrings, gastrocsoleus complex, anterior tibialis and extensor hallucis longus.       Deep Tendon Reflexes:  -Right              Biceps: 2+         Triceps: 2+      Brachioradialis: 2+              Patella: 2+       Ankle: 2+         Babinski:  negative  -Left              Biceps: 2+         Triceps: 2+      Brachioradialis: 2+              Patella: 2+       Ankle: 2+         Babinski:  negative    Tone (Modified Eric Scale):  No appreciable increase in tone or rigidity noted.     Sensory:  -Intact to light touch, pinprick BUE (C5-T1) and BLE (L2-S1).     Coordination:  -Finger to nose intact BUEs  -Heel to shin intact BLEs  -No ataxia     Gait  -No signs of ataxia  -ambulates unassisted       Results Review:    Lab Results   Component Value Date    GLUCOSE 152 (H) 03/08/2022    BUN 12 03/08/2022    CREATININE 0.57 03/08/2022     EGFRIFNONA 74 12/03/2021    EGFRIFAFRI 125 10/25/2021    BCR 21.1 03/08/2022    K 3.8 03/08/2022    CO2 29.0 03/08/2022    CALCIUM 8.9 03/08/2022    PROTENTOTREF 6.7 10/25/2021    ALBUMIN 4.10 03/08/2022    LABIL2 1.9 10/25/2021    AST 20 03/08/2022    ALT 18 03/08/2022     Lab Results   Component Value Date    WBC 5.27 03/08/2022    HGB 13.5 03/08/2022    HCT 40.5 03/08/2022    MCV 98.3 (H) 03/08/2022     03/08/2022     Lab Results   Component Value Date    TSH 3.900 12/03/2021     Lab Results   Component Value Date    FOLATE >20.00 12/03/2021     Lab Results   Component Value Date    HYWQXYIL21 1,069 (H) 12/03/2021     UA    Urinalysis 3/8/22 3/8/22    1351 1351   Squamous Epithelial Cells, UA  3-6 (A)   Specific Gravity, UA 1.022    Ketones, UA Negative    Blood, UA Negative    Leukocytes, UA Small (1+) (A)    Nitrite, UA Positive (A)    RBC, UA  3-5 (A)   WBC, UA  3-5 (A)   Bacteria, UA  4+ (A)   (A) Abnormal value        Assessment/Plan     Impression:  • Memory Changes  • TIA  • UTI    Plan:  • Continue aspirin 81mg daily.      • Continue crestor 10mg daily. Goal LDL less than 70.     • Other secondary stroke prevention methods such as BP management with goal BP less than 140/90, DM prevention with A1C less than 7%, no smoking, regular physical acitvity, and a balanced diet.  This is to be managed with PCP.     • Will reach out to [facility] office for recommendations of antibiotic for her UTI. This is likely the cause of her acute confusion.      • Continue with CT head.     • Will order overnight pulse ox to ensure she is doing with her sleep as this has been reported in the past. Would like to check for hypoxia.      • I have again discussed ST referral and the patient is agreeable to this.  We will reach back out to speech to determine if they are able to get her scheduled.      The patient and I have discussed the plan of care and she is in full agreement at this time.     Follow-Up:  • Return in  about 2 months (around 5/9/2022) for Memory.      Barney Burleson, APRN  03/09/22  12:36 CST

## 2022-03-09 NOTE — TELEPHONE ENCOUNTER
Yes on your counter pt is in [place] now and uses [facility] and does not want to drive home then have to drive back to [facility]

## 2022-03-09 NOTE — TELEPHONE ENCOUNTER
Jessie with neurology called and stated she is in for a normal folow up but had labs and ua yesterday and she has a bad uti and they asked that you send in medication to [facility] in [place]

## 2022-03-10 LAB — BACTERIA SPEC AEROBE CULT: ABNORMAL

## 2022-04-12 ENCOUNTER — NURSE TRIAGE (OUTPATIENT)
Dept: CALL CENTER | Facility: HOSPITAL | Age: 77
End: 2022-04-12

## 2022-04-12 ENCOUNTER — TELEPHONE (OUTPATIENT)
Dept: FAMILY MEDICINE CLINIC | Facility: CLINIC | Age: 77
End: 2022-04-12

## 2022-04-12 NOTE — TELEPHONE ENCOUNTER
"Caller advised to call office back after lunch. Caller agrees to follow care advice.     Reason for Disposition  • [1] Caller requesting NON-URGENT health information AND [2] PCP's office is the best resource    Additional Information  • Negative: [1] Caller is not with the adult (patient) AND [2] reporting urgent symptoms  • Negative: Lab result questions  • Negative: Medication questions  • Negative: Caller can't be reached by phone  • Negative: Caller has already spoken to PCP or another triager  • Negative: RN needs further essential information from caller in order to complete triage  • Negative: Requesting regular office appointment    Answer Assessment - Initial Assessment Questions  1. REASON FOR CALL or QUESTION: \"What is your reason for calling today?\" or \"How can I best help you?\" or \"What question do you have that I can help answer?\"      Caller is asking for a letter from his wife's PCP stating that she is no longer capable of making decisions for her self. He is trying to get POA for her.    Protocols used: INFORMATION ONLY CALL - NO TRIAGE-ADULT-      "

## 2022-04-12 NOTE — TELEPHONE ENCOUNTER
Caller: Mic Aguilar    Relationship: Emergency Contact    Best call back number: [phone]    What is the best time to reach you: ANY    Who are you requesting to speak with (clinical staff, provider,  specific staff member): CLINICAL STAFF OR PROVIDER      What was the call regarding: THE PATIENT'S  CALLED AND STATED THAT HIS WIFE'S MEMORY HAS GOTTEN WORSE AND THAT SHE DOES NOT REMEMBER WHO HE IS OR HER CHILDREN AT TIMES. THE PATIENT'S  STATED THAT HE WOULD LIKE THE CLINICAL STAFF TO BE AWARE BEFORE HER APPOINTMENT ON THURSDAY. THE  WOULD LIKE A MEMBER OF THE CLINICAL TEAM TO CALL HIM BACK.    Do you require a callback:YES

## 2022-04-14 ENCOUNTER — OFFICE VISIT (OUTPATIENT)
Dept: FAMILY MEDICINE CLINIC | Facility: CLINIC | Age: 77
End: 2022-04-14

## 2022-04-14 VITALS
DIASTOLIC BLOOD PRESSURE: 76 MMHG | OXYGEN SATURATION: 98 % | BODY MASS INDEX: 17.61 KG/M2 | WEIGHT: 99.4 LBS | SYSTOLIC BLOOD PRESSURE: 124 MMHG | HEIGHT: 63 IN | RESPIRATION RATE: 16 BRPM | HEART RATE: 98 BPM

## 2022-04-14 DIAGNOSIS — R63.4 WEIGHT LOSS: Primary | ICD-10-CM

## 2022-04-14 DIAGNOSIS — R41.3 MEMORY LOSS: ICD-10-CM

## 2022-04-14 PROCEDURE — 99213 OFFICE O/P EST LOW 20 MIN: CPT | Performed by: FAMILY MEDICINE

## 2022-04-14 RX ORDER — MEGESTROL ACETATE 40 MG/ML
400 SUSPENSION ORAL DAILY
Qty: 480 ML | Refills: 1 | Status: SHIPPED | OUTPATIENT
Start: 2022-04-14

## 2022-04-14 NOTE — PROGRESS NOTES
Subjective   Lavinia Aguilar is a 76 y.o. female.     Chief Complaint   Patient presents with   • Weight Loss     Pt states that since she had a fall approx 9 mo ago she has not been able to gain weight and has lost some weight as well.          History of Present Illness     here today with her --she does have hx of memory loss and has been followed by neurology..--she is worried about her weight loss and loss of appetitie      Current Outpatient Medications:   •  aspirin 81 MG chewable tablet, Chew 1 tablet Daily., Disp: 30 tablet, Rfl: 5  •  Calcium-Vitamin D 500-125 MG-UNIT tablet, Take 1 tablet by mouth Daily., Disp: , Rfl:   •  Multiple Vitamins-Minerals (MULTIVITAMIN ADULT PO), Take 1 tablet by mouth Daily., Disp: , Rfl:   •  rosuvastatin (CRESTOR) 10 MG tablet, Take 1 tablet by mouth Daily., Disp: 90 tablet, Rfl: 1  •  megestrol (MEGACE) 40 MG/ML suspension, Take 10 mL by mouth Daily., Disp: 480 mL, Rfl: 1  No Known Allergies    Patient's Body mass index is 17.61 kg/m². indicating that she is underweight (BMI < 18.5). Recommendations include: treating the underlying disease process.      Past Medical History:   Diagnosis Date   • Bone/cartilage disorder    • Hyperlipidemia    • Left rotator cuff tear    • PONV (postoperative nausea and vomiting)    • Stroke (HCC)    • TIA (transient ischemic attack)      Past Surgical History:   Procedure Laterality Date   • CARPAL TUNNEL RELEASE     • CERVICAL SPINE SURGERY     • COLONOSCOPY     • COLONOSCOPY  06/19/2013    internal hemorrhoid   • COLONOSCOPY N/A 7/11/2018    Procedure: COLONOSCOPY WITH ANESTHESIA;  Surgeon: Andrew Palm MD;  Location: [facility] ENDOSCOPY;  Service: Gastroenterology   • HAMMER TOE REPAIR     • HYSTERECTOMY     • INSERTION / REMOVAL CRANIAL DBS GENERATOR     • KNEE CARTILAGE SURGERY     • NASAL SEPTUM SURGERY     • RECTAL SURGERY      rectocele   • SHOULDER ROTATOR CUFF REPAIR Left 02/2018   • SHOULDER SURGERY Right    •  "TONSILLECTOMY     • TUBAL ABDOMINAL LIGATION         Review of Systems   Constitutional: Negative.    HENT: Negative.    Eyes: Negative.    Respiratory: Negative.    Cardiovascular: Negative.    Gastrointestinal: Negative.    Endocrine: Negative.    Genitourinary: Negative.    Musculoskeletal: Negative.    Skin: Negative.    Neurological: Negative.    Hematological: Negative.    Psychiatric/Behavioral: Positive for behavioral problems and confusion.       Objective  /76   Pulse 98   Resp 16   Ht 160 cm (63\")   Wt 45.1 kg (99 lb 6.4 oz)   SpO2 98%   BMI 17.61 kg/m²   Physical Exam  Vitals and nursing note reviewed.   Constitutional:       Appearance: Normal appearance. She is normal weight.   HENT:      Head: Normocephalic and atraumatic.      Nose: Nose normal.      Mouth/Throat:      Pharynx: Oropharynx is clear.   Eyes:      Extraocular Movements: Extraocular movements intact.      Conjunctiva/sclera: Conjunctivae normal.      Pupils: Pupils are equal, round, and reactive to light.   Cardiovascular:      Rate and Rhythm: Normal rate and regular rhythm.      Pulses: Normal pulses.      Heart sounds: Normal heart sounds.   Pulmonary:      Effort: Pulmonary effort is normal.      Breath sounds: Normal breath sounds.   Abdominal:      General: Abdomen is flat. Bowel sounds are normal.      Palpations: Abdomen is soft.   Musculoskeletal:         General: Normal range of motion.      Cervical back: Normal range of motion and neck supple.   Skin:     General: Skin is warm and dry.      Capillary Refill: Capillary refill takes less than 2 seconds.   Neurological:      Mental Status: She is alert. She is disoriented.   Psychiatric:         Mood and Affect: Mood normal.         Behavior: Behavior normal.         Thought Content: Thought content normal.         Judgment: Judgment normal.         Assessment/Plan   Diagnoses and all orders for this visit:    1. Weight loss (Primary)  -     CBC & Differential  -     " Comprehensive metabolic panel  -     Urinalysis With Microscopic - Urine, Clean Catch  -     Urine Culture - Urine, Urine, Clean Catch    2. Memory loss  -     CBC & Differential  -     Comprehensive metabolic panel  -     Urinalysis With Microscopic - Urine, Clean Catch  -     Urine Culture - Urine, Urine, Clean Catch    Other orders  -     megestrol (MEGACE) 40 MG/ML suspension; Take 10 mL by mouth Daily.  Dispense: 480 mL; Refill: 1                 Orders Placed This Encounter   Procedures   • Urine Culture - Urine, Urine, Clean Catch     Order Specific Question:   Release to patient     Answer:   Immediate   • Comprehensive metabolic panel     Order Specific Question:   Release to patient     Answer:   Immediate   • CBC & Differential   • Urinalysis With Microscopic - Urine, Clean Catch     Order Specific Question:   Release to patient     Answer:   Immediate       Follow up: 4 week(s)

## 2022-04-15 ENCOUNTER — NURSE TRIAGE (OUTPATIENT)
Dept: CALL CENTER | Facility: HOSPITAL | Age: 77
End: 2022-04-15

## 2022-04-15 NOTE — TELEPHONE ENCOUNTER
"Wanting to know about the appointment for spouse. With whom and office number . Given information from [employer]    Reason for Disposition  • Health Information question, no triage required and triager able to answer question    Additional Information  • Negative: [1] Caller is not with the adult (patient) AND [2] reporting urgent symptoms  • Negative: Lab result questions  • Negative: Medication questions  • Negative: Caller can't be reached by phone  • Negative: Caller has already spoken to PCP or another triager  • Negative: RN needs further essential information from caller in order to complete triage  • Negative: Requesting regular office appointment  • Negative: [1] Caller requesting NON-URGENT health information AND [2] PCP's office is the best resource    Answer Assessment - Initial Assessment Questions  1. REASON FOR CALL or QUESTION: \"What is your reason for calling today?\" or \"How can I best help you?\" or \"What question do you have that I can help answer?\"      Wanting to know when spouse has an appointment    Protocols used: INFORMATION ONLY CALL - NO TRIAGE-ADULT-      "

## 2022-04-19 LAB
ALBUMIN SERPL-MCNC: 4.3 G/DL (ref 3.5–5.2)
ALBUMIN/GLOB SERPL: 1.9 G/DL
ALP SERPL-CCNC: 72 U/L (ref 39–117)
ALT SERPL-CCNC: 14 U/L (ref 1–33)
APPEARANCE UR: CLEAR
AST SERPL-CCNC: 15 U/L (ref 1–32)
BACTERIA #/AREA URNS HPF: NORMAL /HPF
BACTERIA UR CULT: ABNORMAL
BACTERIA UR CULT: ABNORMAL
BASOPHILS # BLD AUTO: 0.01 10*3/MM3 (ref 0–0.2)
BASOPHILS NFR BLD AUTO: 0.1 % (ref 0–1.5)
BILIRUB SERPL-MCNC: 0.3 MG/DL (ref 0–1.2)
BILIRUB UR QL STRIP: NEGATIVE
BUN SERPL-MCNC: 15 MG/DL (ref 8–23)
BUN/CREAT SERPL: 12.2 (ref 7–25)
CALCIUM SERPL-MCNC: 9.6 MG/DL (ref 8.6–10.5)
CHLORIDE SERPL-SCNC: 100 MMOL/L (ref 98–107)
CO2 SERPL-SCNC: 32.2 MMOL/L (ref 22–29)
COLOR UR: YELLOW
CREAT SERPL-MCNC: 1.23 MG/DL (ref 0.57–1)
CRYSTALS URNS MICRO: NORMAL
EGFRCR SERPLBLD CKD-EPI 2021: 45.6 ML/MIN/1.73
EOSINOPHIL # BLD AUTO: 0.09 10*3/MM3 (ref 0–0.4)
EOSINOPHIL NFR BLD AUTO: 1.1 % (ref 0.3–6.2)
EPI CELLS #/AREA URNS HPF: NORMAL /HPF
ERYTHROCYTE [DISTWIDTH] IN BLOOD BY AUTOMATED COUNT: 11.8 % (ref 12.3–15.4)
GLOBULIN SER CALC-MCNC: 2.3 GM/DL
GLUCOSE SERPL-MCNC: 148 MG/DL (ref 65–99)
GLUCOSE UR QL STRIP: ABNORMAL
HCT VFR BLD AUTO: 43.6 % (ref 34–46.6)
HGB BLD-MCNC: 14.2 G/DL (ref 12–15.9)
HGB UR QL STRIP: NEGATIVE
IMM GRANULOCYTES # BLD AUTO: 0.02 10*3/MM3 (ref 0–0.05)
IMM GRANULOCYTES NFR BLD AUTO: 0.3 % (ref 0–0.5)
KETONES UR QL STRIP: ABNORMAL
LEUKOCYTE ESTERASE UR QL STRIP: NEGATIVE
LYMPHOCYTES # BLD AUTO: 1.66 10*3/MM3 (ref 0.7–3.1)
LYMPHOCYTES NFR BLD AUTO: 21.2 % (ref 19.6–45.3)
MCH RBC QN AUTO: 32.6 PG (ref 26.6–33)
MCHC RBC AUTO-ENTMCNC: 32.6 G/DL (ref 31.5–35.7)
MCV RBC AUTO: 100.2 FL (ref 79–97)
MONOCYTES # BLD AUTO: 0.47 10*3/MM3 (ref 0.1–0.9)
MONOCYTES NFR BLD AUTO: 6 % (ref 5–12)
NEUTROPHILS # BLD AUTO: 5.58 10*3/MM3 (ref 1.7–7)
NEUTROPHILS NFR BLD AUTO: 71.3 % (ref 42.7–76)
NITRITE UR QL STRIP: NEGATIVE
NRBC BLD AUTO-RTO: 0 /100 WBC (ref 0–0.2)
OTHER ANTIBIOTIC SUSC ISLT: ABNORMAL
PH UR STRIP: 5.5 [PH] (ref 5–8)
PLATELET # BLD AUTO: 196 10*3/MM3 (ref 140–450)
POTASSIUM SERPL-SCNC: 3.4 MMOL/L (ref 3.5–5.2)
PROT SERPL-MCNC: 6.6 G/DL (ref 6–8.5)
PROT UR QL STRIP: NEGATIVE
RBC # BLD AUTO: 4.35 10*6/MM3 (ref 3.77–5.28)
RBC #/AREA URNS HPF: NORMAL /HPF
SODIUM SERPL-SCNC: 141 MMOL/L (ref 136–145)
SP GR UR STRIP: ABNORMAL (ref 1–1.03)
UROBILINOGEN UR STRIP-MCNC: ABNORMAL MG/DL
WBC # BLD AUTO: 7.83 10*3/MM3 (ref 3.4–10.8)
WBC #/AREA URNS HPF: NORMAL /HPF

## 2022-04-19 RX ORDER — PENICILLIN V POTASSIUM 500 MG/1
500 TABLET ORAL 2 TIMES DAILY
Qty: 28 TABLET | Refills: 0 | Status: SHIPPED | OUTPATIENT
Start: 2022-04-19 | End: 2022-05-03

## 2022-04-29 ENCOUNTER — TELEPHONE (OUTPATIENT)
Dept: NEUROLOGY | Facility: CLINIC | Age: 77
End: 2022-04-29

## 2022-04-29 ENCOUNTER — TELEPHONE (OUTPATIENT)
Dept: FAMILY MEDICINE CLINIC | Facility: CLINIC | Age: 77
End: 2022-04-29

## 2022-04-29 DIAGNOSIS — R30.0 DYSURIA: Primary | ICD-10-CM

## 2022-04-29 DIAGNOSIS — R41.0 CONFUSION: ICD-10-CM

## 2022-04-29 NOTE — TELEPHONE ENCOUNTER
Provider: JOHANN HUGHES  Caller: ALEXANDER  Relationship to Patient:    Phone [phone]  Reason for Call: SPOUSE STATES THAT HE WAS TOLD TO GET A LETTER FOR THE PT NOT BEING ABLE TO ATTEND TO HER OWN FINANCES. PLEASE CALL SPOUSE BACK ABOUT THIS.

## 2022-04-29 NOTE — TELEPHONE ENCOUNTER
I spoke to Mr. Aguilar again today and we discussed the AMS he is concerned about with his wife. Pt with an ongoing UTI being treated and managed by PCP  Office. I advised Mr. Aguilar to call her PCP and discuss this with them and to let them know what's going on. Pt is already being tx with PCN per Dr. Leung and pt has been advised to redo labs and urinalysis in a couple weeks. I also advised him to call for 911 for assistance if needed. Mr. Aguilar understood. He will call Dr. Leung office and discuss the UTI. I advised him to call us back if needed. LS

## 2022-05-03 ENCOUNTER — OFFICE VISIT (OUTPATIENT)
Dept: NEUROLOGY | Facility: CLINIC | Age: 77
End: 2022-05-03

## 2022-05-03 ENCOUNTER — TELEPHONE (OUTPATIENT)
Dept: NEUROLOGY | Facility: CLINIC | Age: 77
End: 2022-05-03

## 2022-05-03 VITALS
HEIGHT: 63 IN | HEART RATE: 103 BPM | OXYGEN SATURATION: 98 % | DIASTOLIC BLOOD PRESSURE: 68 MMHG | BODY MASS INDEX: 17.54 KG/M2 | RESPIRATION RATE: 17 BRPM | WEIGHT: 99 LBS | SYSTOLIC BLOOD PRESSURE: 108 MMHG

## 2022-05-03 DIAGNOSIS — R41.89 NEUROCOGNITIVE DEFICITS: Primary | ICD-10-CM

## 2022-05-03 DIAGNOSIS — R41.3 MEMORY CHANGES: ICD-10-CM

## 2022-05-03 DIAGNOSIS — R29.818 NEUROCOGNITIVE DEFICITS: Primary | ICD-10-CM

## 2022-05-03 PROCEDURE — 99214 OFFICE O/P EST MOD 30 MIN: CPT | Performed by: NURSE PRACTITIONER

## 2022-05-03 RX ORDER — MEMANTINE HYDROCHLORIDE 5 MG/1
5 TABLET ORAL NIGHTLY
Qty: 30 TABLET | Refills: 2 | Status: SHIPPED | OUTPATIENT
Start: 2022-05-03 | End: 2022-06-02 | Stop reason: SDUPTHER

## 2022-05-03 NOTE — PROGRESS NOTES
"  Neurology Progress Note      Chief Complaint:    Memory Changes    Subjective     Subjective:  Lavinia Aguilar is a 76 y.o. female who presents today for memory changes.  She is routinely followed by Dr. Praveen Leung MD for primary care.  She is present with her  today.  She continues to have worsened memory and Mr. Aguilar reports that she is even forgetting who he is.  Mrs. Aguilar does seem to be very defensive today regarding the mentioning that her memory is worsening.  She continues to feel that he is \"leaving her out\" of decisions and simply wanting to take over.  He notes that she continues to have severe forgetfulness with many different aspects of daily life such as forgetting people, forgetting to do tasks, and forgetting about appointments/activities.  She repeatedly mentions things from the past during our visit and mentions her hospitalization from June 2021 a few different times.  She also mentions a time where she fell just after having her stroke a few times.  He mentions that she does physically perform her activities well but the forgetfulness is worsening as days go on.  She has yet to have CT head performed or Speech Therapy evaluation for her memory.  Overnight oximetry has yet to be completed.  Mr Aguilar mentions that she does sleep very frequently and he is concerned for low oxygenation at night.  She has recently had another UTI which she is being treated by her PCP for.      Past Medical History:   Diagnosis Date   • Bone/cartilage disorder    • Hyperlipidemia    • Left rotator cuff tear    • PONV (postoperative nausea and vomiting)    • Stroke (HCC)    • TIA (transient ischemic attack)      Past Surgical History:   Procedure Laterality Date   • CARPAL TUNNEL RELEASE     • CERVICAL SPINE SURGERY     • COLONOSCOPY     • COLONOSCOPY  06/19/2013    internal hemorrhoid   • COLONOSCOPY N/A 7/11/2018    Procedure: COLONOSCOPY WITH ANESTHESIA;  Surgeon: Andrew Palm MD;  Location: "  [facility];  Service: Gastroenterology   • HAMMER TOE REPAIR     • HYSTERECTOMY     • INSERTION / REMOVAL CRANIAL DBS GENERATOR     • KNEE CARTILAGE SURGERY     • NASAL SEPTUM SURGERY     • RECTAL SURGERY      rectocele   • SHOULDER ROTATOR CUFF REPAIR Left 02/2018   • SHOULDER SURGERY Right    • TONSILLECTOMY     • TUBAL ABDOMINAL LIGATION       Family History   Problem Relation Age of Onset   • Colon polyps Mother    • Colon cancer Neg Hx      Social History     Tobacco Use   • Smoking status: Never Smoker   • Smokeless tobacco: Never Used   Substance Use Topics   • Alcohol use: No   • Drug use: No     Medications:  Current Outpatient Medications   Medication Sig Dispense Refill   • aspirin 81 MG chewable tablet Chew 1 tablet Daily. 30 tablet 5   • Calcium-Vitamin D 500-125 MG-UNIT tablet Take 1 tablet by mouth Daily.     • megestrol (MEGACE) 40 MG/ML suspension Take 10 mL by mouth Daily. 480 mL 1   • Multiple Vitamins-Minerals (MULTIVITAMIN ADULT PO) Take 1 tablet by mouth Daily.     • penicillin v potassium (VEETID) 500 MG tablet Take 1 tablet by mouth 2 (Two) Times a Day for 14 days. 28 tablet 0   • rosuvastatin (CRESTOR) 10 MG tablet Take 1 tablet by mouth Daily. 90 tablet 1   • memantine (NAMENDA) 5 MG tablet Take 1 tablet by mouth Every Night. 30 tablet 2     No current facility-administered medications for this visit.     Current outpatient and discharge medications have been reconciled for the patient.  Reviewed by: ISACC Almanzar      Allergies:    Patient has no known allergies.    Review of Systems:   Review of Systems   Neurological: Positive for memory problem and confusion. Negative for facial asymmetry, weakness and headache.   All other systems reviewed and are negative.    Objective      Vital Signs  Heart Rate:  [103] 103  Resp:  [17] 17  BP: (108)/(68) 108/68    Physical Exam:  Physical Exam  Vitals reviewed.   Constitutional:       Appearance: Normal appearance.   HENT:      Head:  Normocephalic.   Eyes:      Extraocular Movements: Extraocular movements intact.      Pupils: Pupils are equal, round, and reactive to light.   Cardiovascular:      Rate and Rhythm: Normal rate and regular rhythm.      Pulses: Normal pulses.   Pulmonary:      Effort: Pulmonary effort is normal.   Musculoskeletal:         General: Normal range of motion.      Cervical back: Normal range of motion and neck supple.   Skin:     General: Skin is warm and dry.      Capillary Refill: Capillary refill takes less than 2 seconds.   Neurological:      Gait: Gait is intact.   Psychiatric:         Mood and Affect: Mood normal.         Cognition and Memory: Memory is impaired. She exhibits impaired recent memory.       Neurologic Exam:  Mental Status:    -Awake. Alert. Oriented to person, place.   Disoriented to time.  Able to give month/day.  Unable to state year, president, and name of hospital.   -No word finding difficulties.  -No aphasia.  -No dysarthria.  -Follows simple commands.     CN II:  Full visual fields with confrontation.  Pupils equally reactive to light.  CN III, IV, VI:  Extraocular muscles function intact with no nystagmus.  CN V:  Facial sensory is symmetric.  CN VII:  Facial motor symmetric.  CN VIII:  Gross hearing intact bilaterally.  CN IX/X:  Palate elevates symmetrically.  CN XI:  Shoulder shrug symmetric.  CN XII:  Tongue is midline on protrusion.     Motor: (strength out of 5:  1= minimal movement, 2 = movement in plane of gravity, 3 = movement against gravity, 4 = movement against some resistance, 5 = full strength)     -5/5 in bilateral biceps, triceps, brachioradialis, wrist extensors and intrinsic muscles of the hand.    -5/5 in bilateral hip flexors, quadriceps, hamstrings, gastrocsoleus complex, anterior tibialis and extensor hallucis longus.       Deep Tendon Reflexes:  -Right              Biceps: 2+         Triceps: 2+      Brachioradialis: 2+              Patella: 2+       Ankle: 2+          Babinski:  negative  -Left              Biceps: 2+         Triceps: 2+      Brachioradialis: 2+              Patella: 2+       Ankle: 2+         Babinski:  negative    Tone (Modified Eric Scale):  No appreciable increase in tone or rigidity noted.     Sensory:  -Intact to light touch, pinprick BUE (C5-T1) and BLE (L2-S1).     Coordination:  -Finger to nose intact BUEs  -Heel to shin intact BLEs  -No ataxia     Gait  -No signs of ataxia  -ambulates unassisted       Results Review:    Lab Results   Component Value Date    GLUCOSE 148 (H) 04/14/2022    BUN 15 04/14/2022    CREATININE 1.23 (H) 04/14/2022    EGFRIFNONA 74 12/03/2021    EGFRIFAFRI 125 10/25/2021    BCR 12.2 04/14/2022    K 3.4 (L) 04/14/2022    CO2 32.2 (H) 04/14/2022    CALCIUM 9.6 04/14/2022    PROTENTOTREF 6.6 04/14/2022    ALBUMIN 4.30 04/14/2022    LABIL2 1.9 04/14/2022    AST 15 04/14/2022    ALT 14 04/14/2022     Lab Results   Component Value Date    WBC 7.83 04/14/2022    HGB 14.2 04/14/2022    HCT 43.6 04/14/2022    .2 (H) 04/14/2022     04/14/2022     Lab Results   Component Value Date    TSH 3.900 12/03/2021     Lab Results   Component Value Date    FOLATE >20.00 12/03/2021     Lab Results   Component Value Date    [id number] 1,069 (H) 12/03/2021      Latest Reference Range & Units 03/08/22 13:51   Color, UA Yellow, Straw  Yellow   Appearance, UA Clear  Turbid !   Specific Gravity, UA 1.005 - 1.030  1.022   PH, UA 5.0 - 8.0  8.0   Glucose Negative  Negative   Ketones, UA Negative  Negative   Blood, UA Negative  Negative   Nitrite, UA Negative  Positive !   Leukocytes, UA Negative  Small (1+) !   Protein, UA Negative  Negative   Bilirubin, UA Negative  Negative   Urobilinogen, UA 0.2 - 1.0 E.U./dL  1.0 E.U./dL   RBC, UA None Seen /HPF 3-5 !   WBC, UA None Seen /HPF 3-5 !   Bacteria, UA None Seen /HPF 4+ !   Squamous Epithelial Cells, UA None Seen, 0-2 /HPF 3-6 !   Hyaline Casts, UA None Seen /LPF 3-6   Methodology:  Automated  Microscopy         Assessment/Plan     Impression:  • Neurocognitive Decline  • TIA  • UTI    Plan:  • Continue with CT head.  This has yet to be completed and I really would like to evaluate for any other intercranial cause to her memory.  She cannot have MRI performed due to DBS being im place.     • Speech therapy evaluation will again be ordered.  I discussed the importance of this evaluation with her and her  today.      • Will order overnight pulse ox to ensure she is doing with her sleep as this has been reported in the past. Would like to check for hypoxia.      • Start Namenda 5mg nightly.      • Discussed safety precautions with the  and Mrs. Aguilar.  Discussed fall risk/prevention.     • She should continue to work with PCP regarding UTI.  I do believe that this is worsening some of her memory concerns.      At this time Mrs. Aguilar does continue to remain somewhat confused.  She feels as if she has very minimal memory problems.  However, her  does observe multiple different times where she is confused and does forget people.  She also has forgetfulness about activities and appointments in addition to forgetting to perform her daily activities.  He does endorse that she continues to have the ability to physically perform the activities as long as she is reminded to do them.  She does forget medications on a frequent basis.  I am concerned that we are dealing with a vascular dementia.  I would like to finish this workup and get formal memory evaluation with ST.  We will start Namenda today.      The patient and I have discussed the plan of care and she is in full agreement at this time.     Follow-Up:  Return in about 6 weeks (around 6/14/2022) for Memory.      Barney Burleson, ISACC  05/03/22  16:09 CDT

## 2022-05-09 ENCOUNTER — HOSPITAL ENCOUNTER (OUTPATIENT)
Dept: CT IMAGING | Facility: HOSPITAL | Age: 77
Discharge: HOME | End: 2022-05-09
Admitting: NURSE PRACTITIONER

## 2022-05-09 DIAGNOSIS — R41.3 MEMORY CHANGES: ICD-10-CM

## 2022-05-09 PROCEDURE — 70450 CT HEAD/BRAIN W/O DYE: CPT

## 2022-05-10 ENCOUNTER — OFFICE VISIT (OUTPATIENT)
Dept: PHYSICAL THERAPY | Facility: CLINIC | Age: 77
End: 2022-05-10

## 2022-05-10 DIAGNOSIS — R41.89 OTHER SYMPTOMS AND SIGNS INVOLVING COGNITIVE FUNCTIONS AND AWARENESS: Primary | ICD-10-CM

## 2022-05-10 PROCEDURE — 96125 COGNITIVE TEST BY HC PRO: CPT

## 2022-05-10 NOTE — PROGRESS NOTES
Speech/Language Pathology Initial Evaluation and Plan of Care    Patient: Lavinia Aguilar               : 1945  Visit Date: 5/10/2022  Referring practitioner: ISACC Almanzar  Date of Initial Visit: 5/10/2022  Patient seen for 1 sessions    Visit Diagnoses:    ICD-10-CM ICD-9-CM   1. Other symptoms and signs involving cognitive functions and awareness  R41.89 799.59     Past Medical History:   Diagnosis Date   • Bone/cartilage disorder    • Hyperlipidemia    • Left rotator cuff tear    • PONV (postoperative nausea and vomiting)    • Stroke (HCC)    • TIA (transient ischemic attack)      Past Surgical History:   Procedure Laterality Date   • CARPAL TUNNEL RELEASE     • CERVICAL SPINE SURGERY     • COLONOSCOPY     • COLONOSCOPY  2013    internal hemorrhoid   • COLONOSCOPY N/A 2018    Procedure: COLONOSCOPY WITH ANESTHESIA;  Surgeon: Andrew Palm MD;  Location: [facility];  Service: Gastroenterology   • HAMMER TOE REPAIR     • HYSTERECTOMY     • INSERTION / REMOVAL CRANIAL DBS GENERATOR     • KNEE CARTILAGE SURGERY     • NASAL SEPTUM SURGERY     • RECTAL SURGERY      rectocele   • SHOULDER ROTATOR CUFF REPAIR Left 2018   • SHOULDER SURGERY Right    • TONSILLECTOMY     • TUBAL ABDOMINAL LIGATION         SUBJECTIVE     Patient presents with the following symptoms: forgetfulness within ADLs such as forgetting people's names, forgetting to do tasks, and forgetting about appointments/activities. She also will tell stories of the past especially about falling and hurting her arm.   Date of Onset: cognitive changes after stroke in 2021 but have worsened in the last several months  History of present problems: stroke 2021  The functional impact on the patient: Patient is unable to complete ADLs independently due to cognitive deficits. She is unable to keep track of appointments, take medication, keep finances, and complete daily living tasks without assistance.    Prior level  of function/education: n/a  Pertinent Medical History Related to this Problem: Patient had a stroke in June of 2021.       OBJECTIVE     Patient was seen today for a memory evaluation. Patient did not exhibit adequate orientation/cognition for administration of the the RBANS memory assessment, therefore: the patient was given the [place] Elderly Assessment of Mental State (MEAMS), which assesses cognitive skills in the elderly. There are 12 areas as listed below with a pass/fail criterion for each area. The patient passed 3/12 subtests.      MEAMS Scores  Subtest Score Pass Score Pass/Fail   Orientation 3/5 5 fail   Naming 1/3 3 fail   Comprehension 2/3 3 fail   Arithmetic 3/3 3 pass   Spatial Construction 2/2 2 pass   Remembering Pictures 6/10 8 fail   Fragmented letter perception 4/4 3 pass   Unusual Views 1/3 2 fail   Usual Views 2/3 3 fail   Verbal Fluency 7/10 10 fail   Motor Perseveration 1/5 3 fail   Name Learning/Recall 2/4 2 pass   Total Score 4/12 10 fail     *Total Score Criterion: 10-12 Pass, 8-9 Borderline, 7 or below Refer for full Neuropsychological assessment.     Comments: Patient scored 4/12 on the MEAMS which places her in extremely low average for cognitive deficits. She had difficulty with orientation coming up with the date and her age, naming, comprehension, recall memory, verbal fluency, and motor perseveration.      IMPRESSION/RECOMMENDATIONS  Factors Affecting Performance: n/a  Learning Motivation: good  Education/Learning Comments: Patient's  demonstrated understanding of evaluation results and plan of care.   Clinical Impression   Impact on Function: Patient is unable to perform ADLs independently. She needs assistance to perform daily tasks due to forgetting how to perform tasks or forgetting how to complete the task while engaged in the activity.   Prognosis Comment: Prognosis is good due to strong support from the patient's  understanding how to provide strategies to  help his wife with her cognitive deficits.       Therapy Education/Self Care    Details: Memory evaluation and strategies   Given handouts on memory strategies   Progress: New   Education provided to:  Patient and Spouse/SO   Level of understanding Verbalized           Total Time of Visit:            60   mins    ASSESSMENT/PLAN     ASSESSMENT:   Patient is not indicated for skilled care by a Speech/Language Pathologist due to patient not being interested in therapy.     Summary of Assessment: Patient scored 4/12 on the MEAMS for baseline measures, which places her in extremely low average for cognitive deficits. She had difficulty with orientation coming up with the date and her age, naming, comprehension, recall memory, verbal fluency, and motor perseveration.      PLAN:  Details of Plan of Care: return to doctor for further instructions      SPEECH/LANGUAGE PATHOLOGIST SIGNATURE: Lorrie Soni, ASIF-SLP  [place] License #:[id number]    Thank you so much for letting us work with Lavinia. I appreciate your letting us work with your patients. If you have any questions or concerns, please don't hesitate to contact me.

## 2022-05-13 ENCOUNTER — TELEPHONE (OUTPATIENT)
Dept: NEUROLOGY | Facility: CLINIC | Age: 77
End: 2022-05-13

## 2022-05-13 NOTE — TELEPHONE ENCOUNTER
Caller: Mic Aguilar    Relationship: Emergency Contact    Best call back number: [phone]    Who are you requesting to speak with (clinical staff, provider,  specific staff member):   JOHANN    What was the call regarding:   PT'S  IS CALLING TO GET THE RESULTS OF THE PT'S MEMORY EVALUATION.     PT ALSO HAD CT SCAN AT [facility] 5-9-22 AND THEY ARE WANTING TO KNOW THE RESULTS OF THE MRI AS WELL.    Do you require a callback:   PLEASE CALL PT'S .

## 2022-05-16 ENCOUNTER — TELEPHONE (OUTPATIENT)
Dept: NEUROLOGY | Facility: CLINIC | Age: 77
End: 2022-05-16

## 2022-05-16 NOTE — TELEPHONE ENCOUNTER
----- Message from ISACC Almanzar sent at 5/16/2022  9:31 AM CDT -----  She scored extremely low indicating she has memory and cognitive loss.    ----- Message -----  From: Lyndsey Plata LPN  Sent: 5/16/2022   9:22 AM CDT  To: ISACC Almanzar    Mic would like the memory eval results.

## 2022-06-02 ENCOUNTER — OFFICE VISIT (OUTPATIENT)
Dept: NEUROLOGY | Facility: CLINIC | Age: 77
End: 2022-06-02

## 2022-06-02 VITALS
BODY MASS INDEX: 17.54 KG/M2 | SYSTOLIC BLOOD PRESSURE: 152 MMHG | WEIGHT: 99 LBS | RESPIRATION RATE: 17 BRPM | DIASTOLIC BLOOD PRESSURE: 90 MMHG | HEIGHT: 63 IN | OXYGEN SATURATION: 98 % | HEART RATE: 91 BPM

## 2022-06-02 DIAGNOSIS — F03.90 DEMENTIA WITHOUT BEHAVIORAL DISTURBANCE, UNSPECIFIED DEMENTIA TYPE: Primary | ICD-10-CM

## 2022-06-02 PROCEDURE — 99213 OFFICE O/P EST LOW 20 MIN: CPT | Performed by: NURSE PRACTITIONER

## 2022-06-02 RX ORDER — MEMANTINE HYDROCHLORIDE 5 MG/1
5 TABLET ORAL NIGHTLY
Qty: 30 TABLET | Refills: 2 | Status: SHIPPED | OUTPATIENT
Start: 2022-06-02 | End: 2022-12-13

## 2022-06-02 NOTE — PROGRESS NOTES
Neurology Progress Note      Chief Complaint:    Memory Changes    Subjective     Subjective:  Lavinia Aguilar is a 76 y.o. female who presents today for memory changes.  She is routinely followed by Dr. Praveen Leung MD for primary care.  She is present with her  today.  She has had no changes in her memory since last being seen.  Mrs. Aguilar continues to endorse that there are no issues with her memory but Mr. Aguilar continues to endorse that she has significantly impaired memory.  To the point that she doesn't remember him at times.  He endorses that she has difficulties with finding objects around the house.  She does continue to have the ability to dress herself, put on her makeup, eat by herself, and do some simple cleaning around the house.  She does have significant difficulties with cooking.  She has no difficulties with walking and has had no falls.  She did have memory testing performed with ST which did reveal impaired memory.  CT scan was also performed which revealed moderate atrophy and microvascular disease in the absence of acute changes.  Overnight pulse oximetry revealed no hypoxic events.  She has not been taking the Namenda that was prescribed at last visit.  They were unaware of needing to pick this medication up.  She does continue to have repeated UTI's which may be contributory to her memory loss.     Mr. Aguilar did ask to speak privately about his wife's condition.  He continues to endorse the fact that she is presenting with symptoms of dementia and is curious as to why we have not really been given a dementia diagnosis after being seen multiple times at this point.  I did explain to him that we had to ensure there were no other underlying process which could be contributory to her condition which included the CT scan and overnight pulse oximetry.  I also explained to him that the memory testing is also another tool to help identify the presence of true memory loss as well.   He states he is needing a letter stating that she is unable to care for herself independently to help with POA paperwork.      Past Medical History:   Diagnosis Date   • Bone/cartilage disorder    • Hyperlipidemia    • Left rotator cuff tear    • PONV (postoperative nausea and vomiting)    • Stroke (HCC)    • TIA (transient ischemic attack)      Past Surgical History:   Procedure Laterality Date   • CARPAL TUNNEL RELEASE     • CERVICAL SPINE SURGERY     • COLONOSCOPY     • COLONOSCOPY  06/19/2013    internal hemorrhoid   • COLONOSCOPY N/A 7/11/2018    Procedure: COLONOSCOPY WITH ANESTHESIA;  Surgeon: Andrew Palm MD;  Location: [facility] ENDOSCOPY;  Service: Gastroenterology   • HAMMER TOE REPAIR     • HYSTERECTOMY     • INSERTION / REMOVAL CRANIAL DBS GENERATOR     • KNEE CARTILAGE SURGERY     • NASAL SEPTUM SURGERY     • RECTAL SURGERY      rectocele   • SHOULDER ROTATOR CUFF REPAIR Left 02/2018   • SHOULDER SURGERY Right    • TONSILLECTOMY     • TUBAL ABDOMINAL LIGATION       Family History   Problem Relation Age of Onset   • Colon polyps Mother    • Colon cancer Neg Hx      Social History     Tobacco Use   • Smoking status: Never Smoker   • Smokeless tobacco: Never Used   Substance Use Topics   • Alcohol use: No   • Drug use: No     Medications:  Current Outpatient Medications   Medication Sig Dispense Refill   • aspirin 81 MG chewable tablet Chew 1 tablet Daily. 30 tablet 5   • Calcium-Vitamin D 500-125 MG-UNIT tablet Take 1 tablet by mouth Daily.     • megestrol (MEGACE) 40 MG/ML suspension Take 10 mL by mouth Daily. 480 mL 1   • memantine (NAMENDA) 5 MG tablet Take 1 tablet by mouth Every Night. 30 tablet 2   • Multiple Vitamins-Minerals (MULTIVITAMIN ADULT PO) Take 1 tablet by mouth Daily.     • rosuvastatin (CRESTOR) 10 MG tablet Take 1 tablet by mouth Daily. 90 tablet 1     No current facility-administered medications for this visit.     Current outpatient and discharge medications have been reconciled  for the patient.  Reviewed by: ISACC Almanzar      Allergies:    Patient has no known allergies.    Review of Systems:   Review of Systems   Neurological: Positive for memory problem and confusion. Negative for facial asymmetry, weakness and headache.   All other systems reviewed and are negative.    Objective      Vital Signs  Heart Rate:  [91] 91  Resp:  [17] 17  BP: (152)/(90) 152/90    Physical Exam:  Physical Exam  Vitals reviewed.   Constitutional:       Appearance: Normal appearance.   HENT:      Head: Normocephalic.   Eyes:      Extraocular Movements: Extraocular movements intact.      Pupils: Pupils are equal, round, and reactive to light.   Cardiovascular:      Rate and Rhythm: Normal rate and regular rhythm.      Pulses: Normal pulses.   Pulmonary:      Effort: Pulmonary effort is normal.   Musculoskeletal:         General: Normal range of motion.      Cervical back: Normal range of motion and neck supple.   Skin:     General: Skin is warm and dry.      Capillary Refill: Capillary refill takes less than 2 seconds.   Neurological:      Gait: Gait is intact.   Psychiatric:         Mood and Affect: Mood normal.         Cognition and Memory: Memory is impaired. She exhibits impaired recent memory.       Neurologic Exam:  Mental Status:    -Awake. Alert. Oriented to person.  Can state city and state.  Unable to state county. Unable to mention name of hospital.  Disoriented to time.  Able to give month/day.  Unable to state year, president, and name of hospital.   -No word finding difficulties.  -No aphasia.  -No dysarthria.  -Follows simple commands.  Does have some minor difficulties with multi-step commands.      CN II:  Full visual fields with confrontation.  Pupils equally reactive to light.  CN III, IV, VI:  Extraocular muscles function intact with no nystagmus.  CN V:  Facial sensory is symmetric.  CN VII:  Facial motor symmetric.  CN VIII:  Gross hearing intact bilaterally.  CN IX/X:  Palate  elevates symmetrically.  CN XI:  Shoulder shrug symmetric.  CN XII:  Tongue is midline on protrusion.     Motor: (strength out of 5:  1= minimal movement, 2 = movement in plane of gravity, 3 = movement against gravity, 4 = movement against some resistance, 5 = full strength)     -4+5 in bilateral biceps, triceps, brachioradialis, wrist extensors and intrinsic muscles of the hand.    -4+/5 in bilateral hip flexors, quadriceps, hamstrings, gastrocsoleus complex, anterior tibialis and extensor hallucis longus.       Deep Tendon Reflexes:  -Right              Biceps: 2+         Triceps: 2+      Brachioradialis: 2+              Patella: 2+       Ankle: 2+         Babinski:  negative  -Left              Biceps: 2+         Triceps: 2+      Brachioradialis: 2+              Patella: 2+       Ankle: 2+         Babinski:  negative    Tone (Modified Eric Scale):  No appreciable increase in tone or rigidity noted.     Sensory:  -Intact to light touch, pinprick BUE (C5-T1) and BLE (L2-S1).     Coordination:  -Finger to nose intact BUEs  -Heel to shin intact BLEs  -No ataxia     Gait  -No signs of ataxia  -ambulates unassisted       Results Review:    Lab Results   Component Value Date    GLUCOSE 148 (H) 04/14/2022    BUN 15 04/14/2022    CREATININE 1.23 (H) 04/14/2022    EGFRIFNONA 74 12/03/2021    EGFRIFAFRI 125 10/25/2021    BCR 12.2 04/14/2022    K 3.4 (L) 04/14/2022    CO2 32.2 (H) 04/14/2022    CALCIUM 9.6 04/14/2022    PROTENTOTREF 6.6 04/14/2022    ALBUMIN 4.30 04/14/2022    LABIL2 1.9 04/14/2022    AST 15 04/14/2022    ALT 14 04/14/2022     Lab Results   Component Value Date    WBC 7.83 04/14/2022    HGB 14.2 04/14/2022    HCT 43.6 04/14/2022    .2 (H) 04/14/2022     04/14/2022      Latest Reference Range & Units 04/14/22 13:08   Color, UA  Yellow [1]   Appearance, UA Clear  Clear   Specific Gravity, UA 1.005 - 1.030  Comment [2]   PH, UA 5.0 - 8.0  5.5   Glucose Negative  See below: ! [3]   Ketones, UA  Negative  Trace !   Blood, UA Negative  Negative   Nitrite, UA Negative  Negative   Leukocytes, UA Negative  Negative   Protein, UA Negative  Negative   Bilirubin, UA Negative  Negative   RBC, UA /HPF Comment [4]   WBC, UA /HPF Comment [5]   Bacteria, UA None Seen /HPF Comment [6]   Crystal Type  Comment [7]   Epithelial Cells (non renal) /HPF Comment [8]   Urobilinogen, UA  Comment [9]   URINE CULTURE  Rpt !   Susceptibility Testing  Comment [10]   Result 1  Enterococcus faecalis ! [11]     Office Visit with Lorrie Soni CCC-SLP (05/10/2022)    Assessment/Plan     Impression:  • Dementia  • TIA  • UTI    Plan:  • Start Namenda 5mg nightly.      • Discussed safety precautions with the  and Mrs. Aguilar.  Discussed fall risk/prevention.      • Supportive care regarding memory and functional abilities.  There is no identified therapy needs at this point which would be beneficial such as PT or OT.     • Only other testing to recommend would be EEG.  Will reach out to the patient and her  about consideration of this to further evaluate.      • Recommend safety precautions including fall risk/prevention.     • No driving.      • She should continue to work with PCP regarding UTI.  I do believe that this is worsening some of her memory concerns.  She does continue to have repeated UTI's at this point.     I do believe that at this point Mrs. Aguilar's symptoms are representative of dementia.  She does have difficulties with living independently and I do believe that she should not be living alone at this point.  I do believe she should have oversight with her daily activities to ensure safety.  I also believe that she should have assistance with her finances and other business affairs.    The patient and I have discussed the plan of care and she is in full agreement at this time.     Follow-Up:  Return in about 3 months (around 9/2/2022) for Memory.      Barney Burleson, ISACC  06/02/22  11:41 CDT

## 2022-06-14 ENCOUNTER — TELEPHONE (OUTPATIENT)
Dept: NEUROLOGY | Facility: CLINIC | Age: 77
End: 2022-06-14

## 2022-06-14 NOTE — TELEPHONE ENCOUNTER
I spoke to Mr. Bragg this morning and he states he is in the hospital with CHF. He has family with Mrs. Bragg and wishes not to order any further tests at this time. I advised him to please call if there is anything our office can help with. He understood and will call if needed. DEVAN

## 2022-08-08 ENCOUNTER — OFFICE VISIT (OUTPATIENT)
Dept: NEUROLOGY | Facility: CLINIC | Age: 77
End: 2022-08-08

## 2022-08-08 VITALS
HEART RATE: 84 BPM | BODY MASS INDEX: 17.54 KG/M2 | HEIGHT: 63 IN | SYSTOLIC BLOOD PRESSURE: 138 MMHG | RESPIRATION RATE: 17 BRPM | WEIGHT: 99 LBS | DIASTOLIC BLOOD PRESSURE: 78 MMHG | OXYGEN SATURATION: 99 %

## 2022-08-08 DIAGNOSIS — G45.9 TIA (TRANSIENT ISCHEMIC ATTACK): ICD-10-CM

## 2022-08-08 DIAGNOSIS — Z96.89 S/P DEEP BRAIN STIMULATOR PLACEMENT: ICD-10-CM

## 2022-08-08 DIAGNOSIS — F03.90 DEMENTIA WITHOUT BEHAVIORAL DISTURBANCE, UNSPECIFIED DEMENTIA TYPE: Primary | ICD-10-CM

## 2022-08-08 PROCEDURE — 99213 OFFICE O/P EST LOW 20 MIN: CPT | Performed by: NURSE PRACTITIONER

## 2022-08-08 NOTE — PROGRESS NOTES
Neurology Progress Note      Chief Complaint:    Dementia  Stroke    Subjective     Subjective:  Lavinia Aguilar is a 77 y.o. female who presents today for memory changes.  She is routinely followed by Dr. Praveen Leung MD for primary care.  She is present with her  today.  She does have a DBS in place which is managed by [facility].  They have not seen them in around a year at this point.     Dementia  She continues with memory loss.  No changes since last being seen and noted to be stable.  She continues to have good days and bad days.  She does need cues to remember to perform some activities but otherwise can continue to perform activities with minimal assistance.  She does seem to get fixated on ideas at times and does misspeak about past events.  She continues Namenda 5mg daily.     Stroke  No new stroke symptoms.  She continues to take aspirin 81mg daily and crestor 10mg daily.  She is tolerating these well.  She has had no bleeding episodes or myalgias.  She is ambulating well with no falls. No word finding noted.  BP under control with it being 138/78 today in office.     Past Medical History:   Diagnosis Date   • Bone/cartilage disorder    • Hyperlipidemia    • Left rotator cuff tear    • PONV (postoperative nausea and vomiting)    • Stroke (HCC)    • TIA (transient ischemic attack)      Past Surgical History:   Procedure Laterality Date   • CARPAL TUNNEL RELEASE     • CERVICAL SPINE SURGERY     • COLONOSCOPY     • COLONOSCOPY  06/19/2013    internal hemorrhoid   • COLONOSCOPY N/A 7/11/2018    Procedure: COLONOSCOPY WITH ANESTHESIA;  Surgeon: Andrew Palm MD;  Location: [facility] ENDOSCOPY;  Service: Gastroenterology   • HAMMER TOE REPAIR     • HYSTERECTOMY     • INSERTION / REMOVAL CRANIAL DBS GENERATOR     • KNEE CARTILAGE SURGERY     • NASAL SEPTUM SURGERY     • RECTAL SURGERY      rectocele   • SHOULDER ROTATOR CUFF REPAIR Left 02/2018   • SHOULDER SURGERY Right    • TONSILLECTOMY     •  TUBAL ABDOMINAL LIGATION       Family History   Problem Relation Age of Onset   • Colon polyps Mother    • Colon cancer Neg Hx      Social History     Tobacco Use   • Smoking status: Never Smoker   • Smokeless tobacco: Never Used   Substance Use Topics   • Alcohol use: No   • Drug use: No     Medications:  Current Outpatient Medications   Medication Sig Dispense Refill   • aspirin 81 MG chewable tablet Chew 1 tablet Daily. 30 tablet 5   • Calcium-Vitamin D 500-125 MG-UNIT tablet Take 1 tablet by mouth Daily.     • megestrol (MEGACE) 40 MG/ML suspension Take 10 mL by mouth Daily. 480 mL 1   • memantine (NAMENDA) 5 MG tablet Take 1 tablet by mouth Every Night. 30 tablet 2   • Multiple Vitamins-Minerals (MULTIVITAMIN ADULT PO) Take 1 tablet by mouth Daily.     • rosuvastatin (CRESTOR) 10 MG tablet Take 1 tablet by mouth Daily. 90 tablet 1     No current facility-administered medications for this visit.     Current outpatient and discharge medications have been reconciled for the patient.  Reviewed by: ISACC Almanzar      Allergies:    Patient has no known allergies.    Review of Systems:   Review of Systems   Neurological: Positive for memory problem and confusion. Negative for facial asymmetry, weakness and headache.   All other systems reviewed and are negative.    Objective      Vital Signs  Temp:  [98.5 °F (36.9 °C)] 98.5 °F (36.9 °C)  Heart Rate:  [74] 74  Resp:  [16] 16  BP: (118)/(62) 118/62    Physical Exam:  Physical Exam  Vitals reviewed.   Constitutional:       Appearance: Normal appearance.   HENT:      Head: Normocephalic.   Eyes:      Extraocular Movements: Extraocular movements intact.      Pupils: Pupils are equal, round, and reactive to light.   Cardiovascular:      Rate and Rhythm: Normal rate and regular rhythm.      Pulses: Normal pulses.   Pulmonary:      Effort: Pulmonary effort is normal.   Musculoskeletal:         General: Normal range of motion.      Cervical back: Normal range of motion  and neck supple.   Skin:     General: Skin is warm and dry.      Capillary Refill: Capillary refill takes less than 2 seconds.   Neurological:      Gait: Gait is intact.   Psychiatric:         Mood and Affect: Mood normal.         Cognition and Memory: Memory is impaired. She exhibits impaired recent memory.       Neurologic Exam:  Mental Status:    -Awake. Alert. Oriented to person.  Can state city and state.  Unable to state county. Unable to mention name of hospital.  Disoriented to time.  Able to give the year but not the month or day.  Unable to state president, and name of hospital.   -No word finding difficulties.  -No aphasia.  -No dysarthria.  -Follows simple commands.  Does have some minor difficulties with multi-step commands.      CN II:  Full visual fields with confrontation.  Pupils equally reactive to light.  CN III, IV, VI:  Extraocular muscles function intact with no nystagmus.  CN V:  Facial sensory is symmetric.  CN VII:  Facial motor symmetric.  CN VIII:  Gross hearing intact bilaterally.  CN IX/X:  Palate elevates symmetrically.  CN XI:  Shoulder shrug symmetric.  CN XII:  Tongue is midline on protrusion.     Motor: (strength out of 5:  1= minimal movement, 2 = movement in plane of gravity, 3 = movement against gravity, 4 = movement against some resistance, 5 = full strength)     -4+5 in bilateral biceps, triceps, brachioradialis, wrist extensors and intrinsic muscles of the hand.    -4+/5 in bilateral hip flexors, quadriceps, hamstrings, gastrocsoleus complex, anterior tibialis and extensor hallucis longus.       Deep Tendon Reflexes:  -Right              Biceps: 2+         Triceps: 2+      Brachioradialis: 2+              Patella: 2+       Ankle: 2+         Babinski:  negative  -Left              Biceps: 2+         Triceps: 2+      Brachioradialis: 2+              Patella: 2+       Ankle: 2+         Babinski:  negative    Tone (Modified Eric Scale):  No appreciable increase in tone or  rigidity noted.     Sensory:  -Intact to light touch, pinprick BUE (C5-T1) and BLE (L2-S1).     Coordination:  -Finger to nose intact BUEs  -Heel to shin intact BLEs  -No ataxia     Gait  -No signs of ataxia  -ambulates unassisted       Results Review:    Lab Results   Component Value Date    GLUCOSE 148 (H) 04/14/2022    BUN 15 04/14/2022    CREATININE 1.23 (H) 04/14/2022    EGFRIFNONA 74 12/03/2021    EGFRIFAFRI 125 10/25/2021    BCR 12.2 04/14/2022    K 3.4 (L) 04/14/2022    CO2 32.2 (H) 04/14/2022    CALCIUM 9.6 04/14/2022    PROTENTOTREF 6.6 04/14/2022    ALBUMIN 4.30 04/14/2022    LABIL2 1.9 04/14/2022    AST 15 04/14/2022    ALT 14 04/14/2022     Lab Results   Component Value Date    WBC 7.83 04/14/2022    HGB 14.2 04/14/2022    HCT 43.6 04/14/2022    .2 (H) 04/14/2022     04/14/2022     Office Visit with Lorrie Soni CCC-SLP (05/10/2022)    Assessment/Plan     Impression:  • Dementia  • History of Stroke    Plan:  • Continue Namenda 5mg nightly.   • Discussed safety precautions and fall risk/prevention.   • Supportive care regarding memory and functional abilities.    • There is no identified therapy needs at this point which would be beneficial such as PT or OT. He is to notify me if there are any changes.  • Recommend safety precautions including fall risk/prevention.  • No driving.   • Secondary stroke prevention with aspirin, BP monitoring, and Crestor.    • Return to ED with any stroke-like symptoms  • Recommend they call [facility] to ensure proper follow-up of DBS.     The patient and I have discussed the plan of care and she is in full agreement at this time.     Follow-Up:  Return in about 6 months (around 2/8/2023) for Memory, Stroke.      ISACC Almanzar  08/10/22  12:43 CDT

## 2022-08-10 ENCOUNTER — OFFICE VISIT (OUTPATIENT)
Dept: FAMILY MEDICINE CLINIC | Facility: CLINIC | Age: 77
End: 2022-08-10

## 2022-08-10 VITALS
RESPIRATION RATE: 16 BRPM | HEIGHT: 63 IN | TEMPERATURE: 98.5 F | WEIGHT: 95 LBS | BODY MASS INDEX: 16.83 KG/M2 | HEART RATE: 74 BPM | DIASTOLIC BLOOD PRESSURE: 62 MMHG | SYSTOLIC BLOOD PRESSURE: 118 MMHG

## 2022-08-10 DIAGNOSIS — M79.675 PAIN OF TOE OF LEFT FOOT: Primary | ICD-10-CM

## 2022-08-10 PROCEDURE — 99213 OFFICE O/P EST LOW 20 MIN: CPT | Performed by: FAMILY MEDICINE

## 2022-08-10 NOTE — PROGRESS NOTES
Subjective   Lavinia Aguilar is a 77 y.o. female.     Chief Complaint   Patient presents with   • Toe Pain     Foot issues      History of Present Illness     she is noting deromity of the left foot with thickened toenails and deformity of the toenails      Current Outpatient Medications:   •  aspirin 81 MG chewable tablet, Chew 1 tablet Daily., Disp: 30 tablet, Rfl: 5  •  Calcium-Vitamin D 500-125 MG-UNIT tablet, Take 1 tablet by mouth Daily., Disp: , Rfl:   •  megestrol (MEGACE) 40 MG/ML suspension, Take 10 mL by mouth Daily., Disp: 480 mL, Rfl: 1  •  memantine (NAMENDA) 5 MG tablet, Take 1 tablet by mouth Every Night., Disp: 30 tablet, Rfl: 2  •  Multiple Vitamins-Minerals (MULTIVITAMIN ADULT PO), Take 1 tablet by mouth Daily., Disp: , Rfl:   •  rosuvastatin (CRESTOR) 10 MG tablet, Take 1 tablet by mouth Daily., Disp: 90 tablet, Rfl: 1  No Known Allergies    BMI is below normal parameters (malnutrition). Recommendations: treating the underlying disease process      Past Medical History:   Diagnosis Date   • Bone/cartilage disorder    • Hyperlipidemia    • Left rotator cuff tear    • PONV (postoperative nausea and vomiting)    • Stroke (HCC)    • TIA (transient ischemic attack)      Past Surgical History:   Procedure Laterality Date   • CARPAL TUNNEL RELEASE     • CERVICAL SPINE SURGERY     • COLONOSCOPY     • COLONOSCOPY  06/19/2013    internal hemorrhoid   • COLONOSCOPY N/A 7/11/2018    Procedure: COLONOSCOPY WITH ANESTHESIA;  Surgeon: Andrew Palm MD;  Location: [facility] ENDOSCOPY;  Service: Gastroenterology   • HAMMER TOE REPAIR     • HYSTERECTOMY     • INSERTION / REMOVAL CRANIAL DBS GENERATOR     • KNEE CARTILAGE SURGERY     • NASAL SEPTUM SURGERY     • RECTAL SURGERY      rectocele   • SHOULDER ROTATOR CUFF REPAIR Left 02/2018   • SHOULDER SURGERY Right    • TONSILLECTOMY     • TUBAL ABDOMINAL LIGATION         Review of Systems   Constitutional: Negative.    HENT: Negative.    Eyes: Negative.   "  Respiratory: Negative.    Cardiovascular: Negative.    Gastrointestinal: Negative.    Endocrine: Negative.    Genitourinary: Negative.    Musculoskeletal: Positive for arthralgias and gait problem.   Skin: Positive for color change.   Allergic/Immunologic: Negative.    Hematological: Negative.    Psychiatric/Behavioral: Negative.        Objective  /62   Pulse 74   Temp 98.5 °F (36.9 °C)   Resp 16   Ht 160 cm (62.99\")   Wt 43.1 kg (95 lb)   BMI 16.83 kg/m²   Physical Exam  Vitals and nursing note reviewed.   Constitutional:       Appearance: Normal appearance. She is normal weight.   HENT:      Head: Normocephalic and atraumatic.      Nose: Nose normal.      Mouth/Throat:      Mouth: Mucous membranes are moist.   Eyes:      Pupils: Pupils are equal, round, and reactive to light.   Cardiovascular:      Rate and Rhythm: Normal rate and regular rhythm.      Pulses: Normal pulses.      Heart sounds: Normal heart sounds.   Pulmonary:      Effort: Pulmonary effort is normal.      Breath sounds: Normal breath sounds.   Abdominal:      General: Abdomen is flat. Bowel sounds are normal.      Palpations: Abdomen is soft.   Musculoskeletal:         General: Tenderness present.      Cervical back: Normal range of motion and neck supple.   Skin:     General: Skin is warm and dry.      Capillary Refill: Capillary refill takes less than 2 seconds.   Neurological:      Mental Status: She is alert. Mental status is at baseline.   Psychiatric:         Mood and Affect: Mood normal.     exam does confirm thickened yellow toenails with pressure ulcer on the adjacent toe(2nd toe)    Assessment & Plan   Diagnoses and all orders for this visit:    1. Pain of toe of left foot (Primary)  -     Ambulatory Referral to Podiatry                 Orders Placed This Encounter   Procedures   • Ambulatory Referral to Podiatry     Referral Priority:   Routine     Referral Type:   Consultation     Referral Reason:   Specialty Services " Required     Referred to Provider:   Praveen Escudero APRN     Requested Specialty:   Podiatry     Number of Visits Requested:   1       Follow up: 4 month(s)

## 2022-09-13 ENCOUNTER — TELEPHONE (OUTPATIENT)
Dept: FAMILY MEDICINE CLINIC | Facility: CLINIC | Age: 77
End: 2022-09-13

## 2022-09-13 NOTE — TELEPHONE ENCOUNTER
Caller: Alexander Aguilar    Relationship: Emergency Contact    Best call back number:[phone]    Who are you requesting to speak with     CLINICAL STAFF    Do you know the name of the person who called:     ALEXANDER    What was the call regarding:     NEEDING PAPERWORK FROM DR. KRAUS ABOUT PATIENT'S INCOMPASITY TO GET PAPERWORK GOING FOR [employer]., PATIENT HAS BEEN DIAGNOSED WITH DEMENTIA AND IS LOST IN HER OWN KITCHEN NOW    Do you require a callback:     YES, CALL BACK

## 2022-09-14 ENCOUNTER — DOCUMENTATION (OUTPATIENT)
Dept: FAMILY MEDICINE CLINIC | Facility: CLINIC | Age: 77
End: 2022-09-14

## 2022-09-14 NOTE — TELEPHONE ENCOUNTER
Due to the patients dementia she is not capable ot making meaningful decisions regarding her health care or financial decisions.

## 2022-09-14 NOTE — TELEPHONE ENCOUNTER
Family is needing a letter stating that with pt having dementia she is not able to make her own decisions any longer or can not take care of herself any longer

## 2022-09-21 NOTE — PROGRESS NOTES
[facility] - PODIATRY    Today's Date: 09/22/2022     Patient Name: Lavinia Aguilar  MRN: [id number]  CSN: [id number]  PCP: Praveen Leung MD  Referring Provider: Praveen Leung, *    SUBJECTIVE     Chief Complaint   Patient presents with   • Follow-up     Praveen Leung 05/24/2022 Pain of toe of left foot- pt states that is here today due to her toenails being long and thick and a painful callus in between her left great toe and second toe. Pain @ 10. Patient present with long thick toenails. Callus on the side of second toe of left foot.      HPI: Lavinia Aguilar, a 77 y.o.female, comes to clinic as a(n) new patient complaining of painful toenails and complaining of Thickened, irregular toenails. Patient has h/o Hyperlipidemia, CVA, TIA. Patient presents with complaints of thickened, dystrophic, irregular toenails with tenderness around several of the nails of both feet.  Patient reports that her second and third toe nails are thickened and turned towards the midline.  She states that due to the thickness of the nail she is unable to care for them herself. Admits pain at 10/10 level and described as aching and nagging. Denies previous treatment. Denies any constitutional symptoms. No other pedal complaints at this time.    Past Medical History:   Diagnosis Date   • Bone/cartilage disorder    • Hyperlipidemia    • Left rotator cuff tear    • PONV (postoperative nausea and vomiting)    • Stroke (HCC)    • TIA (transient ischemic attack)      Past Surgical History:   Procedure Laterality Date   • CARPAL TUNNEL RELEASE     • CERVICAL SPINE SURGERY     • COLONOSCOPY     • COLONOSCOPY  06/19/2013    internal hemorrhoid   • COLONOSCOPY N/A 7/11/2018    Procedure: COLONOSCOPY WITH ANESTHESIA;  Surgeon: Andrew Palm MD;  Location: [facility] ENDOSCOPY;  Service: Gastroenterology   • HAMMER TOE REPAIR     • HYSTERECTOMY     • INSERTION / REMOVAL CRANIAL DBS GENERATOR     • KNEE  CARTILAGE SURGERY     • NASAL SEPTUM SURGERY     • RECTAL SURGERY      rectocele   • SHOULDER ROTATOR CUFF REPAIR Left 02/2018   • SHOULDER SURGERY Right    • TONSILLECTOMY     • TUBAL ABDOMINAL LIGATION       Family History   Problem Relation Age of Onset   • Colon polyps Mother    • Colon cancer Neg Hx      Social History     Socioeconomic History   • Marital status:    Tobacco Use   • Smoking status: Never Smoker   • Smokeless tobacco: Never Used   Substance and Sexual Activity   • Alcohol use: No   • Drug use: No   • Sexual activity: Defer     No Known Allergies  Current Outpatient Medications   Medication Sig Dispense Refill   • aspirin 81 MG chewable tablet Chew 1 tablet Daily. 30 tablet 5   • Calcium-Vitamin D 500-125 MG-UNIT tablet Take 1 tablet by mouth Daily.     • megestrol (MEGACE) 40 MG/ML suspension Take 10 mL by mouth Daily. 480 mL 1   • memantine (NAMENDA) 5 MG tablet Take 1 tablet by mouth Every Night. 30 tablet 2   • Multiple Vitamins-Minerals (MULTIVITAMIN ADULT PO) Take 1 tablet by mouth Daily.     • rosuvastatin (CRESTOR) 10 MG tablet Take 1 tablet by mouth Daily. 90 tablet 1     No current facility-administered medications for this visit.     Review of Systems   Constitutional: Negative for chills and fever.   HENT: Negative for congestion.    Respiratory: Negative for shortness of breath.    Cardiovascular: Negative for chest pain and leg swelling.   Gastrointestinal: Negative for constipation, diarrhea, nausea and vomiting.   Musculoskeletal: Positive for arthralgias. Negative for myalgias.   Skin: Negative for wound.   Neurological: Negative for numbness.   Psychiatric/Behavioral: Positive for confusion.       OBJECTIVE     Vitals:    09/22/22 1354   BP: 128/70   Pulse: 96   SpO2: 98%       PHYSICAL EXAM  GEN:   Accompanied by spouse.     Foot/Ankle Exam:       General:   Appearance: appears stated age and healthy    Orientation: disoriented    Orientation comment:  Mild  confusion  Affect: appropriate    Gait: unimpaired    Assistance: independent    Shoe Gear:  Casual shoes    VASCULAR      Right Foot Vascularity   Dorsalis pedis:  2+  Posterior tibial:  2+  Skin Temperature: warm    Edema Grading:  None  CFT:  3  Pedal Hair Growth:  Present  Varicosities: none       Left Foot Vascularity   Dorsalis pedis:  2+  Posterior tibial:  2+  Skin Temperature: warm    Edema Grading:  None  CFT:  3  Pedal Hair Growth:  Present  Varicosities: none        NEUROLOGIC     Right Foot Neurologic   Normal sensation    Light touch sensation:  Normal  Vibratory sensation:  Normal  Hot/Cold sensation: normal    Protective Sensation using [place] Monofilament:  10     Left Foot Neurologic   Normal sensation    Light touch sensation:  Normal  Vibratory sensation:  Normal  Hot/cold sensation: normal    Protective Sensation using [place] Monofilament:  10     MUSCULOSKELETAL      Right Foot Musculoskeletal   Ecchymosis:  None  Tenderness: toenails    Arch:  Normal  Hallux valgus: No       Left Foot Musculoskeletal   Ecchymosis:  None  Tenderness: toenails    Arch:  Normal  Hallux valgus: No       MUSCLE STRENGTH     Right Foot Muscle Strength   Foot dorsiflexion:  4+  Foot plantar flexion:  4+  Foot inversion:  4+  Foot eversion:  4+     Left Foot Muscle Strength   Foot dorsiflexion:  4+  Foot plantar flexion:  4+  Foot inversion:  4+  Foot eversion:  4+     RANGE OF MOTION      Right Foot Range of Motion   Foot and ankle ROM within normal limits       Left Foot Range of Motion   Foot and ankle ROM within normal limits       DERMATOLOGIC     Right Foot Dermatologic   Skin: skin intact    Nails: onychomycosis, abnormally thick, subungual debris and dystrophic nails       Left Foot Dermatologic   Skin: skin intact    Nails: onychomycosis, abnormally thick, subungual debris and dystrophic nails        RADIOLOGY/NUCLEAR:  No results found.    LABORATORY/CULTURE RESULTS:      PATHOLOGY  RESULTS:       ASSESSMENT/PLAN     Diagnoses and all orders for this visit:    1. Onychogryphosis (Primary)    2. Pain around toenail      Comprehensive lower extremity examination and evaluation was performed.  Discussed findings and treatment plan including risks, benefits, and treatment options with patient in detail. Patient agreed with treatment plan.  After verbal consent obtained, nail(s) x10 debrided of length and thickness with nail nipper without incidence  Reviewed at home diabetic foot care including daily foot checks   An After Visit Summary was printed and given to the patient at discharge, including (if requested) any available informative/educational handouts regarding diagnosis, treatment, or medications. All questions were answered to patient/family satisfaction. Should symptoms fail to improve or worsen they agree to call or return to clinic or to go to the Emergency Department. Discussed the importance of following up with any needed screening tests/labs/specialist appointments and any requested follow-up recommended by me today. Importance of maintaining follow-up discussed and patient accepts that missed appointments can delay diagnosis and potentially lead to worsening of conditions.  Return if symptoms worsen or fail to improve., or sooner if acute issues arise.    Lab Frequency Next Occurrence   DEXA Bone Density Axial Once 10/30/2021   Overnight Sleep Oximetry Study Once 05/08/2022       This document has been electronically signed by ISACC Quiroz on September 22, 2022 16:44 CDT

## 2022-09-22 ENCOUNTER — OFFICE VISIT (OUTPATIENT)
Dept: PODIATRY | Facility: CLINIC | Age: 77
End: 2022-09-22

## 2022-09-22 VITALS
HEIGHT: 63 IN | SYSTOLIC BLOOD PRESSURE: 128 MMHG | WEIGHT: 93.6 LBS | BODY MASS INDEX: 16.59 KG/M2 | DIASTOLIC BLOOD PRESSURE: 70 MMHG | OXYGEN SATURATION: 98 % | HEART RATE: 96 BPM

## 2022-09-22 DIAGNOSIS — M79.676 PAIN AROUND TOENAIL: ICD-10-CM

## 2022-09-22 DIAGNOSIS — L60.2 ONYCHOGRYPHOSIS: Primary | ICD-10-CM

## 2022-09-22 PROCEDURE — 11721 DEBRIDE NAIL 6 OR MORE: CPT | Performed by: NURSE PRACTITIONER

## 2022-09-22 PROCEDURE — 99213 OFFICE O/P EST LOW 20 MIN: CPT | Performed by: NURSE PRACTITIONER

## 2022-12-13 ENCOUNTER — OFFICE VISIT (OUTPATIENT)
Dept: FAMILY MEDICINE CLINIC | Facility: CLINIC | Age: 77
End: 2022-12-13

## 2022-12-13 VITALS
BODY MASS INDEX: 16.3 KG/M2 | WEIGHT: 92 LBS | SYSTOLIC BLOOD PRESSURE: 122 MMHG | HEART RATE: 72 BPM | OXYGEN SATURATION: 98 % | DIASTOLIC BLOOD PRESSURE: 68 MMHG | HEIGHT: 63 IN

## 2022-12-13 DIAGNOSIS — E78.2 MIXED HYPERLIPIDEMIA: Primary | ICD-10-CM

## 2022-12-13 PROCEDURE — 1160F RVW MEDS BY RX/DR IN RCRD: CPT | Performed by: FAMILY MEDICINE

## 2022-12-13 PROCEDURE — G0439 PPPS, SUBSEQ VISIT: HCPCS | Performed by: FAMILY MEDICINE

## 2022-12-13 PROCEDURE — 1170F FXNL STATUS ASSESSED: CPT | Performed by: FAMILY MEDICINE

## 2022-12-13 NOTE — PROGRESS NOTES
Subjective   Lavinia Aguilar is a 77 y.o. female.     Chief Complaint   Patient presents with   • Medicare Wellness-subsequent   • Hyperlipidemia        History of Present Illness     here today wiht her ---she is toelraing crestor without myagis       Current Outpatient Medications:   •  aspirin 81 MG chewable tablet, Chew 1 tablet Daily., Disp: 30 tablet, Rfl: 5  •  Calcium-Vitamin D 500-125 MG-UNIT tablet, Take 1 tablet by mouth Daily., Disp: , Rfl:   •  megestrol (MEGACE) 40 MG/ML suspension, Take 10 mL by mouth Daily., Disp: 480 mL, Rfl: 1  •  Multiple Vitamins-Minerals (MULTIVITAMIN ADULT PO), Take 1 tablet by mouth Daily., Disp: , Rfl:   •  memantine (NAMENDA) 5 MG tablet, Take 1 tablet by mouth Every Night., Disp: 30 tablet, Rfl: 2  •  rosuvastatin (CRESTOR) 10 MG tablet, Take 1 tablet by mouth Daily., Disp: 90 tablet, Rfl: 1  No Known Allergies    BMI is below normal parameters (malnutrition). Recommendations: none (medical contraindication)      Past Medical History:   Diagnosis Date   • Bone/cartilage disorder    • Hyperlipidemia    • Left rotator cuff tear    • PONV (postoperative nausea and vomiting)    • Stroke (HCC)    • TIA (transient ischemic attack)      Past Surgical History:   Procedure Laterality Date   • CARPAL TUNNEL RELEASE     • CERVICAL SPINE SURGERY     • COLONOSCOPY     • COLONOSCOPY  06/19/2013    internal hemorrhoid   • COLONOSCOPY N/A 7/11/2018    Procedure: COLONOSCOPY WITH ANESTHESIA;  Surgeon: Andrew Palm MD;  Location: [facility] ENDOSCOPY;  Service: Gastroenterology   • HAMMER TOE REPAIR     • HYSTERECTOMY     • INSERTION / REMOVAL CRANIAL DBS GENERATOR     • KNEE CARTILAGE SURGERY     • NASAL SEPTUM SURGERY     • RECTAL SURGERY      rectocele   • SHOULDER ROTATOR CUFF REPAIR Left 02/2018   • SHOULDER SURGERY Right    • TONSILLECTOMY     • TUBAL ABDOMINAL LIGATION         Review of Systems   Constitutional: Negative.    HENT: Negative.    Eyes: Negative.    Respiratory:  "Negative.    Cardiovascular: Negative.    Gastrointestinal: Negative.    Endocrine: Negative.    Genitourinary: Negative.    Musculoskeletal: Negative.    Skin: Negative.    Allergic/Immunologic: Negative.    Neurological: Negative.    Hematological: Negative.    Psychiatric/Behavioral: Negative.        Objective  /68   Pulse 72   Ht 160 cm (63\")   Wt 41.7 kg (92 lb)   SpO2 98%   BMI 16.30 kg/m²   Physical Exam  Vitals and nursing note reviewed.   Constitutional:       Appearance: Normal appearance. She is normal weight.   HENT:      Head: Normocephalic and atraumatic.      Nose: Nose normal.      Mouth/Throat:      Mouth: Mucous membranes are moist.   Eyes:      Extraocular Movements: Extraocular movements intact.      Conjunctiva/sclera: Conjunctivae normal.      Pupils: Pupils are equal, round, and reactive to light.   Cardiovascular:      Rate and Rhythm: Normal rate and regular rhythm.      Pulses: Normal pulses.      Heart sounds: Normal heart sounds.   Pulmonary:      Effort: Pulmonary effort is normal.      Breath sounds: Normal breath sounds.   Abdominal:      General: Abdomen is flat. Bowel sounds are normal.      Palpations: Abdomen is soft.   Musculoskeletal:         General: Normal range of motion.      Cervical back: Normal range of motion and neck supple.   Skin:     General: Skin is warm and dry.      Capillary Refill: Capillary refill takes less than 2 seconds.   Neurological:      General: No focal deficit present.      Mental Status: She is alert and oriented to person, place, and time. Mental status is at baseline.   Psychiatric:         Mood and Affect: Mood normal.         Assessment & Plan   Diagnoses and all orders for this visit:    1. Mixed hyperlipidemia (Primary)  -     CBC & Differential  -     Comprehensive metabolic panel  -     Lipid Panel With / Chol / HDL Ratio                 Orders Placed This Encounter   Procedures   • Comprehensive metabolic panel     Order Specific " Question:   Release to patient     Answer:   Routine Release   • Lipid Panel With / Chol / HDL Ratio     Order Specific Question:   Release to patient     Answer:   Routine Release   • CBC & Differential       Follow up: 6 month(s)

## 2022-12-13 NOTE — PROGRESS NOTES
The ABCs of the Annual Wellness Visit  Subsequent Medicare Wellness Visit    Subjective      Lavinia Aguilar is a 77 y.o. female who presents for a Subsequent Medicare Wellness Visit.    The following portions of the patient's history were reviewed and   updated as appropriate: allergies, current medications, past family history, past medical history, past social history, past surgical history and problem list.    Compared to one year ago, the patient feels her physical   health is the same.    Compared to one year ago, the patient feels her mental   health is the same.    Recent Hospitalizations:  She was not admitted to the hospital during the last year.       Current Medical Providers:  Patient Care Team:  Praveen Leung MD as PCP - General (Family Medicine)  Andrew Palm MD as Consulting Physician (Gastroenterology)  Vivien Solis MD as Consulting Physician (Obstetrics and Gynecology)    Outpatient Medications Prior to Visit   Medication Sig Dispense Refill   • aspirin 81 MG chewable tablet Chew 1 tablet Daily. 30 tablet 5   • Calcium-Vitamin D 500-125 MG-UNIT tablet Take 1 tablet by mouth Daily.     • megestrol (MEGACE) 40 MG/ML suspension Take 10 mL by mouth Daily. 480 mL 1   • Multiple Vitamins-Minerals (MULTIVITAMIN ADULT PO) Take 1 tablet by mouth Daily.     • memantine (NAMENDA) 5 MG tablet Take 1 tablet by mouth Every Night. 30 tablet 2   • rosuvastatin (CRESTOR) 10 MG tablet Take 1 tablet by mouth Daily. 90 tablet 1     No facility-administered medications prior to visit.       No opioid medication identified on active medication list. I have reviewed chart for other potential  high risk medication/s and harmful drug interactions in the elderly.          Aspirin is on active medication list. Aspirin use is not indicated based on review of current medical condition/s. Risk of harm outweighs potential benefits. Patient instructed to discontinue this medication.  .      Patient Active  "Problem List   Diagnosis   • Mixed hyperlipidemia   • Hormone replacement therapy   • Cyst of lip   • Cold   • Palpitations   • Sprain of left rotator cuff capsule   • Family hx colonic polyps   • Herpes zoster without complication   • Skin lesion   • Acute sinusitis   • Weight loss   • Acute right-sided weakness   • TIA (transient ischemic attack)   • Essential tremor   • S/P deep brain stimulator placement     Advance Care Planning  Advance Directive is not on file.  ACP discussion was held with the patient during this visit. Patient does not have an advance directive, information provided.     Objective    Vitals:    12/13/22 0722   BP: 122/68   Pulse: 72   SpO2: 98%   Weight: 41.7 kg (92 lb)   Height: 160 cm (63\")     Estimated body mass index is 16.3 kg/m² as calculated from the following:    Height as of this encounter: 160 cm (63\").    Weight as of this encounter: 41.7 kg (92 lb).    BMI is below normal parameters (malnutrition). Recommendations: none (medical contraindication)      Does the patient have evidence of cognitive impairment?   No            HEALTH RISK ASSESSMENT    Smoking Status:  Social History     Tobacco Use   Smoking Status Never   Smokeless Tobacco Never     Alcohol Consumption:  Social History     Substance and Sexual Activity   Alcohol Use No     Fall Risk Screen:    STEADI Fall Risk Assessment was completed, and patient is at MODERATE risk for falls. Assessment completed on:12/13/2022    Depression Screening:  PHQ-2/PHQ-9 Depression Screening 12/13/2022   Retired PHQ-9 Total Score -   Retired Total Score -   Little Interest or Pleasure in Doing Things 0-->not at all   Feeling Down, Depressed or Hopeless 1-->several days   PHQ-9: Brief Depression Severity Measure Score 1       Health Habits and Functional and Cognitive Screening:  Functional & Cognitive Status 12/13/2022   Do you have difficulty preparing food and eating? Yes   Do you have difficulty bathing yourself, getting dressed or " grooming yourself? No   Do you have difficulty using the toilet? No   Do you have difficulty moving around from place to place? No   Do you have trouble with steps or getting out of a bed or a chair? Yes   Current Diet -   Dental Exam Not up to date   Eye Exam Not up to date   Exercise (times per week) 2 times per week   Current Exercises Include Walking   Current Exercise Activities Include -   Do you need help using the phone?  No   Are you deaf or do you have serious difficulty hearing?  No   Do you need help with transportation? Yes   Do you need help shopping? Yes   Do you need help preparing meals?  Yes   Do you need help with housework?  Yes   Do you need help with laundry? Yes   Do you need help taking your medications? No   Do you need help managing money? No   Do you ever drive or ride in a car without wearing a seat belt? No   Have you felt unusual stress, anger or loneliness in the last month? No   Who do you live with? Spouse   If you need help, do you have trouble finding someone available to you? No   Have you been bothered in the last four weeks by sexual problems? No   Do you have difficulty concentrating, remembering or making decisions? No       Age-appropriate Screening Schedule:  Refer to the list below for future screening recommendations based on patient's age, sex and/or medical conditions. Orders for these recommended tests are listed in the plan section. The patient has been provided with a written plan.    Health Maintenance   Topic Date Due   • TDAP/TD VACCINES (1 - Tdap) Never done   • ZOSTER VACCINE (1 of 2) Never done   • DXA SCAN  12/12/2020   • LIPID PANEL  10/25/2022   • INFLUENZA VACCINE  03/31/2023 (Originally 8/1/2022)   • MAMMOGRAM  04/11/2024                CMS Preventative Services Quick Reference  Risk Factors Identified During Encounter:    None Identified    The above risks/problems have been discussed with the patient.  Pertinent information has been shared with the  patient in the After Visit Summary.    Diagnoses and all orders for this visit:    1. Mixed hyperlipidemia (Primary)  -     CBC & Differential  -     Comprehensive metabolic panel  -     Lipid Panel With / Chol / HDL Ratio        Follow Up:   Next Medicare Wellness visit to be scheduled in 1 year.      An After Visit Summary and PPPS were made available to the patient.

## 2022-12-14 LAB
ALBUMIN SERPL-MCNC: 4.2 G/DL (ref 3.5–5.2)
ALBUMIN/GLOB SERPL: 2.1 G/DL
ALP SERPL-CCNC: 72 U/L (ref 39–117)
ALT SERPL-CCNC: 11 U/L (ref 1–33)
AST SERPL-CCNC: 15 U/L (ref 1–32)
BASOPHILS # BLD AUTO: 0.02 10*3/MM3 (ref 0–0.2)
BASOPHILS NFR BLD AUTO: 0.3 % (ref 0–1.5)
BILIRUB SERPL-MCNC: 0.2 MG/DL (ref 0–1.2)
BUN SERPL-MCNC: 15 MG/DL (ref 8–23)
BUN/CREAT SERPL: 25.4 (ref 7–25)
CALCIUM SERPL-MCNC: 9.5 MG/DL (ref 8.6–10.5)
CHLORIDE SERPL-SCNC: 103 MMOL/L (ref 98–107)
CHOLEST SERPL-MCNC: 247 MG/DL (ref 0–200)
CHOLEST/HDLC SERPL: 4.33 {RATIO}
CO2 SERPL-SCNC: 33.2 MMOL/L (ref 22–29)
CREAT SERPL-MCNC: 0.59 MG/DL (ref 0.57–1)
EGFRCR SERPLBLD CKD-EPI 2021: 93 ML/MIN/1.73
EOSINOPHIL # BLD AUTO: 0.06 10*3/MM3 (ref 0–0.4)
EOSINOPHIL NFR BLD AUTO: 0.9 % (ref 0.3–6.2)
ERYTHROCYTE [DISTWIDTH] IN BLOOD BY AUTOMATED COUNT: 11.8 % (ref 12.3–15.4)
GLOBULIN SER CALC-MCNC: 2 GM/DL
GLUCOSE SERPL-MCNC: 99 MG/DL (ref 65–99)
HCT VFR BLD AUTO: 41.1 % (ref 34–46.6)
HDLC SERPL-MCNC: 57 MG/DL (ref 40–60)
HGB BLD-MCNC: 13.6 G/DL (ref 12–15.9)
IMM GRANULOCYTES # BLD AUTO: 0.02 10*3/MM3 (ref 0–0.05)
IMM GRANULOCYTES NFR BLD AUTO: 0.3 % (ref 0–0.5)
LDLC SERPL CALC-MCNC: 159 MG/DL (ref 0–100)
LYMPHOCYTES # BLD AUTO: 1.11 10*3/MM3 (ref 0.7–3.1)
LYMPHOCYTES NFR BLD AUTO: 16.3 % (ref 19.6–45.3)
MCH RBC QN AUTO: 31.5 PG (ref 26.6–33)
MCHC RBC AUTO-ENTMCNC: 33.1 G/DL (ref 31.5–35.7)
MCV RBC AUTO: 95.1 FL (ref 79–97)
MONOCYTES # BLD AUTO: 0.41 10*3/MM3 (ref 0.1–0.9)
MONOCYTES NFR BLD AUTO: 6 % (ref 5–12)
NEUTROPHILS # BLD AUTO: 5.2 10*3/MM3 (ref 1.7–7)
NEUTROPHILS NFR BLD AUTO: 76.2 % (ref 42.7–76)
NRBC BLD AUTO-RTO: 0 /100 WBC (ref 0–0.2)
PLATELET # BLD AUTO: 206 10*3/MM3 (ref 140–450)
POTASSIUM SERPL-SCNC: 4.1 MMOL/L (ref 3.5–5.2)
PROT SERPL-MCNC: 6.2 G/DL (ref 6–8.5)
RBC # BLD AUTO: 4.32 10*6/MM3 (ref 3.77–5.28)
SODIUM SERPL-SCNC: 146 MMOL/L (ref 136–145)
TRIGL SERPL-MCNC: 171 MG/DL (ref 0–150)
VLDLC SERPL CALC-MCNC: 31 MG/DL (ref 5–40)
WBC # BLD AUTO: 6.82 10*3/MM3 (ref 3.4–10.8)

## 2022-12-14 RX ORDER — ROSUVASTATIN CALCIUM 10 MG/1
10 TABLET, COATED ORAL DAILY
Qty: 90 TABLET | Refills: 1 | Status: SHIPPED | OUTPATIENT
Start: 2022-12-14

## 2023-01-26 ENCOUNTER — TELEPHONE (OUTPATIENT)
Dept: NEUROLOGY | Facility: CLINIC | Age: 78
End: 2023-01-26

## 2023-01-26 NOTE — TELEPHONE ENCOUNTER
Caller: Mic Aguilar    Relationship: Emergency Contact    Best call back number: [phone]    What was the call regarding: PT'S  CALLED TO CANCEL PT'S F/U APPT W/ ISACC ZAVALETA ON 2/13/23. PT'S  STATES THAT HE HAS DISCUSSED PT'S MEMORY W/ HER PCP AND HE PLANS TO JUST F/U W/ PCP W/ ANY FURTHER MEMORY CONCERNS AT THIS TIME.    SENDING ENCOUNTER AS MEMORY-RELATED F/U APPT HAS BEEN CANCELLED W/ OPT TO NOT RESCHEDULE AT THE TIME OF CALL.

## 2023-06-13 ENCOUNTER — OFFICE VISIT (OUTPATIENT)
Dept: FAMILY MEDICINE CLINIC | Facility: CLINIC | Age: 78
End: 2023-06-13
Payer: MEDICARE

## 2023-06-13 VITALS
HEIGHT: 63 IN | HEART RATE: 76 BPM | DIASTOLIC BLOOD PRESSURE: 68 MMHG | SYSTOLIC BLOOD PRESSURE: 122 MMHG | TEMPERATURE: 97.3 F | OXYGEN SATURATION: 98 % | BODY MASS INDEX: 16.12 KG/M2 | WEIGHT: 91 LBS

## 2023-06-13 DIAGNOSIS — F03.90 DEMENTIA WITHOUT BEHAVIORAL DISTURBANCE, PSYCHOTIC DISTURBANCE, MOOD DISTURBANCE, OR ANXIETY, UNSPECIFIED DEMENTIA SEVERITY, UNSPECIFIED DEMENTIA TYPE: ICD-10-CM

## 2023-06-13 DIAGNOSIS — E78.2 MIXED HYPERLIPIDEMIA: Primary | ICD-10-CM

## 2023-06-13 PROCEDURE — 99213 OFFICE O/P EST LOW 20 MIN: CPT | Performed by: FAMILY MEDICINE

## 2023-06-13 RX ORDER — ROSUVASTATIN CALCIUM 10 MG/1
10 TABLET, COATED ORAL DAILY
Qty: 90 TABLET | Refills: 1 | Status: SHIPPED | OUTPATIENT
Start: 2023-06-13 | End: 2023-06-15 | Stop reason: SDUPTHER

## 2023-06-13 NOTE — PROGRESS NOTES
Subjective   Lavinia Aguilar is a 78 y.o. female.     Chief Complaint   Patient presents with    Hyperlipidemia        History of Present Illness     Here todayh with her shuband--toleratitgn statin bebe marroquin       Current Outpatient Medications:     aspirin 81 MG chewable tablet, Chew 1 tablet Daily., Disp: 30 tablet, Rfl: 5    Calcium-Vitamin D 500-125 MG-UNIT tablet, Take 1 tablet by mouth Daily., Disp: , Rfl:     Multiple Vitamins-Minerals (MULTIVITAMIN ADULT PO), Take 1 tablet by mouth Daily., Disp: , Rfl:     rosuvastatin (CRESTOR) 10 MG tablet, Take 1 tablet by mouth Daily., Disp: 90 tablet, Rfl: 1  No Known Allergies    BMI is below normal parameters (malnutrition). Recommendations: none (medical contraindication)      Past Medical History:   Diagnosis Date    Bone/cartilage disorder     Hyperlipidemia     Left rotator cuff tear     PONV (postoperative nausea and vomiting)     Stroke     TIA (transient ischemic attack)      Past Surgical History:   Procedure Laterality Date    CARPAL TUNNEL RELEASE      CERVICAL SPINE SURGERY      COLONOSCOPY      COLONOSCOPY  06/19/2013    internal hemorrhoid    COLONOSCOPY N/A 7/11/2018    Procedure: COLONOSCOPY WITH ANESTHESIA;  Surgeon: Andrew Palm MD;  Location: [facility] ENDOSCOPY;  Service: Gastroenterology    HAMMER TOE REPAIR      HYSTERECTOMY      INSERTION / REMOVAL CRANIAL DBS GENERATOR      KNEE CARTILAGE SURGERY      NASAL SEPTUM SURGERY      RECTAL SURGERY      rectocele    SHOULDER ROTATOR CUFF REPAIR Left 02/2018    SHOULDER SURGERY Right     TONSILLECTOMY      TUBAL ABDOMINAL LIGATION         Review of Systems   Constitutional: Negative.    HENT: Negative.     Eyes: Negative.    Respiratory: Negative.     Cardiovascular: Negative.    Gastrointestinal: Negative.    Endocrine: Negative.    Genitourinary: Negative.    Musculoskeletal: Negative.    Skin: Negative.    Allergic/Immunologic: Negative.    Neurological: Negative.    Hematological: Negative.  "   Psychiatric/Behavioral: Negative.       Objective /68   Pulse 76   Temp 97.3 °F (36.3 °C)   Ht 160 cm (63\")   Wt 41.3 kg (91 lb)   SpO2 98%   BMI 16.12 kg/m²    Physical Exam  Vitals and nursing note reviewed.   Constitutional:       Appearance: Normal appearance. She is normal weight.   HENT:      Head: Normocephalic and atraumatic.      Nose: Nose normal.      Mouth/Throat:      Mouth: Mucous membranes are moist.   Eyes:      Pupils: Pupils are equal, round, and reactive to light.   Cardiovascular:      Rate and Rhythm: Normal rate and regular rhythm.      Pulses: Normal pulses.      Heart sounds: Normal heart sounds.   Pulmonary:      Effort: Pulmonary effort is normal.      Breath sounds: Normal breath sounds.   Abdominal:      General: Abdomen is flat. Bowel sounds are normal.      Palpations: Abdomen is soft.   Musculoskeletal:         General: Normal range of motion.      Cervical back: Normal range of motion and neck supple.   Skin:     General: Skin is warm and dry.      Capillary Refill: Capillary refill takes less than 2 seconds.   Neurological:      Mental Status: She is alert. Mental status is at baseline. She is disoriented.   Psychiatric:         Mood and Affect: Mood normal.       Assessment & Plan   Diagnoses and all orders for this visit:    1. Mixed hyperlipidemia (Primary)  -     CBC & Differential  -     Comprehensive metabolic panel  -     Lipid Panel With / Chol / HDL Ratio    2. Dementia without behavioral disturbance, psychotic disturbance, mood disturbance, or anxiety, unspecified dementia severity, unspecified dementia type  -     CBC & Differential  -     Comprehensive metabolic panel  -     Lipid Panel With / Chol / HDL Ratio      Monitor for myalgis or need for help in home           Orders Placed This Encounter   Procedures    Comprehensive metabolic panel     Order Specific Question:   Release to patient     Answer:   Routine Release    Lipid Panel With / Chol / HDL " Ratio     Order Specific Question:   Release to patient     Answer:   Routine Release    CBC & Differential       Follow up: 6 month(s)

## 2023-06-14 LAB
ALBUMIN SERPL-MCNC: 4.1 G/DL (ref 3.5–5.2)
ALBUMIN/GLOB SERPL: 1.6 G/DL
ALP SERPL-CCNC: 61 U/L (ref 39–117)
ALT SERPL-CCNC: 21 U/L (ref 1–33)
AST SERPL-CCNC: 20 U/L (ref 1–32)
BASOPHILS # BLD AUTO: 0.02 10*3/MM3 (ref 0–0.2)
BASOPHILS NFR BLD AUTO: 0.3 % (ref 0–1.5)
BILIRUB SERPL-MCNC: 0.3 MG/DL (ref 0–1.2)
BUN SERPL-MCNC: 12 MG/DL (ref 8–23)
BUN/CREAT SERPL: 15.2 (ref 7–25)
CALCIUM SERPL-MCNC: 9.9 MG/DL (ref 8.6–10.5)
CHLORIDE SERPL-SCNC: 101 MMOL/L (ref 98–107)
CHOLEST SERPL-MCNC: 179 MG/DL (ref 0–200)
CHOLEST/HDLC SERPL: 2.67 {RATIO}
CO2 SERPL-SCNC: 34.4 MMOL/L (ref 22–29)
CREAT SERPL-MCNC: 0.79 MG/DL (ref 0.57–1)
EGFRCR SERPLBLD CKD-EPI 2021: 76.7 ML/MIN/1.73
EOSINOPHIL # BLD AUTO: 0.08 10*3/MM3 (ref 0–0.4)
EOSINOPHIL NFR BLD AUTO: 1.3 % (ref 0.3–6.2)
ERYTHROCYTE [DISTWIDTH] IN BLOOD BY AUTOMATED COUNT: 12 % (ref 12.3–15.4)
GLOBULIN SER CALC-MCNC: 2.5 GM/DL
GLUCOSE SERPL-MCNC: 114 MG/DL (ref 65–99)
HCT VFR BLD AUTO: 41.5 % (ref 34–46.6)
HDLC SERPL-MCNC: 67 MG/DL (ref 40–60)
HGB BLD-MCNC: 13.9 G/DL (ref 12–15.9)
IMM GRANULOCYTES # BLD AUTO: 0.01 10*3/MM3 (ref 0–0.05)
IMM GRANULOCYTES NFR BLD AUTO: 0.2 % (ref 0–0.5)
LDLC SERPL CALC-MCNC: 97 MG/DL (ref 0–100)
LYMPHOCYTES # BLD AUTO: 1.62 10*3/MM3 (ref 0.7–3.1)
LYMPHOCYTES NFR BLD AUTO: 27.1 % (ref 19.6–45.3)
MCH RBC QN AUTO: 32.5 PG (ref 26.6–33)
MCHC RBC AUTO-ENTMCNC: 33.5 G/DL (ref 31.5–35.7)
MCV RBC AUTO: 97 FL (ref 79–97)
MONOCYTES # BLD AUTO: 0.44 10*3/MM3 (ref 0.1–0.9)
MONOCYTES NFR BLD AUTO: 7.4 % (ref 5–12)
NEUTROPHILS # BLD AUTO: 3.8 10*3/MM3 (ref 1.7–7)
NEUTROPHILS NFR BLD AUTO: 63.7 % (ref 42.7–76)
NRBC BLD AUTO-RTO: 0 /100 WBC (ref 0–0.2)
PLATELET # BLD AUTO: 208 10*3/MM3 (ref 140–450)
POTASSIUM SERPL-SCNC: 4.3 MMOL/L (ref 3.5–5.2)
PROT SERPL-MCNC: 6.6 G/DL (ref 6–8.5)
RBC # BLD AUTO: 4.28 10*6/MM3 (ref 3.77–5.28)
SODIUM SERPL-SCNC: 143 MMOL/L (ref 136–145)
TRIGL SERPL-MCNC: 80 MG/DL (ref 0–150)
VLDLC SERPL CALC-MCNC: 15 MG/DL (ref 5–40)
WBC # BLD AUTO: 5.97 10*3/MM3 (ref 3.4–10.8)

## 2023-06-15 ENCOUNTER — TELEPHONE (OUTPATIENT)
Dept: FAMILY MEDICINE CLINIC | Facility: CLINIC | Age: 78
End: 2023-06-15
Payer: MEDICARE

## 2023-06-15 RX ORDER — ROSUVASTATIN CALCIUM 10 MG/1
10 TABLET, COATED ORAL DAILY
Qty: 90 TABLET | Refills: 1 | Status: SHIPPED | OUTPATIENT
Start: 2023-06-15

## 2023-06-15 NOTE — TELEPHONE ENCOUNTER
Caller: Mic Aguilar    Relationship to patient: Emergency Contact    Best call back number: [phone]    Patient is needing: CRESTOR SENT TO [facility], STATES THAT [facility] WON'T ACCEPT THEIR MEDICARE CARD

## 2023-07-17 ENCOUNTER — TELEPHONE (OUTPATIENT)
Dept: FAMILY MEDICINE CLINIC | Facility: CLINIC | Age: 78
End: 2023-07-17

## 2023-07-17 NOTE — TELEPHONE ENCOUNTER
Caller: SHILA    Relationship: [facility]    Best call back number: [phone]        Who are you requesting to speak with     CLINICAL STAFF    Do you know the name of the person who called: SHILA    What was the call regarding:     REFERRAL WAS SENT FOR HOSPICE CARE AND THEY DO NOT HAVE HOSPICE. PLEASE SEND TO ANOTHER AGENCY    Is it okay if the provider responds through MyChart: NO

## 2023-09-11 ENCOUNTER — TELEPHONE (OUTPATIENT)
Dept: FAMILY MEDICINE CLINIC | Facility: CLINIC | Age: 78
End: 2023-09-11

## 2023-09-11 DIAGNOSIS — F03.90 DEMENTIA WITHOUT BEHAVIORAL DISTURBANCE, PSYCHOTIC DISTURBANCE, MOOD DISTURBANCE, OR ANXIETY, UNSPECIFIED DEMENTIA SEVERITY, UNSPECIFIED DEMENTIA TYPE: Primary | ICD-10-CM

## 2023-09-11 RX ORDER — ASPIRIN 81 MG/1
81 TABLET, CHEWABLE ORAL DAILY
Qty: 30 TABLET | Refills: 5 | Status: SHIPPED | OUTPATIENT
Start: 2023-09-11 | End: 2023-09-15 | Stop reason: SDUPTHER

## 2023-09-11 NOTE — TELEPHONE ENCOUNTER
Caller: Mic Aguilar    Relationship: Emergency Contact    Best call back number: [phone]     Requested Prescriptions:   Requested Prescriptions     Pending Prescriptions Disp Refills    aspirin 81 MG chewable tablet 30 tablet 5     Sig: Chew 1 tablet Daily.        Pharmacy where request should be sent: [facility] #[id number] [address] [facility] - [phone]  - [phone]     Last office visit with prescribing clinician: 6/13/2023   Last telemedicine visit with prescribing clinician: Visit date not found   Next office visit with prescribing clinician: Visit date not found     Does the patient have less than a 3 day supply:  [x] Yes  [] No    Would you like a call back once the refill request has been completed: [x] Yes [] No    If the office needs to give you a call back, can they leave a voicemail: [x] Yes [] No    Porfirio Villanueva Rep   09/11/23 14:06 CDT

## 2023-09-11 NOTE — TELEPHONE ENCOUNTER
The family is now requesting hospice for this pt, [facility] called and said they will need a new referral with the terminal dx, please send referral, last ov, and med list to [phone]

## 2023-09-15 RX ORDER — ASPIRIN 81 MG/1
81 TABLET, CHEWABLE ORAL DAILY
Qty: 30 TABLET | Refills: 5 | Status: SHIPPED | OUTPATIENT
Start: 2023-09-15

## 2023-09-15 RX ORDER — ASPIRIN 81 MG/1
81 TABLET, CHEWABLE ORAL DAILY
Qty: 30 TABLET | Refills: 5 | Status: SHIPPED | OUTPATIENT
Start: 2023-09-15 | End: 2023-09-15 | Stop reason: SDUPTHER

## 2023-09-19 ENCOUNTER — TELEPHONE (OUTPATIENT)
Dept: FAMILY MEDICINE CLINIC | Facility: CLINIC | Age: 78
End: 2023-09-19

## 2023-09-19 NOTE — TELEPHONE ENCOUNTER
Caller: HARI VAZQUEZ    Relationship:  INTAKE AT [facility]    Best call back number: [phone]    What is the medical concern/diagnosis:     DEMENTIA    What specialty or service is being requested:      HOSPICE CARE    What is the provider, practice or medical service name:     [facility]    What is the office location:     [place]    What is the office phone number:     (P) [phone]    (F) [phone]     Any additional details:     [facility] CALLED REQUESTING A REFERRAL TO THIS HOSPICE    PLEASE CALL AND [facility] H[facility]

## (undated) DEVICE — BIPOLAR SEALER 23-112-1 AQM 6.0: Brand: AQUAMANTYS ®

## (undated) DEVICE — Z INACTIVE USE 2660664 SOLUTION IRRIG 3000ML 0.9% SOD CHL USP UROMATIC PLAS CONT

## (undated) DEVICE — SPONGE LAP W18XL18IN WHT COT 4 PLY FLD STRUNG RADPQ DISP ST

## (undated) DEVICE — SOLUTION IV 250ML 0.9% SOD CHL PH 5 INJ USP VIAFLX PLAS

## (undated) DEVICE — FAN SPRAY KIT: Brand: PULSAVAC®

## (undated) DEVICE — CHLORAPREP 26ML ORANGE

## (undated) DEVICE — SHOULDER CDS

## (undated) DEVICE — SHOULDER STABILIZATION KIT,                                    DISPOSABLE 12 PER BOX

## (undated) DEVICE — SUTURE ETHBND EXCEL SZ 5 L30IN NONABSORBABLE GRN L40MM V-37 MB66G

## (undated) DEVICE — 3M™ BAIR HUGGER® LOWER BODY BLANKET, 10 PER CASE 52500: Brand: BAIR HUGGER™

## (undated) DEVICE — SENSR O2 OXIMAX FNGR A/ 18IN NONSTR

## (undated) DEVICE — BLADE SURG NO10 C STL DISP ST

## (undated) DEVICE — Device

## (undated) DEVICE — SUTURE VCRL SZ 0 L27IN ABSRB UD L36MM CT-1 1/2 CIR J260H

## (undated) DEVICE — COVER,TABLE,44X90,STERILE: Brand: MEDLINE

## (undated) DEVICE — GAUZE,SPONGE,AVANT,4"X4",4PLY,STRL,10/TR: Brand: MEDLINE

## (undated) DEVICE — TBG SMPL FLTR LINE NASL 02/C02 A/ BX/100

## (undated) DEVICE — 3M™ IOBAN™ 2 ANTIMICROBIAL INCISE DRAPE 6651EZ: Brand: IOBAN™ 2

## (undated) DEVICE — DRESSING FOAM W4XL10IN SIL RECT ADH WTRPRF FLM BK W/ BORD

## (undated) DEVICE — Device: Brand: DEFENDO AIR/WATER/SUCTION AND BIOPSY VALVE

## (undated) DEVICE — STERILE LATEX POWDER FREE SURGICAL GLOVES WITH HYDROGEL COATING: Brand: PROTEXIS

## (undated) DEVICE — ARM BOARD PAD: Brand: DEVON

## (undated) DEVICE — CUFF,BP,DISP,1 TUBE,ADULT,HP: Brand: MEDLINE

## (undated) DEVICE — ADHESIVE SKIN CLSR 0.7ML TOP DERMBND ADV

## (undated) DEVICE — MCLASS OSCILLATING SAW BLADE 19 X 1.27 (0.050") X 90 MM: Brand: MCLASS

## (undated) DEVICE — TWIST DRILL 4.7MM DIA 127.0MM LONG

## (undated) DEVICE — STRETCH BANDAGE ROLL: Brand: DERMACEA

## (undated) DEVICE — THREE QUARTER SHEET: Brand: CONVERTORS

## (undated) DEVICE — STERILE POLYISOPRENE POWDER-FREE SURGICAL GLOVES: Brand: PROTEXIS

## (undated) DEVICE — T-MAX DISPOSABLE FACE MASK 8 PER BOX

## (undated) DEVICE — THE CHANNEL CLEANING BRUSH IS A NYLON FLEXI BRUSH ATTACHED TO A FLEXIBLE PLASTIC SHEATH DESIGNED TO SAFELY REMOVE DEBRIS FROM FLEXIBLE ENDOSCOPES.

## (undated) DEVICE — DRAPE,SHOULDER,BEACH CHAIR,STERILE: Brand: MEDLINE

## (undated) DEVICE — CONMED GOLDLINE ELECTROSURGICAL HANDPIECE, HAND CONTROLLED WITH BLADE ELECTRODE, BUTTON SWITCH, SAFETY HOLSTER AND 10 FT (3 M) CABLE: Brand: CONMED GOLDLINE

## (undated) DEVICE — SUTURE VCRL SZ 2-0 L36IN ABSRB UD L36MM CT-1 1/2 CIR J945H

## (undated) DEVICE — MASK,OXYGEN,MED CONC,ADLT,7' TUB, UC: Brand: PENDING

## (undated) DEVICE — ENDOGATOR AUXILIARY WATER JET CONNECTOR: Brand: ENDOGATOR

## (undated) DEVICE — SOLUTION IV IRRIG POUR BRL 0.9% SODIUM CHL 2F7124